# Patient Record
Sex: MALE | Race: BLACK OR AFRICAN AMERICAN | NOT HISPANIC OR LATINO | Employment: UNEMPLOYED | URBAN - METROPOLITAN AREA
[De-identification: names, ages, dates, MRNs, and addresses within clinical notes are randomized per-mention and may not be internally consistent; named-entity substitution may affect disease eponyms.]

---

## 2017-11-13 ENCOUNTER — HOSPITAL ENCOUNTER (INPATIENT)
Facility: HOSPITAL | Age: 32
LOS: 36 days | Discharge: NURSING, REHAB OR LONG-TERM CARE | DRG: 478 | End: 2017-12-19
Attending: EMERGENCY MEDICINE | Admitting: EMERGENCY MEDICINE
Payer: MEDICAID

## 2017-11-13 DIAGNOSIS — M46.46 DISCITIS OF LUMBAR REGION: Primary | ICD-10-CM

## 2017-11-13 DIAGNOSIS — M54.9 BACK PAIN: ICD-10-CM

## 2017-11-13 DIAGNOSIS — M54.41 ACUTE BILATERAL LOW BACK PAIN WITH BILATERAL SCIATICA: ICD-10-CM

## 2017-11-13 DIAGNOSIS — M54.42 ACUTE BILATERAL LOW BACK PAIN WITH BILATERAL SCIATICA: ICD-10-CM

## 2017-11-13 DIAGNOSIS — M86.9 OSTEOMYELITIS: ICD-10-CM

## 2017-11-13 LAB
AMPHET+METHAMPHET UR QL: NEGATIVE
ANION GAP SERPL CALC-SCNC: 9 MMOL/L
BACTERIA #/AREA URNS AUTO: ABNORMAL /HPF
BARBITURATES UR QL SCN>200 NG/ML: NEGATIVE
BASOPHILS # BLD AUTO: 0.02 K/UL
BASOPHILS NFR BLD: 0.4 %
BENZODIAZ UR QL SCN>200 NG/ML: NORMAL
BILIRUB UR QL STRIP: NEGATIVE
BUN SERPL-MCNC: 8 MG/DL
BZE UR QL SCN: NORMAL
CALCIUM SERPL-MCNC: 10.2 MG/DL
CANNABINOIDS UR QL SCN: NORMAL
CHLORIDE SERPL-SCNC: 101 MMOL/L
CLARITY UR REFRACT.AUTO: CLEAR
CO2 SERPL-SCNC: 30 MMOL/L
COLOR UR AUTO: YELLOW
CREAT SERPL-MCNC: 1 MG/DL
CREAT UR-MCNC: 284 MG/DL
CRP SERPL-MCNC: 9.7 MG/L
DIFFERENTIAL METHOD: ABNORMAL
EOSINOPHIL # BLD AUTO: 0.1 K/UL
EOSINOPHIL NFR BLD: 0.9 %
ERYTHROCYTE [DISTWIDTH] IN BLOOD BY AUTOMATED COUNT: 14.1 %
ERYTHROCYTE [SEDIMENTATION RATE] IN BLOOD BY WESTERGREN METHOD: 55 MM/HR
EST. GFR  (AFRICAN AMERICAN): >60 ML/MIN/1.73 M^2
EST. GFR  (NON AFRICAN AMERICAN): >60 ML/MIN/1.73 M^2
ESTIMATED AVG GLUCOSE: 117 MG/DL
ETHANOL UR-MCNC: <10 MG/DL
GLUCOSE SERPL-MCNC: 81 MG/DL
GLUCOSE UR QL STRIP: NEGATIVE
HBA1C MFR BLD HPLC: 5.7 %
HCT VFR BLD AUTO: 39.5 %
HGB BLD-MCNC: 12.7 G/DL
HGB UR QL STRIP: NEGATIVE
IMM GRANULOCYTES # BLD AUTO: 0.01 K/UL
IMM GRANULOCYTES NFR BLD AUTO: 0.2 %
KETONES UR QL STRIP: NEGATIVE
LEUKOCYTE ESTERASE UR QL STRIP: ABNORMAL
LYMPHOCYTES # BLD AUTO: 2.1 K/UL
LYMPHOCYTES NFR BLD: 39 %
MCH RBC QN AUTO: 27.2 PG
MCHC RBC AUTO-ENTMCNC: 32.2 G/DL
MCV RBC AUTO: 85 FL
METHADONE UR QL SCN>300 NG/ML: NEGATIVE
MICROSCOPIC COMMENT: ABNORMAL
MONOCYTES # BLD AUTO: 0.7 K/UL
MONOCYTES NFR BLD: 13.2 %
NEUTROPHILS # BLD AUTO: 2.5 K/UL
NEUTROPHILS NFR BLD: 46.3 %
NITRITE UR QL STRIP: NEGATIVE
NRBC BLD-RTO: 0 /100 WBC
OPIATES UR QL SCN: NORMAL
PCP UR QL SCN>25 NG/ML: NEGATIVE
PH UR STRIP: 5 [PH] (ref 5–8)
PLATELET # BLD AUTO: 368 K/UL
PMV BLD AUTO: 9.5 FL
POTASSIUM SERPL-SCNC: 4.2 MMOL/L
PROCALCITONIN SERPL IA-MCNC: 0.1 NG/ML
PROT UR QL STRIP: NEGATIVE
RBC # BLD AUTO: 4.67 M/UL
RBC #/AREA URNS AUTO: 1 /HPF (ref 0–4)
SODIUM SERPL-SCNC: 140 MMOL/L
SP GR UR STRIP: 1.02 (ref 1–1.03)
SQUAMOUS #/AREA URNS AUTO: 0 /HPF
TOXICOLOGY INFORMATION: NORMAL
URN SPEC COLLECT METH UR: ABNORMAL
UROBILINOGEN UR STRIP-ACNC: NEGATIVE EU/DL
WBC # BLD AUTO: 5.39 K/UL
WBC #/AREA URNS AUTO: 8 /HPF (ref 0–5)

## 2017-11-13 PROCEDURE — 85651 RBC SED RATE NONAUTOMATED: CPT

## 2017-11-13 PROCEDURE — 87040 BLOOD CULTURE FOR BACTERIA: CPT | Mod: 59

## 2017-11-13 PROCEDURE — 85025 COMPLETE CBC W/AUTO DIFF WBC: CPT

## 2017-11-13 PROCEDURE — 86140 C-REACTIVE PROTEIN: CPT

## 2017-11-13 PROCEDURE — 81001 URINALYSIS AUTO W/SCOPE: CPT

## 2017-11-13 PROCEDURE — 25000003 PHARM REV CODE 250: Performed by: STUDENT IN AN ORGANIZED HEALTH CARE EDUCATION/TRAINING PROGRAM

## 2017-11-13 PROCEDURE — 93005 ELECTROCARDIOGRAM TRACING: CPT

## 2017-11-13 PROCEDURE — 99285 EMERGENCY DEPT VISIT HI MDM: CPT | Mod: 25

## 2017-11-13 PROCEDURE — 99285 EMERGENCY DEPT VISIT HI MDM: CPT | Mod: ,,, | Performed by: NURSE PRACTITIONER

## 2017-11-13 PROCEDURE — 80048 BASIC METABOLIC PNL TOTAL CA: CPT

## 2017-11-13 PROCEDURE — 99284 EMERGENCY DEPT VISIT MOD MDM: CPT | Mod: ,,, | Performed by: PHYSICIAN ASSISTANT

## 2017-11-13 PROCEDURE — 20600001 HC STEP DOWN PRIVATE ROOM

## 2017-11-13 PROCEDURE — 80307 DRUG TEST PRSMV CHEM ANLYZR: CPT

## 2017-11-13 PROCEDURE — 83036 HEMOGLOBIN GLYCOSYLATED A1C: CPT

## 2017-11-13 PROCEDURE — 25000003 PHARM REV CODE 250: Performed by: NURSE PRACTITIONER

## 2017-11-13 PROCEDURE — 93010 ELECTROCARDIOGRAM REPORT: CPT | Mod: ,,, | Performed by: INTERNAL MEDICINE

## 2017-11-13 PROCEDURE — 84145 PROCALCITONIN (PCT): CPT

## 2017-11-13 PROCEDURE — 87086 URINE CULTURE/COLONY COUNT: CPT

## 2017-11-13 RX ORDER — AMOXICILLIN 250 MG
1 CAPSULE ORAL 2 TIMES DAILY
Status: DISCONTINUED | OUTPATIENT
Start: 2017-11-13 | End: 2017-12-19 | Stop reason: HOSPADM

## 2017-11-13 RX ORDER — IBUPROFEN 200 MG
1 TABLET ORAL DAILY
Status: DISCONTINUED | OUTPATIENT
Start: 2017-11-14 | End: 2017-12-01

## 2017-11-13 RX ORDER — NAPROXEN 500 MG/1
500 TABLET ORAL
Status: COMPLETED | OUTPATIENT
Start: 2017-11-13 | End: 2017-11-13

## 2017-11-13 RX ORDER — HYDROCODONE BITARTRATE AND ACETAMINOPHEN 7.5; 325 MG/1; MG/1
1 TABLET ORAL EVERY 6 HOURS PRN
COMMUNITY
End: 2018-01-31

## 2017-11-13 RX ORDER — OXYCODONE AND ACETAMINOPHEN 5; 325 MG/1; MG/1
1 TABLET ORAL
Status: COMPLETED | OUTPATIENT
Start: 2017-11-13 | End: 2017-11-13

## 2017-11-13 RX ORDER — OXYCODONE AND ACETAMINOPHEN 10; 325 MG/1; MG/1
1 TABLET ORAL
Status: COMPLETED | OUTPATIENT
Start: 2017-11-13 | End: 2017-11-13

## 2017-11-13 RX ORDER — CYCLOBENZAPRINE HCL 5 MG
5 TABLET ORAL 3 TIMES DAILY PRN
Status: DISCONTINUED | OUTPATIENT
Start: 2017-11-13 | End: 2017-11-21

## 2017-11-13 RX ORDER — MORPHINE SULFATE 2 MG/ML
4 INJECTION, SOLUTION INTRAMUSCULAR; INTRAVENOUS EVERY 6 HOURS PRN
Status: DISCONTINUED | OUTPATIENT
Start: 2017-11-13 | End: 2017-11-19

## 2017-11-13 RX ORDER — GABAPENTIN 300 MG/1
300 CAPSULE ORAL 3 TIMES DAILY
COMMUNITY
End: 2018-02-15 | Stop reason: SDUPTHER

## 2017-11-13 RX ORDER — HYDROCODONE BITARTRATE AND ACETAMINOPHEN 7.5; 325 MG/1; MG/1
1 TABLET ORAL EVERY 6 HOURS PRN
Status: DISCONTINUED | OUTPATIENT
Start: 2017-11-13 | End: 2017-11-20

## 2017-11-13 RX ORDER — SODIUM CHLORIDE 0.9 % (FLUSH) 0.9 %
5 SYRINGE (ML) INJECTION
Status: DISCONTINUED | OUTPATIENT
Start: 2017-11-13 | End: 2017-12-19 | Stop reason: HOSPADM

## 2017-11-13 RX ORDER — GABAPENTIN 300 MG/1
300 CAPSULE ORAL 3 TIMES DAILY
Status: DISCONTINUED | OUTPATIENT
Start: 2017-11-13 | End: 2017-11-17

## 2017-11-13 RX ORDER — GABAPENTIN 300 MG/1
300 CAPSULE ORAL 3 TIMES DAILY
Status: DISCONTINUED | OUTPATIENT
Start: 2017-11-13 | End: 2017-11-13

## 2017-11-13 RX ORDER — IBUPROFEN 200 MG
16 TABLET ORAL
Status: DISCONTINUED | OUTPATIENT
Start: 2017-11-13 | End: 2017-12-19 | Stop reason: HOSPADM

## 2017-11-13 RX ORDER — CYCLOBENZAPRINE HCL 5 MG
5 TABLET ORAL 3 TIMES DAILY PRN
Status: ON HOLD | COMMUNITY
End: 2017-12-19 | Stop reason: HOSPADM

## 2017-11-13 RX ORDER — HEPARIN SODIUM 5000 [USP'U]/ML
5000 INJECTION, SOLUTION INTRAVENOUS; SUBCUTANEOUS EVERY 8 HOURS
Status: DISCONTINUED | OUTPATIENT
Start: 2017-11-13 | End: 2017-11-14

## 2017-11-13 RX ORDER — IBUPROFEN 200 MG
24 TABLET ORAL
Status: DISCONTINUED | OUTPATIENT
Start: 2017-11-13 | End: 2017-12-19 | Stop reason: HOSPADM

## 2017-11-13 RX ORDER — GLUCAGON 1 MG
1 KIT INJECTION
Status: DISCONTINUED | OUTPATIENT
Start: 2017-11-13 | End: 2017-12-19 | Stop reason: HOSPADM

## 2017-11-13 RX ADMIN — OXYCODONE AND ACETAMINOPHEN 1 TABLET: 10; 325 TABLET ORAL at 10:11

## 2017-11-13 RX ADMIN — CYCLOBENZAPRINE HYDROCHLORIDE 5 MG: 5 TABLET, FILM COATED ORAL at 07:11

## 2017-11-13 RX ADMIN — OXYCODONE AND ACETAMINOPHEN 1 TABLET: 10; 325 TABLET ORAL at 02:11

## 2017-11-13 RX ADMIN — GABAPENTIN 300 MG: 300 CAPSULE ORAL at 07:11

## 2017-11-13 RX ADMIN — NAPROXEN 500 MG: 500 TABLET ORAL at 10:11

## 2017-11-13 RX ADMIN — OXYCODONE HYDROCHLORIDE AND ACETAMINOPHEN 1 TABLET: 5; 325 TABLET ORAL at 06:11

## 2017-11-13 NOTE — HPI
Evgeny Wilde is a 32 y.o. male who presents to [facility] for low back and leg pain. Patient has approximately 1.5 month history of left low back pain with LLE anterior leg pain, for which he underwent MRI of the T and L spine 3 weeks ago at an OSH. He presents today with new onset RLE anterior pain and numbness of the toes bilaterally for the past 2-3 days. He reports 2 episodes of nocturnal incontinence 6 weeks ago, with no episodes since that time. He presents to the ED because he has run out of his prescribed pain medication. Of note, he reports dark colored urine recently. Tachycardic on presentation.    Of note, MRI is not available for review. Radiology reports from OSH reports multiple thoracic schmorl's nodes and mild left L4-5 endplate changes resulting in mild left nf stenosis. No central compression reported.    History of IVDU in 2007.
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
## 2017-11-13 NOTE — ED NOTES
Pt ambulatory into intake room with walker, offered wheelchair, states unable to sit upright and prefers to ambulate with walker.

## 2017-11-13 NOTE — SUBJECTIVE & OBJECTIVE
(Not in a hospital admission)    Review of patient's allergies indicates:  No Known Allergies    Past Medical History:   Diagnosis Date    Lumbar herniated disc      History reviewed. No pertinent surgical history.  Family History     None        Social History Main Topics    Smoking status: Current Every Day Smoker     Packs/day: 0.50    Smokeless tobacco: Never Used    Alcohol use No    Drug use: No    Sexual activity: Not on file     Review of Systems   Constitutional: no fever, chills or night sweats. No changes in weight   Eyes: no visual changes   ENT: no nasal congestion or sore throat   Respiratory: no cough or shortness of breath   Cardiovascular: no chest pain or palpitations   Gastrointestinal: no nausea or vomiting   Genitourinary: no hematuria or dysuria   Integument/Breast: no rash or pruritis   Hematologic/Lymphatic: no easy bruising or lymphadenopathy   Musculoskeletal: no arthralgias or myalgias.   Neurological: no seizures or tremors   Behavioral/Psych: no auditory or visual hallucinations   Endocrine: no heat or cold intolerance   Objective:     Weight: 90.7 kg (200 lb)  Body mass index is 25.68 kg/m².  Vital Signs (Most Recent):  Temp: 98.9 °F (37.2 °C) (11/13/17 1236)  Pulse: 102 (11/13/17 1255)  Resp: 18 (11/13/17 1236)  BP: 121/71 (11/13/17 1236)  SpO2: 100 % (11/13/17 1236) Vital Signs (24h Range):  Temp:  [98.7 °F (37.1 °C)-98.9 °F (37.2 °C)] 98.9 °F (37.2 °C)  Pulse:  [102-118] 102  Resp:  [18] 18  SpO2:  [99 %-100 %] 100 %  BP: (110-121)/(71-83) 121/71                      Neurosurgery Physical Exam  General: well developed, well nourished, no distress.   Head: normocephalic, atraumatic  Neurologic: Alert and oriented. Thought content appropriate.  GCS: Motor: 6/Verbal: 5/Eyes: 4 GCS Total: 15  Mental Status: Awake, Alert, Oriented x 4  Language: No aphasia  Speech: No dysarthria  Cranial nerves: face symmetric, tongue midline, CN II-XII grossly intact.   Eyes: pupils equal, round,  reactive to light with accomodation, EOMI.  Pulmonary: normal respirations, no signs of respiratory distress  Abdomen: soft, non-distended, not tender to palpation  Sensory: Decreased sensation in L5 distributions bilaterally.    Motor Strength: Moves all extremities spontaneously with good tone.  Full strength upper and lower extremities, except pain limited L HF. No abnormal movements seen.     Strength  Deltoids Triceps Biceps Wrist Extension Wrist Flexion Hand    Upper: R 5/5 5/5 5/5 5/5 5/5 5/5    L 5/5 5/5 5/5 5/5 5/5 5/5     Iliopsoas Quadriceps Knee  Flexion Tibialis  anterior Gastro- cnemius EHL   Lower: R 5/5 5/5 5/5 5/5 5/5 5/5    L 4-/5 5/5 5/5 5/5 5/5 5/5     DTR's: 2 + and symmetric in UE and LE  Pronator Drift: no drift noted  Finger-to-nose: Intact bilaterally  Jones: absent  Clonus: absent  Babinski: absent  Pulses: 2+ and symmetric radial and dorsalis pedis.  Skin: Skin is warm, dry and intact.  Gait: antalgic, ambulates with walker leaning forward  Tandem Gait: No difficulty         Able to walk on heels & toes  Cervical ROM: full  Lumbar ROM: full   SI Joint tenderness: Negative   Pain on Hip ROM: Negative    TTP bilateral lumbar spine.    Significant Labs:  No results for input(s): GLU, NA, K, CL, CO2, BUN, CREATININE, CALCIUM, MG in the last 48 hours.  No results for input(s): WBC, HGB, HCT, PLT in the last 48 hours.  No results for input(s): LABPT, INR, APTT in the last 48 hours.  Microbiology Results (last 7 days)     ** No results found for the last 168 hours. **          Significant Diagnostics:  CT T and L spine reviewed.  Mild spondylosis throughout the thoracic spine without focal findings.  L4-5 endplate erosion with collapse of disc space concerning for osteodiscitis. There is also compression changes of L4 and L5. Mild central stenosis and moderate bilateral nf stenosis.

## 2017-11-13 NOTE — ED TRIAGE NOTES
"Pt arrived to ED with CC of numbness to bilateral feet onset "a couple days ago" - also c/o bilateral lower back pain, rates pain 10/10 at this time - reports reports diagnosed with herniated lumbar disc and left leg sciatica x1 month ago, states "sciatica pain" began in right leg sciatica x2-3 days ago. Pt reports out of pain medication - was taking taking flexiril, neurotin, and hydrocodone with good relief. Denies loss of bowel or bladder control.      "
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
## 2017-11-14 LAB
ANION GAP SERPL CALC-SCNC: 11 MMOL/L
BUN SERPL-MCNC: 11 MG/DL
CALCIUM SERPL-MCNC: 9.7 MG/DL
CHLORIDE SERPL-SCNC: 103 MMOL/L
CO2 SERPL-SCNC: 27 MMOL/L
CREAT SERPL-MCNC: 0.9 MG/DL
EST. GFR  (AFRICAN AMERICAN): >60 ML/MIN/1.73 M^2
EST. GFR  (NON AFRICAN AMERICAN): >60 ML/MIN/1.73 M^2
GLUCOSE SERPL-MCNC: 99 MG/DL
HIV 1+2 AB+HIV1 P24 AG SERPL QL IA: NEGATIVE
INR PPP: 0.9
MAGNESIUM SERPL-MCNC: 2.1 MG/DL
PHOSPHATE SERPL-MCNC: 3.7 MG/DL
POTASSIUM SERPL-SCNC: 4.4 MMOL/L
PROTHROMBIN TIME: 10.1 SEC
SODIUM SERPL-SCNC: 141 MMOL/L
TSH SERPL DL<=0.005 MIU/L-ACNC: 1.66 UIU/ML

## 2017-11-14 PROCEDURE — 97530 THERAPEUTIC ACTIVITIES: CPT

## 2017-11-14 PROCEDURE — 80048 BASIC METABOLIC PNL TOTAL CA: CPT

## 2017-11-14 PROCEDURE — 85610 PROTHROMBIN TIME: CPT

## 2017-11-14 PROCEDURE — 36415 COLL VENOUS BLD VENIPUNCTURE: CPT

## 2017-11-14 PROCEDURE — 84443 ASSAY THYROID STIM HORMONE: CPT

## 2017-11-14 PROCEDURE — 20600001 HC STEP DOWN PRIVATE ROOM

## 2017-11-14 PROCEDURE — 84100 ASSAY OF PHOSPHORUS: CPT

## 2017-11-14 PROCEDURE — 25000003 PHARM REV CODE 250: Performed by: STUDENT IN AN ORGANIZED HEALTH CARE EDUCATION/TRAINING PROGRAM

## 2017-11-14 PROCEDURE — 83735 ASSAY OF MAGNESIUM: CPT

## 2017-11-14 PROCEDURE — 99223 1ST HOSP IP/OBS HIGH 75: CPT | Mod: ,,, | Performed by: HOSPITALIST

## 2017-11-14 PROCEDURE — 63600175 PHARM REV CODE 636 W HCPCS: Performed by: STUDENT IN AN ORGANIZED HEALTH CARE EDUCATION/TRAINING PROGRAM

## 2017-11-14 PROCEDURE — 97166 OT EVAL MOD COMPLEX 45 MIN: CPT

## 2017-11-14 PROCEDURE — 86703 HIV-1/HIV-2 1 RESULT ANTBDY: CPT

## 2017-11-14 RX ORDER — ENOXAPARIN SODIUM 100 MG/ML
40 INJECTION SUBCUTANEOUS EVERY 24 HOURS
Status: DISCONTINUED | OUTPATIENT
Start: 2017-11-14 | End: 2017-11-15

## 2017-11-14 RX ADMIN — HEPARIN SODIUM 5000 UNITS: 5000 INJECTION, SOLUTION INTRAVENOUS; SUBCUTANEOUS at 05:11

## 2017-11-14 RX ADMIN — HYDROCODONE BITARTRATE AND ACETAMINOPHEN 1 TABLET: 7.5; 325 TABLET ORAL at 05:11

## 2017-11-14 RX ADMIN — STANDARDIZED SENNA CONCENTRATE AND DOCUSATE SODIUM 1 TABLET: 8.6; 5 TABLET, FILM COATED ORAL at 08:11

## 2017-11-14 RX ADMIN — GABAPENTIN 300 MG: 300 CAPSULE ORAL at 09:11

## 2017-11-14 RX ADMIN — GABAPENTIN 300 MG: 300 CAPSULE ORAL at 05:11

## 2017-11-14 RX ADMIN — CYCLOBENZAPRINE HYDROCHLORIDE 5 MG: 5 TABLET, FILM COATED ORAL at 08:11

## 2017-11-14 RX ADMIN — CYCLOBENZAPRINE HYDROCHLORIDE 5 MG: 5 TABLET, FILM COATED ORAL at 09:11

## 2017-11-14 RX ADMIN — ENOXAPARIN SODIUM 40 MG: 100 INJECTION SUBCUTANEOUS at 05:11

## 2017-11-14 RX ADMIN — GABAPENTIN 300 MG: 300 CAPSULE ORAL at 02:11

## 2017-11-14 RX ADMIN — HYDROCODONE BITARTRATE AND ACETAMINOPHEN 1 TABLET: 7.5; 325 TABLET ORAL at 11:11

## 2017-11-14 RX ADMIN — HYDROCODONE BITARTRATE AND ACETAMINOPHEN 1 TABLET: 7.5; 325 TABLET ORAL at 01:11
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
## 2017-11-14 NOTE — HPI
32 years old male with no significant past medical Hx, present to the ED with progressive lower back pain with sciatica for the last month. He works on constructions, pain start suddenly 1 month ago, at the lower back, dull, aggravated with movements. No inciting factors. Went to [facility] and [facility] ER, he had CT and MRI and they told him he had disk problem and he need neuro follow up and provided with muscle relaxant and narcotic for pain control. Patient stated the medicine is gone and the ain is worse with numbness in his left leg. He is not working for the last month. Denied any trauma, recent infection, surgery, foreign body, recent IV drug abuse. Also denied any fever, mekhi loss, fecal or urine incontinence, saddle anaesthesia.

## 2017-11-14 NOTE — ASSESSMENT & PLAN NOTE
-  CT lumbar spine concerning for spondylodiscitis at L4-L5.   - NSGY consulted, appreciate recs.   - ESR, CRP mildly elevated.  - Procal normal  - Blood cultures NGTD  - Urine cultures pending  - PT/OT   - HIV Abs negative  - Norco for pain relief.

## 2017-11-14 NOTE — PROGRESS NOTES
Patient Consulted by CTTS:     The following was discussed by the Tobacco Treatment Specialist:  ? Relevance of Quitting  ? Risk to Health  ? Long Term Risk  ? Risk for Others  ? Rewards of Quitting  ? Motivation Intervention to Quit          Pt not interested in the program. Pamphlet left at bedside.
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
## 2017-11-14 NOTE — PLAN OF CARE
Problem: Occupational Therapy Goal  Goal: Occupational Therapy Goal  Goals to be met by: 12/10/17    Patient will increase functional independence with ADLs by performing:    UE Dressing with [place].  LE Dressing with [place].  Grooming while standing at sink with [place].  Toileting from toilet with [place] for hygiene and clothing management.   Bathing from  sitting at sink with [place].    Outcome: Ongoing (interventions implemented as appropriate)  Evaluation complete and goals set.  Cont with POC  Palak Nobles OT  11/14/2017
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
## 2017-11-14 NOTE — SUBJECTIVE & OBJECTIVE
Interval History: NAEON. Vitals stable.    Review of Systems   Constitutional: Negative for appetite change, chills, fever and unexpected weight change.   Respiratory: Negative for cough and shortness of breath.    Cardiovascular: Negative for chest pain and palpitations.   Gastrointestinal: Negative for abdominal pain, diarrhea and vomiting.   Genitourinary: Negative for difficulty urinating.   Musculoskeletal: Positive for back pain and gait problem.   Neurological: Positive for weakness and numbness. Negative for dizziness and light-headedness.   Psychiatric/Behavioral: Negative for agitation and confusion.     Objective:     Vital Signs (Most Recent):  Temp: 97.6 °F (36.4 °C) (11/14/17 1308)  Pulse: 72 (11/14/17 1308)  Resp: 18 (11/14/17 1308)  BP: 131/78 (11/14/17 1308)  SpO2: 97 % (11/14/17 1308) Vital Signs (24h Range):  Temp:  [97.6 °F (36.4 °C)-97.8 °F (36.6 °C)] 97.6 °F (36.4 °C)  Pulse:  [] 72  Resp:  [16-18] 18  SpO2:  [97 %-100 %] 97 %  BP: (117-131)/(62-78) 131/78     Weight: 90.7 kg (200 lb)  Body mass index is 25.68 kg/m².    Intake/Output Summary (Last 24 hours) at 11/14/17 1529  Last data filed at 11/14/17 1346   Gross per 24 hour   Intake              240 ml   Output                0 ml   Net              240 ml      Physical Exam   Constitutional: He is oriented to person, place, and time.   Cardiovascular: Normal rate, regular rhythm, normal heart sounds and intact distal pulses.    Pulmonary/Chest: Effort normal and breath sounds normal.   Abdominal: Soft. Bowel sounds are normal.   Musculoskeletal: He exhibits tenderness.   Right lumbar paraspinal tenderness.   Neurological: He is alert and oriented to person, place, and time.   Right lower limb SLR+  Sensory loss over bilateral lower limb L5 distribution.   Nursing note and vitals reviewed.      Significant Labs:   A1C:   Recent Labs  Lab 11/13/17  1841   HGBA1C 5.7*     ABGs: No results for input(s): PH, PCO2, HCO3, POCSATURATED, BE,  TOTALHB, COHB, METHB, O2HB, POCFIO2 in the last 48 hours.  Bilirubin: No results for input(s): BILIDIR, BILIRUBINTOT, BILITOT in the last 720 hours.  Blood Culture:   Recent Labs  Lab 11/13/17 1842   LABBLOO No Growth to date  No Growth to date     BMP:   Recent Labs  Lab 11/14/17 0412   GLU 99      K 4.4      CO2 27   BUN 11   CREATININE 0.9   CALCIUM 9.7   MG 2.1     CBC:   Recent Labs  Lab 11/13/17 1841   WBC 5.39   HGB 12.7*   HCT 39.5*   *     CMP:   Recent Labs  Lab 11/13/17 1841 11/14/17 0412    141   K 4.2 4.4    103   CO2 30* 27   GLU 81 99   BUN 8 11   CREATININE 1.0 0.9   CALCIUM 10.2 9.7   ANIONGAP 9 11   EGFRNONAA >60.0 >60.0     Cardiac Markers: No results for input(s): CKMB, MYOGLOBIN, BNP, TROPISTAT in the last 48 hours.  Coagulation:   Recent Labs  Lab 11/14/17 0412   INR 0.9     Lactic Acid: No results for input(s): LACTATE in the last 48 hours.  Lipase: No results for input(s): LIPASE in the last 48 hours.  Lipid Panel: No results for input(s): CHOL, HDL, LDLCALC, TRIG, CHOLHDL in the last 48 hours.  Magnesium:   Recent Labs  Lab 11/14/17 0412   MG 2.1     Pathology Results  (Last 10 years)    None        POCT Glucose: No results for input(s): POCTGLUCOSE in the last 48 hours.  Prealbumin: No results for input(s): PREALBUMIN in the last 48 hours.  Respiratory Culture: No results for input(s): GSRESP, RESPIRATORYC in the last 48 hours.  Troponin: No results for input(s): TROPONINI in the last 48 hours.  TSH:   Recent Labs  Lab 11/14/17 0412   TSH 1.664     Urine Culture: No results for input(s): LABURIN in the last 48 hours.  Urine Studies:   Recent Labs  Lab 11/13/17 1921   COLORU Yellow   APPEARANCEUA Clear   PHUR 5.0   SPECGRAV 1.025   PROTEINUA Negative   GLUCUA Negative   KETONESU Negative   BILIRUBINUA Negative   OCCULTUA Negative   NITRITE Negative   UROBILINOGEN Negative   LEUKOCYTESUR Trace*   RBCUA 1   WBCUA 8*   BACTERIA Rare   SQUAMEPITHEL 0      All pertinent labs within the past 24 hours have been reviewed.    Significant Imaging: I have reviewed all pertinent imaging results/findings within the past 24 hours.

## 2017-11-14 NOTE — PROGRESS NOTES
[facility] Hospital Medicine  Progress Note    Patient Name: Evgeny Wilde  MRN: [id number]  Patient Class: IP- Inpatient   Admission Date: 11/13/2017  Length of Stay: 1 days  Attending Physician: Feliciano Robles MD  Primary Care Provider: Primary Doctor [facility] Hospital Medicine Team: [facility] HOSP MED 2 Rad Fall MD    Subjective:     Principal Problem:Acute bilateral low back pain with bilateral sciatica    HPI:  32 years old male with no significant past medical Hx, present to the ED with progressive lower back pain with sciatica for the last month. He works on constructions, pain start suddenly 1 month ago, at the lower back, dull, aggravated with movements. No inciting factors. Went to [facility] and [facility] ER, he had CT and MRI and they told him he had disk problem and he need neuro follow up and provided with muscle relaxant and narcotic for pain control. Patient stated the medicine is gone and the ain is worse with numbness in his left leg. He is not working for the last month. Denied any trauma, recent infection, surgery, foreign body, recent IV drug abuse. Also denied any fever, mekhi loss, fecal or urine incontinence, saddle anaesthesia.          Hospital Course:  11/14: NSGY following             Blood cultures NGTD, Urine cultures pending.              CT lumbar spine concerning for spondylodiscitis.    Interval History: NAEON. Vitals stable.    Review of Systems   Constitutional: Negative for appetite change, chills, fever and unexpected weight change.   Respiratory: Negative for cough and shortness of breath.    Cardiovascular: Negative for chest pain and palpitations.   Gastrointestinal: Negative for abdominal pain, diarrhea and vomiting.   Genitourinary: Negative for difficulty urinating.   Musculoskeletal: Positive for back pain and gait problem.   Neurological: Positive for weakness and numbness. Negative for dizziness and light-headedness.   Psychiatric/Behavioral: Negative  for agitation and confusion.     Objective:     Vital Signs (Most Recent):  Temp: 97.6 °F (36.4 °C) (11/14/17 1308)  Pulse: 72 (11/14/17 1308)  Resp: 18 (11/14/17 1308)  BP: 131/78 (11/14/17 1308)  SpO2: 97 % (11/14/17 1308) Vital Signs (24h Range):  Temp:  [97.6 °F (36.4 °C)-97.8 °F (36.6 °C)] 97.6 °F (36.4 °C)  Pulse:  [] 72  Resp:  [16-18] 18  SpO2:  [97 %-100 %] 97 %  BP: (117-131)/(62-78) 131/78     Weight: 90.7 kg (200 lb)  Body mass index is 25.68 kg/m².    Intake/Output Summary (Last 24 hours) at 11/14/17 1529  Last data filed at 11/14/17 1346   Gross per 24 hour   Intake              240 ml   Output                0 ml   Net              240 ml      Physical Exam   Constitutional: He is oriented to person, place, and time.   Cardiovascular: Normal rate, regular rhythm, normal heart sounds and intact distal pulses.    Pulmonary/Chest: Effort normal and breath sounds normal.   Abdominal: Soft. Bowel sounds are normal.   Musculoskeletal: He exhibits tenderness.   Right lumbar paraspinal tenderness.   Neurological: He is alert and oriented to person, place, and time.   Right lower limb SLR+  Sensory loss over bilateral lower limb L5 distribution.   Nursing note and vitals reviewed.      Significant Labs:   A1C:   Recent Labs  Lab 11/13/17 1841   HGBA1C 5.7*     ABGs: No results for input(s): PH, PCO2, HCO3, POCSATURATED, BE, TOTALHB, COHB, METHB, O2HB, POCFIO2 in the last 48 hours.  Bilirubin: No results for input(s): BILIDIR, BILIRUBINTOT, BILITOT in the last 720 hours.  Blood Culture:   Recent Labs  Lab 11/13/17 1842   LABBLOO No Growth to date  No Growth to date     BMP:   Recent Labs  Lab 11/14/17  0412   GLU 99      K 4.4      CO2 27   BUN 11   CREATININE 0.9   CALCIUM 9.7   MG 2.1     CBC:   Recent Labs  Lab 11/13/17 1841   WBC 5.39   HGB 12.7*   HCT 39.5*   *     CMP:   Recent Labs  Lab 11/13/17 1841 11/14/17  0412    141   K 4.2 4.4    103   CO2 30* 27   GLU 81  99   BUN 8 11   CREATININE 1.0 0.9   CALCIUM 10.2 9.7   ANIONGAP 9 11   EGFRNONAA >60.0 >60.0     Cardiac Markers: No results for input(s): CKMB, MYOGLOBIN, BNP, TROPISTAT in the last 48 hours.  Coagulation:   Recent Labs  Lab 11/14/17 0412   INR 0.9     Lactic Acid: No results for input(s): LACTATE in the last 48 hours.  Lipase: No results for input(s): LIPASE in the last 48 hours.  Lipid Panel: No results for input(s): CHOL, HDL, LDLCALC, TRIG, CHOLHDL in the last 48 hours.  Magnesium:   Recent Labs  Lab 11/14/17 0412   MG 2.1     Pathology Results  (Last 10 years)    None        POCT Glucose: No results for input(s): POCTGLUCOSE in the last 48 hours.  Prealbumin: No results for input(s): PREALBUMIN in the last 48 hours.  Respiratory Culture: No results for input(s): GSRESP, RESPIRATORYC in the last 48 hours.  Troponin: No results for input(s): TROPONINI in the last 48 hours.  TSH:   Recent Labs  Lab 11/14/17 0412   TSH 1.664     Urine Culture: No results for input(s): LABURIN in the last 48 hours.  Urine Studies:   Recent Labs  Lab 11/13/17 1921   COLORU Yellow   APPEARANCEUA Clear   PHUR 5.0   SPECGRAV 1.025   PROTEINUA Negative   GLUCUA Negative   KETONESU Negative   BILIRUBINUA Negative   OCCULTUA Negative   NITRITE Negative   UROBILINOGEN Negative   LEUKOCYTESUR Trace*   RBCUA 1   WBCUA 8*   BACTERIA Rare   SQUAMEPITHEL 0     All pertinent labs within the past 24 hours have been reviewed.    Significant Imaging: I have reviewed all pertinent imaging results/findings within the past 24 hours.    Assessment/Plan:      * Acute bilateral low back pain with bilateral sciatica    -  CT lumbar spine concerning for spondylodiscitis at L4-L5.   - NSGY consulted, appreciate recs.   - ESR, CRP mildly elevated.  - Procal normal  - Blood cultures NGTD  - Urine cultures pending  - PT/OT   - HIV Abs negative  - Norco for pain relief.            Discitis of lumbar region    - NSGY consult, appreciate recs.          VTE  Risk Mitigation         Ordered     enoxaparin injection 40 mg  Daily     Route:  Subcutaneous        11/14/17 0738     Medium Risk of VTE  Once      11/13/17 1910              Rad Fall MD  Department of Hospital Medicine   [facility]

## 2017-11-14 NOTE — SUBJECTIVE & OBJECTIVE
Interval History: NAEON. Infectious workup ongoing. Afebrile overnight. Continued back and leg pain.    Medications:  Continuous Infusions:   Scheduled Meds:   enoxaparin  40 mg Subcutaneous Daily    gabapentin  300 mg Oral TID    nicotine  1 patch Transdermal Daily    senna-docusate 8.6-50 mg  1 tablet Oral BID     PRN Meds:cyclobenzaprine, dextrose 50%, dextrose 50%, glucagon (human recombinant), glucose, glucose, hydrocodone-acetaminophen 7.5-325mg, morphine, sodium chloride 0.9%     Review of Systems  Objective:     Weight: 90.7 kg (200 lb)  Body mass index is 25.68 kg/m².  Vital Signs (Most Recent):  Temp: 97.7 °F (36.5 °C) (11/14/17 0733)  Pulse: 83 (11/14/17 0733)  Resp: 16 (11/14/17 0733)  BP: 125/62 (11/14/17 0733)  SpO2: 99 % (11/14/17 0733) Vital Signs (24h Range):  Temp:  [97.6 °F (36.4 °C)-97.8 °F (36.6 °C)] 97.7 °F (36.5 °C)  Pulse:  [] 83  Resp:  [16-18] 16  SpO2:  [98 %-100 %] 99 %  BP: (117-125)/(62-74) 125/62                           Neurosurgery Physical Exam  General: well developed, well nourished, no distress.   Head: normocephalic, atraumatic  Neurologic: Alert and oriented. Thought content appropriate.  GCS: Motor: 6/Verbal: 5/Eyes: 4 GCS Total: 15  Mental Status: Awake, Alert, Oriented x 4  Language: No aphasia  Speech: No dysarthria  Cranial nerves: face symmetric, tongue midline, CN II-XII grossly intact.   Eyes: pupils equal, round, reactive to light with accomodation, EOMI.  Pulmonary: normal respirations, no signs of respiratory distress  Abdomen: soft, non-distended, not tender to palpation  Sensory: Decreased sensation in L5 distributions bilaterally.     Motor Strength: Moves all extremities spontaneously with good tone.  Full strength upper and lower extremities, except pain limited L HF. No abnormal movements seen.      Strength   Deltoids Triceps Biceps Wrist Extension Wrist Flexion Hand    Upper: R 5/5 5/5 5/5 5/5 5/5 5/5     L 5/5 5/5 5/5 5/5 5/5 5/5        Iliopsoas Quadriceps Knee  Flexion Tibialis  anterior Gastro- cnemius EHL   Lower: R 5/5 5/5 5/5 5/5 5/5 5/5     L 4-/5 5/5 5/5 5/5 5/5 5/5      DTR's: 2 + and symmetric in UE and LE  Pronator Drift: no drift noted  Finger-to-nose: Intact bilaterally  Jones: absent  Clonus: absent  Babinski: absent  Pulses: 2+ and symmetric radial and dorsalis pedis.  Skin: Skin is warm, dry and intact.    Significant Labs:    Recent Labs  Lab 11/13/17 1841 11/14/17 0412   GLU 81 99    141   K 4.2 4.4    103   CO2 30* 27   BUN 8 11   CREATININE 1.0 0.9   CALCIUM 10.2 9.7   MG  --  2.1       Recent Labs  Lab 11/13/17 1841   WBC 5.39   HGB 12.7*   HCT 39.5*   *       Recent Labs  Lab 11/14/17 0412   INR 0.9     Microbiology Results (last 7 days)     Procedure Component Value Units Date/Time    Blood culture [[id number]] Collected:  11/13/17 1842    Order Status:  Completed Specimen:  Blood from Peripheral, Antecubital, Right Updated:  11/14/17 0315     Blood Culture, Routine No Growth to date    Blood culture [[id number]] Collected:  11/13/17 1842    Order Status:  Completed Specimen:  Blood from Peripheral, Antecubital, Left Updated:  11/14/17 0315     Blood Culture, Routine No Growth to date    Urine culture [[id number]] Collected:  11/13/17 1921    Order Status:  Sent Specimen:  Urine from Urine, Clean Catch Updated:  11/13/17 1936        CRP:   Recent Labs  Lab 11/13/17 1841   CRP 9.7*     ESR 55    Procalcitonin:   Recent Labs  Lab 11/13/17 1842   PROCAL 0.10       Significant Diagnostics:  Flex ex films reviewed 11/14/2017 and show aforementioned CT findings with no evidence of translational instability.
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
## 2017-11-14 NOTE — ASSESSMENT & PLAN NOTE
-- with concerning signs on CT for  spondylodiscitis at L4-L5.   -- patient VS stable.   -- blood Cxs   -- hold Abx for now   -- ESR,CRP   -- NSGY consult for disk Bx   -- PT/OT   -- HIV Ab  -- records milton from OSH including recent MRI
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
## 2017-11-14 NOTE — HOSPITAL COURSE
Evgeny Wilde was admitted to Internal Medicine team 2.  Neurosurgery was consulted after CT scan obtained concerning for spondylodiscitis.  Blood and urine cultures were negative.  Neurosurgery felt that there was no indication for surgical intervention during this hospitalization.  On 11/17/2017 he underwent a IR biopsy of th L4-L5.  Pathology resulted as acute on chronic osteomyelitis. Infectious diseases was consulted and recommended 6-8 week of IV antibiotics.  He was started on IV vancomycin and rocephin.  Given his history IV drug use he was not a candidate for PICC line and home infusions.  During this hospitalization he complained of back spasms that was treated with lidocaine patches, anti-spasmodic medications, and Norco.  Patient would ambulate off the floor to smoke cigarettes.  He was counseled on smoking cessation and offered a nicotine patch.  On hospital day 36 (12/19/2017) he was accepted to an LTAC facility to have the remainder of his antibiotic therapy.  Referrals made to follow up with NSGY and ID upon completion of antibiotics.    ROS:  Constitutional: Negative for chills, fever and unexpected weight change.   HENT: Negative for hearing loss.    Eyes: Negative for visual disturbance.   Respiratory: Negative for shortness of breath.    Cardiovascular: Negative for chest pain.   Gastrointestinal: Negative for abdominal pain, diarrhea, nausea and vomiting.   Genitourinary: Negative for dysuria.   Musculoskeletal: Positive for back pain. Negative for arthralgias.        Back spasms that are improving   Skin: Negative for rash.   Neurological: Negative for seizures and numbness.     Physical Exam:  Constitutional: He is oriented to person, place, and time. He appears well-developed and well-nourished.   HENT:   Head: Normocephalic and atraumatic.   Eyes: EOM are normal. Pupils are equal, round, and reactive to light.   Neck: No tracheal deviation present. No thyromegaly present.   Cardiovascular:  Normal rate.  Exam reveals no gallop and no friction rub.    No murmur heard.  Pulmonary/Chest: Effort normal and breath sounds normal. He has no wheezes. He has no rales.   Abdominal: There is no tenderness. No hernia.   Musculoskeletal: He exhibits point tenderness (lumbar spine ). He exhibits no edema.   Neurological: He is alert and oriented to person, place, and time. No cranial nerve deficit or sensory deficit. He exhibits normal muscle tone.   Skin: Skin is warm. No erythema.   Multiple tattoos   Psychiatric: He has a normal mood and affect.

## 2017-11-14 NOTE — PLAN OF CARE
[employer] [id number] [place] [address] - [address] AT [facility] [place]  [address]  [facility] [address]  Phone: [phone] Fax: [phone]      Payor: MEDICAID / Plan: [place]UE [place] / Product Type: Managed Medicaid /         11/14/17 1400   Discharge Assessment   Assessment Type Discharge Planning Assessment   Confirmed/corrected address and phone number on facesheet? Yes   Assessment information obtained from? Patient   Expected Length of Stay (days) 3   Communicated expected length of stay with patient/caregiver yes   Prior to hospitilization cognitive status: Alert/Oriented   Prior to hospitalization functional status: Assistive Equipment   Current cognitive status: Alert/Oriented   Current Functional Status: Assistive Equipment   Lives With parent(s)  (Pt states he lives with his mother.)   Able to Return to Prior Arrangements yes   Is patient able to care for self after discharge? Unable to determine at this time (comments)   Patient's perception of discharge disposition home or selfcare   Readmission Within The Last 30 Days no previous admission in last 30 days   Patient currently being followed by outpatient case management? No   Patient currently receives any other outside agency services? No   Equipment Currently Used at Home none   Do you have any problems affording any of your prescribed medications? No   Is the patient taking medications as prescribed? yes   Does the patient have transportation home? Yes   Transportation Available family or friend will provide   Does the patient receive services at the Coumadin Clinic? No   Discharge Plan A Home with family   Patient/Family In Agreement With Plan yes

## 2017-11-14 NOTE — PLAN OF CARE
Problem: Patient Care Overview  Goal: Plan of Care Review  PT AAOx4, vs stable, iv access flushes well, c/o of lower back and bilateral extremity pain and spasms,flexaril given, diet advanced to regular adult, ambulates through lau with walker independently, no skin issues, will continue to monitor    Problem: Fall Risk (Adult)  Intervention: Safety Promotion/Fall Prevention  Pt verbalizes understanding to call prior to ambulating,walker at bedside

## 2017-11-14 NOTE — PHARMACY MED REC
Admission Medication Reconciliation - Pharmacy Consult Note    The home medication history was taken by Lida Phoenix, Pharmacy Technician    No issues noted with the medication reconciliation.    Potential issues to be addressed PRIOR TO DISCHARGE  o Patient requests bedside delivery of any new meds    Avel Tucker, PharmD  [id number]      Patient's prior to admission medication regimen was as follows:  Prescriptions Prior to Admission   Medication Sig Dispense Refill Last Dose    cyclobenzaprine (FLEXERIL) 5 MG tablet Take 5 mg by mouth 3 (three) times daily as needed for Muscle spasms.   Past Month    gabapentin (NEURONTIN) 300 MG capsule Take 300 mg by mouth 3 (three) times daily.   Past Month    hydrocodone-acetaminophen 7.5-325mg (NORCO) 7.5-325 mg per tablet Take 1 tablet by mouth every 6 (six) hours as needed for Pain.   Past Month         Please add appropriate    SmartPhrase below:
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
## 2017-11-15 LAB
ANION GAP SERPL CALC-SCNC: 8 MMOL/L
BACTERIA UR CULT: NORMAL
BACTERIA UR CULT: NORMAL
BASOPHILS # BLD AUTO: 0.03 K/UL
BASOPHILS NFR BLD: 0.6 %
BUN SERPL-MCNC: 10 MG/DL
CALCIUM SERPL-MCNC: 9.4 MG/DL
CHLORIDE SERPL-SCNC: 105 MMOL/L
CO2 SERPL-SCNC: 28 MMOL/L
CREAT SERPL-MCNC: 0.9 MG/DL
DIFFERENTIAL METHOD: ABNORMAL
EOSINOPHIL # BLD AUTO: 0.1 K/UL
EOSINOPHIL NFR BLD: 1.2 %
ERYTHROCYTE [DISTWIDTH] IN BLOOD BY AUTOMATED COUNT: 14 %
EST. GFR  (AFRICAN AMERICAN): >60 ML/MIN/1.73 M^2
EST. GFR  (NON AFRICAN AMERICAN): >60 ML/MIN/1.73 M^2
GLUCOSE SERPL-MCNC: 106 MG/DL
HCT VFR BLD AUTO: 37.6 %
HGB BLD-MCNC: 11.7 G/DL
IMM GRANULOCYTES # BLD AUTO: 0.01 K/UL
IMM GRANULOCYTES NFR BLD AUTO: 0.2 %
LYMPHOCYTES # BLD AUTO: 2.6 K/UL
LYMPHOCYTES NFR BLD: 51.3 %
MAGNESIUM SERPL-MCNC: 1.8 MG/DL
MCH RBC QN AUTO: 26.4 PG
MCHC RBC AUTO-ENTMCNC: 31.1 G/DL
MCV RBC AUTO: 85 FL
MONOCYTES # BLD AUTO: 0.8 K/UL
MONOCYTES NFR BLD: 16 %
NEUTROPHILS # BLD AUTO: 1.6 K/UL
NEUTROPHILS NFR BLD: 30.7 %
NRBC BLD-RTO: 0 /100 WBC
PHOSPHATE SERPL-MCNC: 4.1 MG/DL
PLATELET # BLD AUTO: 344 K/UL
PMV BLD AUTO: 9.2 FL
POTASSIUM SERPL-SCNC: 4.1 MMOL/L
RBC # BLD AUTO: 4.44 M/UL
SODIUM SERPL-SCNC: 141 MMOL/L
WBC # BLD AUTO: 5.13 K/UL

## 2017-11-15 PROCEDURE — 80048 BASIC METABOLIC PNL TOTAL CA: CPT

## 2017-11-15 PROCEDURE — 99233 SBSQ HOSP IP/OBS HIGH 50: CPT | Mod: ,,, | Performed by: HOSPITALIST

## 2017-11-15 PROCEDURE — 84100 ASSAY OF PHOSPHORUS: CPT

## 2017-11-15 PROCEDURE — 25000003 PHARM REV CODE 250: Performed by: STUDENT IN AN ORGANIZED HEALTH CARE EDUCATION/TRAINING PROGRAM

## 2017-11-15 PROCEDURE — 36415 COLL VENOUS BLD VENIPUNCTURE: CPT

## 2017-11-15 PROCEDURE — 97161 PT EVAL LOW COMPLEX 20 MIN: CPT

## 2017-11-15 PROCEDURE — 83735 ASSAY OF MAGNESIUM: CPT

## 2017-11-15 PROCEDURE — 20600001 HC STEP DOWN PRIVATE ROOM

## 2017-11-15 PROCEDURE — 99232 SBSQ HOSP IP/OBS MODERATE 35: CPT | Mod: ,,, | Performed by: PHYSICIAN ASSISTANT

## 2017-11-15 PROCEDURE — 85025 COMPLETE CBC W/AUTO DIFF WBC: CPT

## 2017-11-15 RX ADMIN — HYDROCODONE BITARTRATE AND ACETAMINOPHEN 1 TABLET: 7.5; 325 TABLET ORAL at 02:11

## 2017-11-15 RX ADMIN — HYDROCODONE BITARTRATE AND ACETAMINOPHEN 1 TABLET: 7.5; 325 TABLET ORAL at 09:11

## 2017-11-15 RX ADMIN — CYCLOBENZAPRINE HYDROCHLORIDE 5 MG: 5 TABLET, FILM COATED ORAL at 06:11

## 2017-11-15 RX ADMIN — HYDROCODONE BITARTRATE AND ACETAMINOPHEN 1 TABLET: 7.5; 325 TABLET ORAL at 04:11

## 2017-11-15 RX ADMIN — GABAPENTIN 300 MG: 300 CAPSULE ORAL at 05:11

## 2017-11-15 RX ADMIN — STANDARDIZED SENNA CONCENTRATE AND DOCUSATE SODIUM 1 TABLET: 8.6; 5 TABLET, FILM COATED ORAL at 09:11

## 2017-11-15 RX ADMIN — GABAPENTIN 300 MG: 300 CAPSULE ORAL at 02:11

## 2017-11-15 RX ADMIN — CYCLOBENZAPRINE HYDROCHLORIDE 5 MG: 5 TABLET, FILM COATED ORAL at 02:11

## 2017-11-15 RX ADMIN — CYCLOBENZAPRINE HYDROCHLORIDE 5 MG: 5 TABLET, FILM COATED ORAL at 10:11

## 2017-11-15 RX ADMIN — GABAPENTIN 300 MG: 300 CAPSULE ORAL at 09:11

## 2017-11-15 NOTE — PROGRESS NOTES
[facility] Hospital Medicine  Progress Note    Patient Name: Evgeny Wilde  MRN: [id number]  Patient Class: IP- Inpatient   Admission Date: 11/13/2017  Length of Stay: 2 days  Attending Physician: Davian Payne, *  Primary Care Provider: [facility] Medicine Team: [facility] HOSP MED 2 MIRELLA Davis    Subjective:     Principal Problem:Acute bilateral low back pain with bilateral sciatica    HPI:  32 years old male with no significant past medical Hx, present to the ED with progressive lower back pain with sciatica for the last month. He works on constructions, pain start suddenly 1 month ago, at the lower back, dull, aggravated with movements. No inciting factors. Went to [facility] ER, he had CT and MRI and they told him he had disk problem and he need neuro follow up and provided with muscle relaxant and narcotic for pain control. Patient stated the medicine is gone and the ain is worse with numbness in his left leg. He is not working for the last month. Denied any trauma, recent infection, surgery, foreign body, recent IV drug abuse. Also denied any fever, mekhi loss, fecal or urine incontinence, saddle anaesthesia.          Hospital Course:  11/14: NSGY following             Blood cultures NGTD, Urine cultures pending.              CT lumbar spine concerning for spondylodiscitis.  11/15: afebrile overnight, blood CX NGTD, NSGY stated no indications for NSGY interventions this admission, they want follow him up as outpatient after after Abx. We will pursue IR for disk Bx/aspirate for Dx.           Review of Systems   Constitutional: Negative for appetite change, chills, fever and unexpected weight change.   Respiratory: Negative for cough and shortness of breath.    Cardiovascular: Negative for chest pain and palpitations.   Gastrointestinal: Negative for abdominal pain, diarrhea and vomiting.   Genitourinary: Negative for difficulty urinating.    Musculoskeletal: Positive for back pain and gait problem.   Neurological: Positive for weakness and numbness. Negative for dizziness and light-headedness.   Psychiatric/Behavioral: Negative for agitation and confusion.     Objective:     Vital Signs (Most Recent):  Temp: 97.6 °F (36.4 °C) (11/15/17 1208)  Pulse: 85 (11/15/17 1208)  Resp: 18 (11/15/17 1208)  BP: 113/66 (11/15/17 1208)  SpO2: 99 % (11/15/17 1208) Vital Signs (24h Range):  Temp:  [97.6 °F (36.4 °C)-98.9 °F (37.2 °C)] 97.6 °F (36.4 °C)  Pulse:  [] 85  Resp:  [16-18] 18  SpO2:  [97 %-100 %] 99 %  BP: (113-131)/(58-78) 113/66     Weight: 90.2 kg (198 lb 12.9 oz)  Body mass index is 25.53 kg/m².    Intake/Output Summary (Last 24 hours) at 11/15/17 1302  Last data filed at 11/15/17 1000   Gross per 24 hour   Intake              580 ml   Output                0 ml   Net              580 ml      Physical Exam   Constitutional: He is oriented to person, place, and time.   Cardiovascular: Normal rate, regular rhythm, normal heart sounds and intact distal pulses.    Pulmonary/Chest: Effort normal and breath sounds normal.   Abdominal: Soft. Bowel sounds are normal.   Musculoskeletal: He exhibits tenderness.   Right lumbar paraspinal tenderness.   Neurological: He is alert and oriented to person, place, and time.   Right lower limb SLR+  Sensory loss over bilateral lower limb L5 distribution.   Nursing note and vitals reviewed.      Significant Labs: All pertinent labs within the past 24 hours have been reviewed.        Assessment/Plan:      * Acute bilateral low back pain with bilateral sciatica    -  CT lumbar spine concerning for spondylodiscitis at L4-L5.   - NSGY consulted, appreciate recs.   - ESR, CRP mildly elevated.  - Procal normal  - Blood cultures NGTD   - Urine cultures NGTD  - PT/OT   - HIV Abs negative  - Norco for pain relief.  - IR consulted for aspirate/Bx, on him I will check TB beside bacterial Cx.   - keep him off Abx             Discitis of lumbar region    - NSGY consult, appreciate recs.          VTE Risk Mitigation         Ordered     Medium Risk of VTE  Once      11/13/17 1910              MIRELLA Davis  Department of Hospital Medicine   [facility]

## 2017-11-15 NOTE — SUBJECTIVE & OBJECTIVE
Review of Systems   Constitutional: Negative for appetite change, chills, fever and unexpected weight change.   Respiratory: Negative for cough and shortness of breath.    Cardiovascular: Negative for chest pain and palpitations.   Gastrointestinal: Negative for abdominal pain, diarrhea and vomiting.   Genitourinary: Negative for difficulty urinating.   Musculoskeletal: Positive for back pain and gait problem.   Neurological: Positive for weakness and numbness. Negative for dizziness and light-headedness.   Psychiatric/Behavioral: Negative for agitation and confusion.     Objective:     Vital Signs (Most Recent):  Temp: 97.6 °F (36.4 °C) (11/15/17 1208)  Pulse: 85 (11/15/17 1208)  Resp: 18 (11/15/17 1208)  BP: 113/66 (11/15/17 1208)  SpO2: 99 % (11/15/17 1208) Vital Signs (24h Range):  Temp:  [97.6 °F (36.4 °C)-98.9 °F (37.2 °C)] 97.6 °F (36.4 °C)  Pulse:  [] 85  Resp:  [16-18] 18  SpO2:  [97 %-100 %] 99 %  BP: (113-131)/(58-78) 113/66     Weight: 90.2 kg (198 lb 12.9 oz)  Body mass index is 25.53 kg/m².    Intake/Output Summary (Last 24 hours) at 11/15/17 1302  Last data filed at 11/15/17 1000   Gross per 24 hour   Intake              580 ml   Output                0 ml   Net              580 ml      Physical Exam   Constitutional: He is oriented to person, place, and time.   Cardiovascular: Normal rate, regular rhythm, normal heart sounds and intact distal pulses.    Pulmonary/Chest: Effort normal and breath sounds normal.   Abdominal: Soft. Bowel sounds are normal.   Musculoskeletal: He exhibits tenderness.   Right lumbar paraspinal tenderness.   Neurological: He is alert and oriented to person, place, and time.   Right lower limb SLR+  Sensory loss over bilateral lower limb L5 distribution.   Nursing note and vitals reviewed.      Significant Labs: All pertinent labs within the past 24 hours have been reviewed.

## 2017-11-15 NOTE — SUBJECTIVE & OBJECTIVE
Interval History: Continues to have back pain & leg pain.  No correlating neuro deficits.  No paresthesias or B/B dysfunction.  States last IVDU 2 months ago ( cocaine).  No fevers.    Medications:  Continuous Infusions:   Scheduled Meds:   gabapentin  300 mg Oral TID    nicotine  1 patch Transdermal Daily    senna-docusate 8.6-50 mg  1 tablet Oral BID     PRN Meds:cyclobenzaprine, dextrose 50%, dextrose 50%, glucagon (human recombinant), glucose, glucose, hydrocodone-acetaminophen 7.5-325mg, morphine, sodium chloride 0.9%     Review of Systems  Objective:     Weight: 90.2 kg (198 lb 12.9 oz)  Body mass index is 25.53 kg/m².  Vital Signs (Most Recent):  Temp: 97.6 °F (36.4 °C) (11/15/17 1208)  Pulse: 85 (11/15/17 1208)  Resp: 18 (11/15/17 1208)  BP: 113/66 (11/15/17 1208)  SpO2: 99 % (11/15/17 1208) Vital Signs (24h Range):  Temp:  [97.6 °F (36.4 °C)-98.9 °F (37.2 °C)] 97.6 °F (36.4 °C)  Pulse:  [] 85  Resp:  [16-18] 18  SpO2:  [97 %-100 %] 99 %  BP: (113-131)/(58-78) 113/66       Date 11/15/17 0700 - 11/16/17 0659   Shift 7081-7113 9935-5910 4987-2564 24 Hour Total   I  N  T  A  K  E   P.O. 120   120    Shift Total  (mL/kg) 120  (1.3)   120  (1.3)   O  U  T  P  U  T   Shift Total  (mL/kg)       Weight (kg) 90.2 90.2 90.2 90.2          Neurosurgery Physical Exam   General: well developed, well nourished, no distress  Head: normocephalic, atraumatic  Neurologic: Alert and oriented. Thought content appropriate  GCS: Motor: 6/Verbal: 5/Eyes: 4 GCS Total: 15  Mental Status: Awake, Alert, Oriented x 4  Language: No aphasia  Speech: No dysarthria  Cranial nerves: face symmetric, tongue midline, CN II-XII grossly intact.   Eyes: pupils equal, round, reactive to light with accommodation, EOMI  Pulmonary: normal respirations, not labored, no accessory muscles used  Abdomen: soft, non-distended, not tender to palpation  Sensory: intact to light touch throughout  Motor Strength: Moves all extremities spontaneously  with good tone.  No abnormal movements seen.     Strength  Deltoids Triceps Biceps Wrist Extension Wrist Flexion Hand    Upper: R 5/5 5/5 5/5 5/5 5/5 5/5    L 5/5 5/5 5/5 5/5 5/5 5/5     Iliopsoas Quadriceps Knee  Flexion Tibialis  anterior Gastro- cnemius EHL   Lower: R 5/5 5/5 5/5 5/5 5/5 5/5    L 4/5 5/5 5/5 5/5 5/5 5/5     Pain limited L hip flexor weakness  Pronator Drift: no drift noted  Finger-to-nose: Intact bilaterally  Jones: absent  Clonus: absent  Babinski: absent  Pulses: 2+ and symmetric radial and dorsalis pedis  Skin: warm, dry and intact, no rashes        Significant Labs:    Recent Labs  Lab 11/13/17  1841 11/14/17  0412 11/15/17  0551   GLU 81 99 106    141 141   K 4.2 4.4 4.1    103 105   CO2 30* 27 28   BUN 8 11 10   CREATININE 1.0 0.9 0.9   CALCIUM 10.2 9.7 9.4   MG  --  2.1 1.8       Recent Labs  Lab 11/13/17  1841 11/15/17  0830   WBC 5.39 5.13   HGB 12.7* 11.7*   HCT 39.5* 37.6*   * 344       Recent Labs  Lab 11/14/17 0412   INR 0.9     Microbiology Results (last 7 days)     Procedure Component Value Units Date/Time    Urine culture [[id number]] Collected:  11/13/17 1921    Order Status:  Completed Specimen:  Urine from Urine, Clean Catch Updated:  11/15/17 0752     Urine Culture, Routine Multiple organisms isolated. None in predominance.  Repeat if     Urine Culture, Routine clinically necessary.    Blood culture [[id number]] Collected:  11/13/17 1842    Order Status:  Completed Specimen:  Blood from Peripheral, Antecubital, Left Updated:  11/14/17 2212     Blood Culture, Routine No Growth to date     Blood Culture, Routine No Growth to date    Blood culture [[id number]] Collected:  11/13/17 1842    Order Status:  Completed Specimen:  Blood from Peripheral, Antecubital, Right Updated:  11/14/17 2212     Blood Culture, Routine No Growth to date     Blood Culture, Routine No Growth to date          Significant Diagnostics:  No new imaging
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
## 2017-11-15 NOTE — ASSESSMENT & PLAN NOTE
-  CT lumbar spine concerning for spondylodiscitis at L4-L5.   - NSGY consulted, appreciate recs.   - ESR, CRP mildly elevated.  - Procal normal  - Blood cultures NGTD   - Urine cultures NGTD  - PT/OT   - HIV Abs negative  - Norco for pain relief.  - IR consulted for aspirate/Bx, on him I will check TB beside bacterial Cx.   - keep him off Abx

## 2017-11-15 NOTE — PLAN OF CARE
Problem: Patient Care Overview  Goal: Plan of Care Review  Outcome: Ongoing (interventions implemented as appropriate)  Pt is AOx4. AVSS throughout the night. Able to ambulate independently with walker. Takes frequent smoke breaks downstairs. Administered PRN flexeril & PRN Norco per pt requests for 10/10 pain. Pt refused stool softener. No acute events/changes occurred overnight. WCTM.

## 2017-11-15 NOTE — PLAN OF CARE
Problem: Physical Therapy Goal  Goal: Physical Therapy Goal  Goals to be met by: 17     Patient will increase functional independence with mobility by performin. Supine to sit with Set-up Sloan  2. Sit to supine with Set-up Sloan  3. Rolling to Left and Right with Set-up Assistance.  4. Sit to stand transfer with Supervision  5. Bed to chair transfer with Stand-by Assistance using Rolling Walker  6. Gait  x 50 feet with Supervision using Rolling Walker.   7. Lower extremity exercise program x20 reps per handout, with independence    Outcome: Ongoing (interventions implemented as appropriate)  PT Goals established following initial wilfred Foreman, PT  11/15/2017

## 2017-11-15 NOTE — PROGRESS NOTES
[facility]  Neurosurgery  Progress Note    Subjective:     History of Present Illness: Evgeny Wilde is a 32 y.o. male who presents to [facility] for low back and leg pain. Patient has approximately 1.5 month history of left low back pain with LLE anterior leg pain, for which he underwent MRI of the T and L spine 3 weeks ago at an OSH. He presents today with new onset RLE anterior pain and numbness of the toes bilaterally for the past 2-3 days. He reports 2 episodes of nocturnal incontinence 6 weeks ago, with no episodes since that time. He presents to the ED because he has run out of his prescribed pain medication. Of note, he reports dark colored urine recently. Tachycardic on presentation.    Of note, MRI is not available for review. Radiology reports from OSH reports multiple thoracic schmorl's nodes and mild left L4-5 endplate changes resulting in mild left nf stenosis. No central compression reported.    History of IVDU in 2007.    Post-Op Info:  * No surgery found *       Interval History: Continues to have back pain & leg pain.  No correlating neuro deficits.  No paresthesias or B/B dysfunction.  States last IVDU 2 months ago ( cocaine).  No fevers.    Medications:  Continuous Infusions:   Scheduled Meds:   gabapentin  300 mg Oral TID    nicotine  1 patch Transdermal Daily    senna-docusate 8.6-50 mg  1 tablet Oral BID     PRN Meds:cyclobenzaprine, dextrose 50%, dextrose 50%, glucagon (human recombinant), glucose, glucose, hydrocodone-acetaminophen 7.5-325mg, morphine, sodium chloride 0.9%     Review of Systems  Objective:     Weight: 90.2 kg (198 lb 12.9 oz)  Body mass index is 25.53 kg/m².  Vital Signs (Most Recent):  Temp: 97.6 °F (36.4 °C) (11/15/17 1208)  Pulse: 85 (11/15/17 1208)  Resp: 18 (11/15/17 1208)  BP: 113/66 (11/15/17 1208)  SpO2: 99 % (11/15/17 1208) Vital Signs (24h Range):  Temp:  [97.6 °F (36.4 °C)-98.9 °F (37.2 °C)] 97.6 °F (36.4 °C)  Pulse:  [] 85  Resp:  [16-18]  18  SpO2:  [97 %-100 %] 99 %  BP: (113-131)/(58-78) 113/66       Date 11/15/17 0700 - 11/16/17 0659   Shift 8398-9849 8852-4348 3058-5737 24 Hour Total   I  N  T  A  K  E   P.O. 120   120    Shift Total  (mL/kg) 120  (1.3)   120  (1.3)   O  U  T  P  U  T   Shift Total  (mL/kg)       Weight (kg) 90.2 90.2 90.2 90.2          Neurosurgery Physical Exam   General: well developed, well nourished, no distress  Head: normocephalic, atraumatic  Neurologic: Alert and oriented. Thought content appropriate  GCS: Motor: 6/Verbal: 5/Eyes: 4 GCS Total: 15  Mental Status: Awake, Alert, Oriented x 4  Language: No aphasia  Speech: No dysarthria  Cranial nerves: face symmetric, tongue midline, CN II-XII grossly intact.   Eyes: pupils equal, round, reactive to light with accommodation, EOMI  Pulmonary: normal respirations, not labored, no accessory muscles used  Abdomen: soft, non-distended, not tender to palpation  Sensory: intact to light touch throughout  Motor Strength: Moves all extremities spontaneously with good tone.  No abnormal movements seen.     Strength  Deltoids Triceps Biceps Wrist Extension Wrist Flexion Hand    Upper: R 5/5 5/5 5/5 5/5 5/5 5/5    L 5/5 5/5 5/5 5/5 5/5 5/5     Iliopsoas Quadriceps Knee  Flexion Tibialis  anterior Gastro- cnemius EHL   Lower: R 5/5 5/5 5/5 5/5 5/5 5/5    L 4/5 5/5 5/5 5/5 5/5 5/5     Pain limited L hip flexor weakness  Pronator Drift: no drift noted  Finger-to-nose: Intact bilaterally  Jones: absent  Clonus: absent  Babinski: absent  Pulses: 2+ and symmetric radial and dorsalis pedis  Skin: warm, dry and intact, no rashes        Significant Labs:    Recent Labs  Lab 11/13/17  1841 11/14/17  0412 11/15/17  0551   GLU 81 99 106    141 141   K 4.2 4.4 4.1    103 105   CO2 30* 27 28   BUN 8 11 10   CREATININE 1.0 0.9 0.9   CALCIUM 10.2 9.7 9.4   MG  --  2.1 1.8       Recent Labs  Lab 11/13/17  1841 11/15/17  0830   WBC 5.39 5.13   HGB 12.7* 11.7*   HCT 39.5* 37.6*   PLT  368* 344       Recent Labs  Lab 11/14/17  0412   INR 0.9     Microbiology Results (last 7 days)     Procedure Component Value Units Date/Time    Urine culture [[id number]] Collected:  11/13/17 1921    Order Status:  Completed Specimen:  Urine from Urine, Clean Catch Updated:  11/15/17 0752     Urine Culture, Routine Multiple organisms isolated. None in predominance.  Repeat if     Urine Culture, Routine clinically necessary.    Blood culture [[id number]] Collected:  11/13/17 1842    Order Status:  Completed Specimen:  Blood from Peripheral, Antecubital, Left Updated:  11/14/17 2212     Blood Culture, Routine No Growth to date     Blood Culture, Routine No Growth to date    Blood culture [[id number]] Collected:  11/13/17 1842    Order Status:  Completed Specimen:  Blood from Peripheral, Antecubital, Right Updated:  11/14/17 2212     Blood Culture, Routine No Growth to date     Blood Culture, Routine No Growth to date          Significant Diagnostics:  No new imaging    Assessment/Plan:     * Acute bilateral low back pain with bilateral sciatica    32 y.o. male with 1.5 history of low back pain and left, followed by right, leg pain.   -Neurologically stable  -Lumbar flex/ex does not demonstrate translational instability of lumbar spine  -Recommend IR biopsy of L4-5 disc space  -Recommend infectious disease consult  -Will likely need 6-8 week of IV abx  - CT of the lumbar spine shows endplate destruction of L4-5, concerning for osteodiscitis.   - ESR elevated, CRP mildly elevated, procalcitonin wnl, blood cultures NGTD, urine culture appears to be contaminated  - U tox presumptive positive for benzos, cocaine, opiates, THC. Last IVDU per pt 2 months ago.  -Plan for follow up in clinic in 6 weeks with MRI w/wo contrast & infectious labs, we will schedule  -If fails conservative treatment with abx, will need neurosurgical intervention at that time  -Discussed with Dr. Bauer  -Will sign off at this time, please contact  Nsurg with any questions  -Discussed plan with primary team            Ivet Dobbs PA-C  Neurosurgery  [facility]
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
## 2017-11-15 NOTE — ASSESSMENT & PLAN NOTE
32 y.o. male with 1.5 history of low back pain and left, followed by right, leg pain.   -Neurologically stable  -Lumbar flex/ex does not demonstrate translational instability of lumbar spine  -Recommend IR biopsy of L4-5 disc space  -Recommend infectious disease consult  -Will likely need 6-8 week of IV abx  - CT of the lumbar spine shows endplate destruction of L4-5, concerning for osteodiscitis.   - ESR elevated, CRP mildly elevated, procalcitonin wnl, blood cultures NGTD, urine culture appears to be contaminated  - U tox presumptive positive for benzos, cocaine, opiates, THC. Last IVDU per pt 2 months ago.  -Plan for follow up in clinic in 6 weeks with MRI w/wo contrast & infectious labs, we will schedule  -If fails conservative treatment with abx, will need neurosurgical intervention at that time  -Discussed with Dr. Bauer  -Will sign off at this time, please contact Nsurg with any questions  -Discussed plan with primary team

## 2017-11-16 LAB
ANION GAP SERPL CALC-SCNC: 10 MMOL/L
BASOPHILS # BLD AUTO: 0.03 K/UL
BASOPHILS NFR BLD: 0.7 %
BUN SERPL-MCNC: 11 MG/DL
CALCIUM SERPL-MCNC: 9.6 MG/DL
CHLORIDE SERPL-SCNC: 103 MMOL/L
CO2 SERPL-SCNC: 27 MMOL/L
CREAT SERPL-MCNC: 0.9 MG/DL
DIFFERENTIAL METHOD: ABNORMAL
EOSINOPHIL # BLD AUTO: 0.1 K/UL
EOSINOPHIL NFR BLD: 1.1 %
ERYTHROCYTE [DISTWIDTH] IN BLOOD BY AUTOMATED COUNT: 13.9 %
EST. GFR  (AFRICAN AMERICAN): >60 ML/MIN/1.73 M^2
EST. GFR  (NON AFRICAN AMERICAN): >60 ML/MIN/1.73 M^2
GLUCOSE SERPL-MCNC: 108 MG/DL
HCT VFR BLD AUTO: 35.8 %
HGB BLD-MCNC: 11.2 G/DL
IMM GRANULOCYTES # BLD AUTO: 0.01 K/UL
IMM GRANULOCYTES NFR BLD AUTO: 0.2 %
LYMPHOCYTES # BLD AUTO: 2.2 K/UL
LYMPHOCYTES NFR BLD: 48.2 %
MAGNESIUM SERPL-MCNC: 2.2 MG/DL
MCH RBC QN AUTO: 26.4 PG
MCHC RBC AUTO-ENTMCNC: 31.3 G/DL
MCV RBC AUTO: 84 FL
MONOCYTES # BLD AUTO: 0.6 K/UL
MONOCYTES NFR BLD: 12.9 %
NEUTROPHILS # BLD AUTO: 1.7 K/UL
NEUTROPHILS NFR BLD: 36.9 %
NRBC BLD-RTO: 0 /100 WBC
PHOSPHATE SERPL-MCNC: 4 MG/DL
PLATELET # BLD AUTO: 319 K/UL
PMV BLD AUTO: 9.3 FL
POTASSIUM SERPL-SCNC: 4 MMOL/L
RBC # BLD AUTO: 4.24 M/UL
SODIUM SERPL-SCNC: 140 MMOL/L
WBC # BLD AUTO: 4.5 K/UL

## 2017-11-16 PROCEDURE — 20600001 HC STEP DOWN PRIVATE ROOM

## 2017-11-16 PROCEDURE — 25000003 PHARM REV CODE 250: Performed by: STUDENT IN AN ORGANIZED HEALTH CARE EDUCATION/TRAINING PROGRAM

## 2017-11-16 PROCEDURE — 63600175 PHARM REV CODE 636 W HCPCS: Performed by: STUDENT IN AN ORGANIZED HEALTH CARE EDUCATION/TRAINING PROGRAM

## 2017-11-16 PROCEDURE — 84100 ASSAY OF PHOSPHORUS: CPT

## 2017-11-16 PROCEDURE — 97110 THERAPEUTIC EXERCISES: CPT

## 2017-11-16 PROCEDURE — 36415 COLL VENOUS BLD VENIPUNCTURE: CPT

## 2017-11-16 PROCEDURE — 97530 THERAPEUTIC ACTIVITIES: CPT

## 2017-11-16 PROCEDURE — 99233 SBSQ HOSP IP/OBS HIGH 50: CPT | Mod: ,,, | Performed by: HOSPITALIST

## 2017-11-16 PROCEDURE — 80048 BASIC METABOLIC PNL TOTAL CA: CPT

## 2017-11-16 PROCEDURE — 83735 ASSAY OF MAGNESIUM: CPT

## 2017-11-16 PROCEDURE — 85025 COMPLETE CBC W/AUTO DIFF WBC: CPT

## 2017-11-16 RX ORDER — OXYCODONE HYDROCHLORIDE 5 MG/1
5 TABLET ORAL ONCE
Status: COMPLETED | OUTPATIENT
Start: 2017-11-16 | End: 2017-11-16

## 2017-11-16 RX ADMIN — STANDARDIZED SENNA CONCENTRATE AND DOCUSATE SODIUM 1 TABLET: 8.6; 5 TABLET, FILM COATED ORAL at 11:11

## 2017-11-16 RX ADMIN — CYCLOBENZAPRINE HYDROCHLORIDE 5 MG: 5 TABLET, FILM COATED ORAL at 11:11

## 2017-11-16 RX ADMIN — HYDROCODONE BITARTRATE AND ACETAMINOPHEN 1 TABLET: 7.5; 325 TABLET ORAL at 06:11

## 2017-11-16 RX ADMIN — HYDROCODONE BITARTRATE AND ACETAMINOPHEN 1 TABLET: 7.5; 325 TABLET ORAL at 12:11

## 2017-11-16 RX ADMIN — OXYCODONE HYDROCHLORIDE 5 MG: 5 TABLET ORAL at 04:11

## 2017-11-16 RX ADMIN — MORPHINE SULFATE 4 MG: 2 INJECTION, SOLUTION INTRAMUSCULAR; INTRAVENOUS at 07:11

## 2017-11-16 RX ADMIN — GABAPENTIN 300 MG: 300 CAPSULE ORAL at 09:11

## 2017-11-16 RX ADMIN — GABAPENTIN 300 MG: 300 CAPSULE ORAL at 01:11

## 2017-11-16 RX ADMIN — STANDARDIZED SENNA CONCENTRATE AND DOCUSATE SODIUM 1 TABLET: 8.6; 5 TABLET, FILM COATED ORAL at 09:11

## 2017-11-16 RX ADMIN — CYCLOBENZAPRINE HYDROCHLORIDE 5 MG: 5 TABLET, FILM COATED ORAL at 09:11

## 2017-11-16 RX ADMIN — MORPHINE SULFATE 4 MG: 2 INJECTION, SOLUTION INTRAMUSCULAR; INTRAVENOUS at 01:11

## 2017-11-16 RX ADMIN — CYCLOBENZAPRINE HYDROCHLORIDE 5 MG: 5 TABLET, FILM COATED ORAL at 03:11

## 2017-11-16 RX ADMIN — GABAPENTIN 300 MG: 300 CAPSULE ORAL at 06:11

## 2017-11-16 NOTE — ASSESSMENT & PLAN NOTE
- NSGY consult, appreciate recs  *recommend IR aspiration of L4-L5 disc  Plan for follow up in clinic in 6 weeks with MRI w/wo contrast & infectious labs.  - Scheduled for biopsy/aspiration on 11/17

## 2017-11-16 NOTE — NURSING
Patient resting in bed. Ambulated in room and hallway multiple times this shift. Requests prn pain meds when they are next available. Vs stable. Family and friends at bedside. Npo after midnight for AM procedure on spine

## 2017-11-16 NOTE — SUBJECTIVE & OBJECTIVE
Interval History: NAEON. Vitals Stable.     Review of Systems   Constitutional: Negative for chills and fever.   HENT: Negative for trouble swallowing.    Eyes: Negative for visual disturbance.   Respiratory: Negative for cough and shortness of breath.    Cardiovascular: Negative for chest pain, palpitations and leg swelling.   Gastrointestinal: Negative for abdominal pain, diarrhea and vomiting.   Musculoskeletal: Positive for back pain. Negative for neck pain.   Neurological: Negative for dizziness and headaches.   Hematological: Negative for adenopathy. Does not bruise/bleed easily.     Objective:     Vital Signs (Most Recent):  Temp: 97.5 °F (36.4 °C) (11/16/17 1140)  Pulse: 87 (11/16/17 1140)  Resp: 18 (11/16/17 1140)  BP: 119/78 (11/16/17 1140)  SpO2: 99 % (11/16/17 1140) Vital Signs (24h Range):  Temp:  [97.5 °F (36.4 °C)-98.2 °F (36.8 °C)] 97.5 °F (36.4 °C)  Pulse:  [] 87  Resp:  [16-18] 18  SpO2:  [98 %-100 %] 99 %  BP: (118-121)/(60-78) 119/78     Weight: 90.2 kg (198 lb 13.7 oz)  Body mass index is 25.53 kg/m².    Intake/Output Summary (Last 24 hours) at 11/16/17 1402  Last data filed at 11/15/17 1800   Gross per 24 hour   Intake              240 ml   Output                0 ml   Net              240 ml      Physical Exam    Significant Labs:   A1C:   Recent Labs  Lab 11/13/17  1841   HGBA1C 5.7*     ABGs: No results for input(s): PH, PCO2, HCO3, POCSATURATED, BE, TOTALHB, COHB, METHB, O2HB, POCFIO2 in the last 48 hours.  Bilirubin: No results for input(s): BILIDIR, BILIRUBINTOT, BILITOT in the last 720 hours.  Blood Culture: No results for input(s): LABBLOO in the last 48 hours.  BMP:   Recent Labs  Lab 11/16/17  0521         K 4.0      CO2 27   BUN 11   CREATININE 0.9   CALCIUM 9.6   MG 2.2     CBC:   Recent Labs  Lab 11/15/17  0830 11/16/17  0521   WBC 5.13 4.50   HGB 11.7* 11.2*   HCT 37.6* 35.8*    319     CMP:   Recent Labs  Lab 11/15/17  0551 11/16/17  0521     140   K 4.1 4.0    103   CO2 28 27    108   BUN 10 11   CREATININE 0.9 0.9   CALCIUM 9.4 9.6   ANIONGAP 8 10   EGFRNONAA >60.0 >60.0     Cardiac Markers: No results for input(s): CKMB, MYOGLOBIN, BNP, TROPISTAT in the last 48 hours.  Coagulation: No results for input(s): INR, APTT in the last 48 hours.    Invalid input(s): PT  Lactic Acid: No results for input(s): LACTATE in the last 48 hours.  Lipase: No results for input(s): LIPASE in the last 48 hours.  Lipid Panel: No results for input(s): CHOL, HDL, LDLCALC, TRIG, CHOLHDL in the last 48 hours.  Magnesium:   Recent Labs  Lab 11/15/17  0551 11/16/17  0521   MG 1.8 2.2     Pathology Results  (Last 10 years)    None        POCT Glucose: No results for input(s): POCTGLUCOSE in the last 48 hours.  Prealbumin: No results for input(s): PREALBUMIN in the last 48 hours.  Respiratory Culture: No results for input(s): GSRESP, RESPIRATORYC in the last 48 hours.  Troponin: No results for input(s): TROPONINI in the last 48 hours.  TSH:   Recent Labs  Lab 11/14/17  0412   TSH 1.664     Urine Culture: No results for input(s): LABURIN in the last 48 hours.  Urine Studies: No results for input(s): COLORU, APPEARANCEUA, PHUR, SPECGRAV, PROTEINUA, GLUCUA, KETONESU, BILIRUBINUA, OCCULTUA, NITRITE, UROBILINOGEN, LEUKOCYTESUR, RBCUA, WBCUA, BACTERIA, SQUAMEPITHEL, HYALINECASTS in the last 48 hours.    Invalid input(s): WRIGHTSUR  All pertinent labs within the past 24 hours have been reviewed.    Significant Imaging: I have reviewed all pertinent imaging results/findings within the past 24 hours.

## 2017-11-16 NOTE — PLAN OF CARE
Problem: Patient Care Overview  Goal: Plan of Care Review  Outcome: Ongoing (interventions implemented as appropriate)  Patient AAOx4, VSS, complained of pain frequently throughout shift. PRN pain meds given. Pain not resolved, provider notified, one time dose of oxycodone given. Pain management with repositioning and heat packs also provided. Patient ambulates frequently off the unit during the day. Bed low and locked, walker at bedside, gate steady with use of walker, call light and possessions within reach, remains free of falls, will continue to monitor.

## 2017-11-16 NOTE — PT/OT/SLP PROGRESS
"Occupational Therapy  Treatment    Evgeny Wilde   MRN: [id number]   Admitting Diagnosis: Acute bilateral low back pain with bilateral sciatica    OT Date of Treatment: 11/16/17   OT Start Time: 0954  OT Stop Time: 1018  OT Total Time (min): 24 min    Billable Minutes:  Therapeutic Activity 12 and Therapeutic Exercise 12    General Precautions: Standard, fall  Orthopedic Precautions: N/A  Braces: N/A    Do you have any cultural, spiritual, Roman Catholic conflicts, given your current situation?: none stated    Subjective:  Communicated with RN prior to session.  Arrived to patient room at first attempt and pt stated he had morphine and "siezed out" and thought small emesis was urinal and there was urine all over the floor and filling the emesis basin.  Got nurse and floor cleaned and pt settled.  Returned 955 for session.    Pain/Comfort  Pain Rating 1: 8/10  Location - Side 1: Bilateral  Location - Orientation 1: lower  Location 1: back  Pain Addressed 1: Reposition, Other (see comments) (deep breathing, sciatica pressure points)  Pain Rating Post-Intervention 1: 6/10    Objective:  Patient found with: telemetry     Functional Mobility:  Bed Mobility:  Rolling/Turning to Left: Independent (pain)  Rolling/Turning Right: Independent (pain)  Scooting/Bridging: Independent (pain)  Supine to Sit: Independent (pain)    Transfers:   Sit <> Stand Assistance: Supervision  Sit <> Stand Assistive Device: Rolling Walker    Functional Ambulation: Pt able to demonstrate effective mobility with rolling walker in room and hallway  Activities of Daily Living:  Feeding Level of Assistance: Independent  UE Dressing Level of Assistance: Contact guard  LE Dressing Level of Assistance: Maximum assistance   Toileting Where Assessed: Toilet  Toileting Level of Assistance: Stand by assistance     Balance:   Static Sit: NORMAL: No deviations seen in posture held statically  Dynamic Sit: NORMAL: No deviations seen in posture held dynamically  Static " "Stand: NORMAL: No deviations seen in posture held statically  Dynamic stand: NORMAL: No deviations seen in posture held dynamically    Therapeutic Activities and Exercises:  Pt educated on sciatica relief and etiology and exercises.  Pt appreciative and with relief.  Able to perform safe mobility and toilet transfers    AM-PAC 6 CLICK ADL   How much help from another person does this patient currently need?   1 = Unable, Total/Dependent Assistance  2 = A lot, Maximum/Moderate Assistance  3 = A little, Minimum/Contact Guard/Supervision  4 = None, Modified [place]/Independent    Putting on and taking off regular lower body clothing? : 2  Bathing (including washing, rinsing, drying)?: 2  Toileting, which includes using toilet, bedpan, or urinal? : 3  Putting on and taking off regular upper body clothing?: 3  Taking care of personal grooming such as brushing teeth?: 4  Eating meals?: 4  Total Score: 18     AM-PAC Raw Score CMS "G-Code Modifier Level of Impairment Assistance   6 % Total / Unable   7 - 8 CM 80 - 100% Maximal Assist   9-13 CL 60 - 80% Moderate Assist   14 - 19 CK 40 - 60% Moderate Assist   20 - 22 CJ 20 - 40% Minimal Assist   23 CI 1-20% SBA / CGA   24 CH 0% Independent/ Mod I       Patient left supine with all lines intact and call button in reach    ASSESSMENT:  Evgeny Wilde is a 32 y.o. male with a medical diagnosis of Acute bilateral low back pain with bilateral sciatica and presents with improved tolerance of bed mobility and with increased self care participation.  Pt awaiting surgery and with ccomplaints of hunger and they keep postponing procedure and he hasnt been allowed to eat.  Significant education on sciatica pain and relief with good understanding and some relief.  Cont with POC for max functional performance.  .    Rehab identified problem list/impairments: Rehab identified problem list/impairments: weakness    Rehab potential is good.    Activity tolerance: Good    Discharge " recommendations: Discharge Facility/Level Of Care Needs: home with home health     Barriers to discharge: Barriers to Discharge: Inaccessible home environment    Equipment recommendations: bath bench     GOALS:    Occupational Therapy Goals        Problem: Occupational Therapy Goal    Goal Priority Disciplines Outcome Interventions   Occupational Therapy Goal     OT, PT/OT Ongoing (interventions implemented as appropriate)    Description:  Goals to be met by: 12/10/17    Patient will increase functional independence with ADLs by performing:    UE Dressing with Day.  LE Dressing with Day.  Grooming while standing at sink with Day.  Toileting from toilet with Day for hygiene and clothing management.   Bathing from  sitting at sink with Day.                      Plan:  Patient to be seen 3 x/week to address the above listed problems via self-care/home management, therapeutic activities, therapeutic exercises  Plan of Care expires: 12/14/17  Plan of Care reviewed with: patient         Palak Nobles, OT  11/16/2017

## 2017-11-16 NOTE — PROGRESS NOTES
[facility] Hospital Medicine  Progress Note    Patient Name: Evgeny Wilde  MRN: [id number]  Patient Class: IP- Inpatient   Admission Date: 11/13/2017  Length of Stay: 3 days  Attending Physician: Davian Payne, *  Primary Care Provider: Primary Doctor [facility] Medicine Team: [facility] HOSP MED 2 Rad Fall MD    Subjective:     Principal Problem:Acute bilateral low back pain with bilateral sciatica    HPI:  32 years old male with no significant past medical Hx, present to the ED with progressive lower back pain with sciatica for the last month. He works on constructions, pain start suddenly 1 month ago, at the lower back, dull, aggravated with movements. No inciting factors. Went to [facility] and [facility] ER, he had CT and MRI and they told him he had disk problem and he need neuro follow up and provided with muscle relaxant and narcotic for pain control. Patient stated the medicine is gone and the ain is worse with numbness in his left leg. He is not working for the last month. Denied any trauma, recent infection, surgery, foreign body, recent IV drug abuse. Also denied any fever, mekhi loss, fecal or urine incontinence, saddle anaesthesia.          Hospital Course:  11/14: NSGY following             Blood cultures NGTD, Urine cultures pending.              CT lumbar spine concerning for spondylodiscitis.  11/15: afebrile overnight, blood CX NGTD, NSGY stated no indications for NSGY interventions this admission, they want follow him up as outpatient after after Abx. We will pursue IR for disk Bx/aspirate for Dx.       11/16: NAEON. Vitals stable. Pending disc aspiration via IR tomorrow.    Interval History: NAEON. Vitals Stable.     Review of Systems   Constitutional: Negative for chills and fever.   HENT: Negative for trouble swallowing.    Eyes: Negative for visual disturbance.   Respiratory: Negative for cough and shortness of breath.    Cardiovascular: Negative for chest pain,  palpitations and leg swelling.   Gastrointestinal: Negative for abdominal pain, diarrhea and vomiting.   Musculoskeletal: Positive for back pain. Negative for neck pain.   Neurological: Negative for dizziness and headaches.   Hematological: Negative for adenopathy. Does not bruise/bleed easily.     Objective:     Vital Signs (Most Recent):  Temp: 97.5 °F (36.4 °C) (11/16/17 1140)  Pulse: 87 (11/16/17 1140)  Resp: 18 (11/16/17 1140)  BP: 119/78 (11/16/17 1140)  SpO2: 99 % (11/16/17 1140) Vital Signs (24h Range):  Temp:  [97.5 °F (36.4 °C)-98.2 °F (36.8 °C)] 97.5 °F (36.4 °C)  Pulse:  [] 87  Resp:  [16-18] 18  SpO2:  [98 %-100 %] 99 %  BP: (118-121)/(60-78) 119/78     Weight: 90.2 kg (198 lb 13.7 oz)  Body mass index is 25.53 kg/m².    Intake/Output Summary (Last 24 hours) at 11/16/17 1402  Last data filed at 11/15/17 1800   Gross per 24 hour   Intake              240 ml   Output                0 ml   Net              240 ml      Physical Exam    Significant Labs:   A1C:   Recent Labs  Lab 11/13/17  1841   HGBA1C 5.7*     ABGs: No results for input(s): PH, PCO2, HCO3, POCSATURATED, BE, TOTALHB, COHB, METHB, O2HB, POCFIO2 in the last 48 hours.  Bilirubin: No results for input(s): BILIDIR, BILIRUBINTOT, BILITOT in the last 720 hours.  Blood Culture: No results for input(s): LABBLOO in the last 48 hours.  BMP:   Recent Labs  Lab 11/16/17  0521         K 4.0      CO2 27   BUN 11   CREATININE 0.9   CALCIUM 9.6   MG 2.2     CBC:   Recent Labs  Lab 11/15/17  0830 11/16/17  0521   WBC 5.13 4.50   HGB 11.7* 11.2*   HCT 37.6* 35.8*    319     CMP:   Recent Labs  Lab 11/15/17  0551 11/16/17  0521    140   K 4.1 4.0    103   CO2 28 27    108   BUN 10 11   CREATININE 0.9 0.9   CALCIUM 9.4 9.6   ANIONGAP 8 10   EGFRNONAA >60.0 >60.0     Cardiac Markers: No results for input(s): CKMB, MYOGLOBIN, BNP, TROPISTAT in the last 48 hours.  Coagulation: No results for input(s): INR, APTT in  the last 48 hours.    Invalid input(s): PT  Lactic Acid: No results for input(s): LACTATE in the last 48 hours.  Lipase: No results for input(s): LIPASE in the last 48 hours.  Lipid Panel: No results for input(s): CHOL, HDL, LDLCALC, TRIG, CHOLHDL in the last 48 hours.  Magnesium:   Recent Labs  Lab 11/15/17  0551 11/16/17  0521   MG 1.8 2.2     Pathology Results  (Last 10 years)    None        POCT Glucose: No results for input(s): POCTGLUCOSE in the last 48 hours.  Prealbumin: No results for input(s): PREALBUMIN in the last 48 hours.  Respiratory Culture: No results for input(s): GSRESP, RESPIRATORYC in the last 48 hours.  Troponin: No results for input(s): TROPONINI in the last 48 hours.  TSH:   Recent Labs  Lab 11/14/17  0412   TSH 1.664     Urine Culture: No results for input(s): LABURIN in the last 48 hours.  Urine Studies: No results for input(s): COLORU, APPEARANCEUA, PHUR, SPECGRAV, PROTEINUA, GLUCUA, KETONESU, BILIRUBINUA, OCCULTUA, NITRITE, UROBILINOGEN, LEUKOCYTESUR, RBCUA, WBCUA, BACTERIA, SQUAMEPITHEL, HYALINECASTS in the last 48 hours.    Invalid input(s): WRIGHTSUR  All pertinent labs within the past 24 hours have been reviewed.    Significant Imaging: I have reviewed all pertinent imaging results/findings within the past 24 hours.    Assessment/Plan:      * Acute bilateral low back pain with bilateral sciatica    -  CT lumbar spine concerning for spondylodiscitis at L4-L5.   - NSGY consulted, appreciate recs.   - ESR, CRP mildly elevated.  - Procal normal  - Blood cultures NGTD   - Urine cultures NGTD  - PT/OT   - HIV Abs negative  - Norco for pain relief.  - IR consulted for aspirate/Bx, planned for aspiration on 11/17  - keep him off Abx            Discitis of lumbar region    - NSGY consult, appreciate recs  *recommend IR aspiration of L4-L5 disc  Plan for follow up in clinic in 6 weeks with MRI w/wo contrast & infectious labs.  - Scheduled for biopsy/aspiration on 11/17          VTE Risk  Mitigation         Ordered     Medium Risk of VTE  Once      11/13/17 1910              Rad Fall MD  Department of Hospital Medicine   [facility]

## 2017-11-16 NOTE — ASSESSMENT & PLAN NOTE
-  CT lumbar spine concerning for spondylodiscitis at L4-L5.   - NSGY consulted, appreciate recs.   - ESR, CRP mildly elevated.  - Procal normal  - Blood cultures NGTD   - Urine cultures NGTD  - PT/OT   - HIV Abs negative  - Norco for pain relief.  - IR consulted for aspirate/Bx, planned for aspiration on 11/17  - keep him off Abx

## 2017-11-16 NOTE — PLAN OF CARE
Problem: Occupational Therapy Goal  Goal: Occupational Therapy Goal  Goals to be met by: 12/10/17    Patient will increase functional independence with ADLs by performing:    UE Dressing with Steen.  LE Dressing with Steen.  Grooming while standing at sink with Steen.  Toileting from toilet with Steen for hygiene and clothing management.   Bathing from  sitting at sink with Steen.     Outcome: Ongoing (interventions implemented as appropriate)  Goals unmet and Cont with POC  Palak Nobles OT  11/16/2017

## 2017-11-17 LAB
ANION GAP SERPL CALC-SCNC: 6 MMOL/L
BASOPHILS # BLD AUTO: 0.01 K/UL
BASOPHILS NFR BLD: 0.2 %
BUN SERPL-MCNC: 10 MG/DL
CALCIUM SERPL-MCNC: 9.5 MG/DL
CHLORIDE SERPL-SCNC: 104 MMOL/L
CO2 SERPL-SCNC: 29 MMOL/L
CREAT SERPL-MCNC: 0.9 MG/DL
DIFFERENTIAL METHOD: ABNORMAL
EOSINOPHIL # BLD AUTO: 0 K/UL
EOSINOPHIL NFR BLD: 0.9 %
ERYTHROCYTE [DISTWIDTH] IN BLOOD BY AUTOMATED COUNT: 14 %
EST. GFR  (AFRICAN AMERICAN): >60 ML/MIN/1.73 M^2
EST. GFR  (NON AFRICAN AMERICAN): >60 ML/MIN/1.73 M^2
GLUCOSE SERPL-MCNC: 88 MG/DL
GRAM STN SPEC: NORMAL
GRAM STN SPEC: NORMAL
HCT VFR BLD AUTO: 35.2 %
HGB BLD-MCNC: 11.2 G/DL
IMM GRANULOCYTES # BLD AUTO: 0.01 K/UL
IMM GRANULOCYTES NFR BLD AUTO: 0.2 %
LYMPHOCYTES # BLD AUTO: 2 K/UL
LYMPHOCYTES NFR BLD: 46.7 %
MAGNESIUM SERPL-MCNC: 2.2 MG/DL
MCH RBC QN AUTO: 27.2 PG
MCHC RBC AUTO-ENTMCNC: 31.8 G/DL
MCV RBC AUTO: 85 FL
MONOCYTES # BLD AUTO: 0.6 K/UL
MONOCYTES NFR BLD: 12.6 %
NEUTROPHILS # BLD AUTO: 1.7 K/UL
NEUTROPHILS NFR BLD: 39.4 %
NRBC BLD-RTO: 0 /100 WBC
PHOSPHATE SERPL-MCNC: 3.7 MG/DL
PLATELET # BLD AUTO: 305 K/UL
PMV BLD AUTO: 9.6 FL
POTASSIUM SERPL-SCNC: 4.5 MMOL/L
RBC # BLD AUTO: 4.12 M/UL
SODIUM SERPL-SCNC: 139 MMOL/L
WBC # BLD AUTO: 4.37 K/UL

## 2017-11-17 PROCEDURE — 0Q903ZX DRAINAGE OF LUMBAR VERTEBRA, PERCUTANEOUS APPROACH, DIAGNOSTIC: ICD-10-PCS | Performed by: RADIOLOGY

## 2017-11-17 PROCEDURE — 87015 SPECIMEN INFECT AGNT CONCNTJ: CPT

## 2017-11-17 PROCEDURE — 88304 TISSUE EXAM BY PATHOLOGIST: CPT | Mod: 26,,, | Performed by: PATHOLOGY

## 2017-11-17 PROCEDURE — 87070 CULTURE OTHR SPECIMN AEROBIC: CPT

## 2017-11-17 PROCEDURE — 20600001 HC STEP DOWN PRIVATE ROOM

## 2017-11-17 PROCEDURE — 84100 ASSAY OF PHOSPHORUS: CPT

## 2017-11-17 PROCEDURE — 85025 COMPLETE CBC W/AUTO DIFF WBC: CPT

## 2017-11-17 PROCEDURE — 80048 BASIC METABOLIC PNL TOTAL CA: CPT

## 2017-11-17 PROCEDURE — 63600175 PHARM REV CODE 636 W HCPCS: Performed by: RADIOLOGY

## 2017-11-17 PROCEDURE — 83735 ASSAY OF MAGNESIUM: CPT

## 2017-11-17 PROCEDURE — 87075 CULTR BACTERIA EXCEPT BLOOD: CPT

## 2017-11-17 PROCEDURE — 99233 SBSQ HOSP IP/OBS HIGH 50: CPT | Mod: ,,, | Performed by: HOSPITALIST

## 2017-11-17 PROCEDURE — 87205 SMEAR GRAM STAIN: CPT

## 2017-11-17 PROCEDURE — 97166 OT EVAL MOD COMPLEX 45 MIN: CPT

## 2017-11-17 PROCEDURE — 88304 TISSUE EXAM BY PATHOLOGIST: CPT | Performed by: PATHOLOGY

## 2017-11-17 PROCEDURE — 87102 FUNGUS ISOLATION CULTURE: CPT

## 2017-11-17 PROCEDURE — 0QB03ZX EXCISION OF LUMBAR VERTEBRA, PERCUTANEOUS APPROACH, DIAGNOSTIC: ICD-10-PCS | Performed by: RADIOLOGY

## 2017-11-17 PROCEDURE — 87116 MYCOBACTERIA CULTURE: CPT

## 2017-11-17 PROCEDURE — 36415 COLL VENOUS BLD VENIPUNCTURE: CPT

## 2017-11-17 PROCEDURE — 63600175 PHARM REV CODE 636 W HCPCS: Performed by: STUDENT IN AN ORGANIZED HEALTH CARE EDUCATION/TRAINING PROGRAM

## 2017-11-17 PROCEDURE — 25000003 PHARM REV CODE 250: Performed by: STUDENT IN AN ORGANIZED HEALTH CARE EDUCATION/TRAINING PROGRAM

## 2017-11-17 RX ORDER — GABAPENTIN 400 MG/1
400 CAPSULE ORAL 3 TIMES DAILY
Status: DISCONTINUED | OUTPATIENT
Start: 2017-11-17 | End: 2017-11-18

## 2017-11-17 RX ORDER — MIDAZOLAM HYDROCHLORIDE 1 MG/ML
INJECTION INTRAMUSCULAR; INTRAVENOUS CODE/TRAUMA/SEDATION MEDICATION
Status: COMPLETED | OUTPATIENT
Start: 2017-11-17 | End: 2017-11-17

## 2017-11-17 RX ORDER — FENTANYL CITRATE 50 UG/ML
INJECTION, SOLUTION INTRAMUSCULAR; INTRAVENOUS CODE/TRAUMA/SEDATION MEDICATION
Status: COMPLETED | OUTPATIENT
Start: 2017-11-17 | End: 2017-11-17

## 2017-11-17 RX ADMIN — HYDROCODONE BITARTRATE AND ACETAMINOPHEN 1 TABLET: 7.5; 325 TABLET ORAL at 06:11

## 2017-11-17 RX ADMIN — GABAPENTIN 400 MG: 400 CAPSULE ORAL at 10:11

## 2017-11-17 RX ADMIN — FENTANYL CITRATE 25 MCG: 50 INJECTION, SOLUTION INTRAMUSCULAR; INTRAVENOUS at 09:11

## 2017-11-17 RX ADMIN — MIDAZOLAM HYDROCHLORIDE 0.5 MG: 1 INJECTION, SOLUTION INTRAMUSCULAR; INTRAVENOUS at 09:11

## 2017-11-17 RX ADMIN — HYDROCODONE BITARTRATE AND ACETAMINOPHEN 1 TABLET: 7.5; 325 TABLET ORAL at 04:11

## 2017-11-17 RX ADMIN — GABAPENTIN 300 MG: 300 CAPSULE ORAL at 06:11

## 2017-11-17 RX ADMIN — MORPHINE SULFATE 4 MG: 2 INJECTION, SOLUTION INTRAMUSCULAR; INTRAVENOUS at 01:11

## 2017-11-17 RX ADMIN — MIDAZOLAM HYDROCHLORIDE 1 MG: 1 INJECTION, SOLUTION INTRAMUSCULAR; INTRAVENOUS at 09:11

## 2017-11-17 RX ADMIN — GABAPENTIN 300 MG: 300 CAPSULE ORAL at 02:11

## 2017-11-17 RX ADMIN — HYDROCODONE BITARTRATE AND ACETAMINOPHEN 1 TABLET: 7.5; 325 TABLET ORAL at 10:11

## 2017-11-17 RX ADMIN — MORPHINE SULFATE 4 MG: 2 INJECTION, SOLUTION INTRAMUSCULAR; INTRAVENOUS at 12:11

## 2017-11-17 RX ADMIN — CYCLOBENZAPRINE HYDROCHLORIDE 5 MG: 5 TABLET, FILM COATED ORAL at 02:11

## 2017-11-17 RX ADMIN — CYCLOBENZAPRINE HYDROCHLORIDE 5 MG: 5 TABLET, FILM COATED ORAL at 08:11

## 2017-11-17 RX ADMIN — STANDARDIZED SENNA CONCENTRATE AND DOCUSATE SODIUM 1 TABLET: 8.6; 5 TABLET, FILM COATED ORAL at 08:11

## 2017-11-17 RX ADMIN — MORPHINE SULFATE 4 MG: 2 INJECTION, SOLUTION INTRAMUSCULAR; INTRAVENOUS at 08:11

## 2017-11-17 RX ADMIN — HYDROCODONE BITARTRATE AND ACETAMINOPHEN 1 TABLET: 7.5; 325 TABLET ORAL at 12:11

## 2017-11-17 RX ADMIN — CYCLOBENZAPRINE HYDROCHLORIDE 5 MG: 5 TABLET, FILM COATED ORAL at 06:11

## 2017-11-17 RX ADMIN — FENTANYL CITRATE 50 MCG: 50 INJECTION, SOLUTION INTRAMUSCULAR; INTRAVENOUS at 09:11

## 2017-11-17 NOTE — SUBJECTIVE & OBJECTIVE
Interval History: NAEON. Vitals stable.    Review of Systems   Constitutional: Negative for chills, fatigue and fever.   Respiratory: Negative for cough, chest tightness and shortness of breath.    Cardiovascular: Negative for chest pain, palpitations and leg swelling.   Gastrointestinal: Negative for abdominal pain, diarrhea and vomiting.   Genitourinary: Negative for difficulty urinating.   Musculoskeletal: Positive for back pain.   Neurological: Positive for numbness. Negative for dizziness, weakness and light-headedness.   Psychiatric/Behavioral: Negative for agitation and confusion.     Objective:     Vital Signs (Most Recent):  Temp: 97.3 °F (36.3 °C) (11/17/17 1104)  Pulse: 86 (11/17/17 1104)  Resp: 16 (11/17/17 1104)  BP: 118/77 (11/17/17 1104)  SpO2: 99 % (11/17/17 1104) Vital Signs (24h Range):  Temp:  [97.3 °F (36.3 °C)-98.6 °F (37 °C)] 97.3 °F (36.3 °C)  Pulse:  [] 86  Resp:  [11-18] 16  SpO2:  [95 %-100 %] 99 %  BP: ()/(56-90) 118/77     Weight: 90.2 kg (198 lb 13.7 oz)  Body mass index is 25.53 kg/m².    Intake/Output Summary (Last 24 hours) at 11/17/17 1143  Last data filed at 11/16/17 1800   Gross per 24 hour   Intake              400 ml   Output                0 ml   Net              400 ml      Physical Exam   Constitutional: He is oriented to person, place, and time. No distress.   Eyes: Pupils are equal, round, and reactive to light.   Cardiovascular: Normal rate, regular rhythm and normal heart sounds.    Pulmonary/Chest: Effort normal and breath sounds normal.   Abdominal: Soft. Bowel sounds are normal.   Musculoskeletal: He exhibits no edema.   Neurological: He is alert and oriented to person, place, and time. A sensory deficit is present.   Bilateral sensory loss over L5 distribution.   Nursing note and vitals reviewed.      Significant Labs:   A1C:   Recent Labs  Lab 11/13/17  1841   HGBA1C 5.7*     ABGs: No results for input(s): PH, PCO2, HCO3, POCSATURATED, BE, TOTALHB, COHB,  METHB, O2HB, POCFIO2 in the last 48 hours.  Bilirubin: No results for input(s): BILIDIR, BILIRUBINTOT, BILITOT in the last 720 hours.  Blood Culture: No results for input(s): LABBLOO in the last 48 hours.  BMP:   Recent Labs  Lab 11/17/17  0559   GLU 88      K 4.5      CO2 29   BUN 10   CREATININE 0.9   CALCIUM 9.5   MG 2.2     CBC:   Recent Labs  Lab 11/16/17 0521 11/17/17 0559   WBC 4.50 4.37   HGB 11.2* 11.2*   HCT 35.8* 35.2*    305     CMP:   Recent Labs  Lab 11/16/17 0521 11/17/17 0559    139   K 4.0 4.5    104   CO2 27 29    88   BUN 11 10   CREATININE 0.9 0.9   CALCIUM 9.6 9.5   ANIONGAP 10 6*   EGFRNONAA >60.0 >60.0     Cardiac Markers: No results for input(s): CKMB, MYOGLOBIN, BNP, TROPISTAT in the last 48 hours.  Coagulation: No results for input(s): INR, APTT in the last 48 hours.    Invalid input(s): PT  Lactic Acid: No results for input(s): LACTATE in the last 48 hours.  Lipase: No results for input(s): LIPASE in the last 48 hours.  Lipid Panel: No results for input(s): CHOL, HDL, LDLCALC, TRIG, CHOLHDL in the last 48 hours.  Magnesium:   Recent Labs  Lab 11/16/17 0521 11/17/17 0559   MG 2.2 2.2     Pathology Results  (Last 10 years)    None        POCT Glucose: No results for input(s): POCTGLUCOSE in the last 48 hours.  Prealbumin: No results for input(s): PREALBUMIN in the last 48 hours.  Respiratory Culture: No results for input(s): GSRESP, RESPIRATORYC in the last 48 hours.  Troponin: No results for input(s): TROPONINI in the last 48 hours.  TSH:   Recent Labs  Lab 11/14/17  0412   TSH 1.664     Urine Culture: No results for input(s): LABURIN in the last 48 hours.  Urine Studies: No results for input(s): COLORU, APPEARANCEUA, PHUR, SPECGRAV, PROTEINUA, GLUCUA, KETONESU, BILIRUBINUA, OCCULTUA, NITRITE, UROBILINOGEN, LEUKOCYTESUR, RBCUA, WBCUA, BACTERIA, SQUAMEPITHEL, HYALINECASTS in the last 48 hours.    Invalid input(s): WRIGHTSUR  All pertinent labs  within the past 24 hours have been reviewed.    Significant Imaging: I have reviewed all pertinent imaging results/findings within the past 24 hours.

## 2017-11-17 NOTE — PROCEDURES
Radiology Post-Procedure Note    Pre Op Diagnosis: L4-5 diskitis    Post Op Diagnosis:  Same    Procedure: L4-5 biopsy    Procedure performed by: Dr. Liao, Dr. García    Written Informed Consent Obtained: Yes    Specimen Removed: YES, bloody disk and endplate material    Estimated Blood Loss: Minimal    Specimen sent to pathology.   Additional specimen sent to lab: Yes  Patient tolerated procedure well.    Zeke García MD  Neurointerventional Fellow  Department of Radiology  [phone]

## 2017-11-17 NOTE — PLAN OF CARE
"Problem: Patient Care Overview  Goal: Plan of Care Review  Outcome: Ongoing (interventions implemented as appropriate)  Pt AAOx4. AVSS. Pt c/o pain "12/10" throughout shift. PRN pain medication given with relief. Pt remains free from falls. Pt ambulates frequently downstairs. Pt safety maintained. Hourly rounding performed.       "

## 2017-11-17 NOTE — PROGRESS NOTES
Report received from BRYCE Ruiz. Patient resting quietly in stretcher.  Respirations even and unlabored, no apparent distress.  Stretcher locked, in lowest position, side rail up.  Patient placed on continuous Denies needs at this time.  Will continue to monitor.

## 2017-11-17 NOTE — PROGRESS NOTES
L4-L5 disc aspiration completed, pt tolerated well. No apparent distress noted. Band aid applied CDI. Pt to be transfered to [facility], report to be given at bedside.

## 2017-11-17 NOTE — ASSESSMENT & PLAN NOTE
-  CT lumbar spine concerning for spondylodiscitis at L4-L5.   - NSGY consulted, appreciate recs.   - ESR, CRP mildly elevated.  - Procal normal  - Blood cultures NGTD   - Urine cultures NGTD  - PT/OT   - HIV Abs negative  - Norco and morphine for pain relief.  - S/p L4-L5 biopsy by IR . Sample sent for pathology. Results awaited.

## 2017-11-17 NOTE — PLAN OF CARE
11/17/17 1506   Discharge Reassessment   Assessment Type Discharge Planning Reassessment   Provided patient/caregiver education on the expected discharge date and the discharge plan Yes   Do you have any problems affording any of your prescribed medications? TBD   Discharge Plan A Home with family   Can the patient answer the patient profile reliably? Yes, cognitively intact   How does the patient rate their overall health at the present time? Fair   Describe the patient's ability to walk at the present time. Walks with the help of equipment   How often would a person be available to care for the patient? Whenever needed   During the past month, has the patient often been bothered by feeling down, depressed or hopeless? No   During the past month, has the patient often been bothered by little interest or pleasure in doing things? No

## 2017-11-17 NOTE — PROGRESS NOTES
Pt arrived to IR room 190 for L4-L5 disc aspiration, no acute distress noted. Orders, contrast and labs reviewed on chart.

## 2017-11-17 NOTE — PLAN OF CARE
Problem: Patient Care Overview  Goal: Plan of Care Review  Outcome: Ongoing (interventions implemented as appropriate)  Patient AAOx4, VSS, complained of pain frequently throughout shift. PRN pain meds given. Mild relief obtained. Pain management with repositioning and heat packs also provided. Patient ambulates frequently off the unit during the day. NPO since midnight. Family present at bedside. Bed low and locked, walker at bedside, gate steady with use of walker, call light and possessions within reach, remains free of falls, will continue to monitor.
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
## 2017-11-17 NOTE — PROGRESS NOTES
[facility] Hospital Medicine  Progress Note    Patient Name: Evgeny Wilde  MRN: [id number]  Patient Class: IP- Inpatient   Admission Date: 11/13/2017  Length of Stay: 4 days  Attending Physician: Davian Payne, *  Primary Care Provider: Primary Doctor [facility] Medicine Team: [facility] Rad Fall MD    Subjective:     Principal Problem:Acute bilateral low back pain with bilateral sciatica    HPI:  32 years old male with no significant past medical Hx, present to the ED with progressive lower back pain with sciatica for the last month. He works on constructions, pain start suddenly 1 month ago, at the lower back, dull, aggravated with movements. No inciting factors. Went to [facility] and [facility] ER, he had CT and MRI and they told him he had disk problem and he need neuro follow up and provided with muscle relaxant and narcotic for pain control. Patient stated the medicine is gone and the ain is worse with numbness in his left leg. He is not working for the last month. Denied any trauma, recent infection, surgery, foreign body, recent IV drug abuse. Also denied any fever, mekhi loss, fecal or urine incontinence, saddle anaesthesia.          Hospital Course:  11/14: NSGY following             Blood cultures NGTD, Urine cultures pending.              CT lumbar spine concerning for spondylodiscitis.  11/15: afebrile overnight, blood CX NGTD, NSGY stated no indications for NSGY interventions this admission, they want follow him up as outpatient after after Abx. We will pursue IR for disk Bx/aspirate for Dx.       11/16: NAEON. Vitals stable. Pending disc aspiration via IR tomorrow.    11/17: NAEON. Vitals stable. Planned for L4-L5 biopsy today.    Interval History: NAEON. Vitals stable.    Review of Systems   Constitutional: Negative for chills, fatigue and fever.   Respiratory: Negative for cough, chest tightness and shortness of breath.    Cardiovascular: Negative for  chest pain, palpitations and leg swelling.   Gastrointestinal: Negative for abdominal pain, diarrhea and vomiting.   Genitourinary: Negative for difficulty urinating.   Musculoskeletal: Positive for back pain.   Neurological: Positive for numbness. Negative for dizziness, weakness and light-headedness.   Psychiatric/Behavioral: Negative for agitation and confusion.     Objective:     Vital Signs (Most Recent):  Temp: 97.3 °F (36.3 °C) (11/17/17 1104)  Pulse: 86 (11/17/17 1104)  Resp: 16 (11/17/17 1104)  BP: 118/77 (11/17/17 1104)  SpO2: 99 % (11/17/17 1104) Vital Signs (24h Range):  Temp:  [97.3 °F (36.3 °C)-98.6 °F (37 °C)] 97.3 °F (36.3 °C)  Pulse:  [] 86  Resp:  [11-18] 16  SpO2:  [95 %-100 %] 99 %  BP: ()/(56-90) 118/77     Weight: 90.2 kg (198 lb 13.7 oz)  Body mass index is 25.53 kg/m².    Intake/Output Summary (Last 24 hours) at 11/17/17 1143  Last data filed at 11/16/17 1800   Gross per 24 hour   Intake              400 ml   Output                0 ml   Net              400 ml      Physical Exam   Constitutional: He is oriented to person, place, and time. No distress.   Eyes: Pupils are equal, round, and reactive to light.   Cardiovascular: Normal rate, regular rhythm and normal heart sounds.    Pulmonary/Chest: Effort normal and breath sounds normal.   Abdominal: Soft. Bowel sounds are normal.   Musculoskeletal: He exhibits no edema.   Neurological: He is alert and oriented to person, place, and time. A sensory deficit is present.   Bilateral sensory loss over L5 distribution.   Nursing note and vitals reviewed.      Significant Labs:   A1C:   Recent Labs  Lab 11/13/17  1841   HGBA1C 5.7*     ABGs: No results for input(s): PH, PCO2, HCO3, POCSATURATED, BE, TOTALHB, COHB, METHB, O2HB, POCFIO2 in the last 48 hours.  Bilirubin: No results for input(s): BILIDIR, BILIRUBINTOT, BILITOT in the last 720 hours.  Blood Culture: No results for input(s): LABBLOO in the last 48 hours.  BMP:   Recent  Labs  Lab 11/17/17  0559   GLU 88      K 4.5      CO2 29   BUN 10   CREATININE 0.9   CALCIUM 9.5   MG 2.2     CBC:   Recent Labs  Lab 11/16/17 0521 11/17/17  0559   WBC 4.50 4.37   HGB 11.2* 11.2*   HCT 35.8* 35.2*    305     CMP:   Recent Labs  Lab 11/16/17 0521 11/17/17  0559    139   K 4.0 4.5    104   CO2 27 29    88   BUN 11 10   CREATININE 0.9 0.9   CALCIUM 9.6 9.5   ANIONGAP 10 6*   EGFRNONAA >60.0 >60.0     Cardiac Markers: No results for input(s): CKMB, MYOGLOBIN, BNP, TROPISTAT in the last 48 hours.  Coagulation: No results for input(s): INR, APTT in the last 48 hours.    Invalid input(s): PT  Lactic Acid: No results for input(s): LACTATE in the last 48 hours.  Lipase: No results for input(s): LIPASE in the last 48 hours.  Lipid Panel: No results for input(s): CHOL, HDL, LDLCALC, TRIG, CHOLHDL in the last 48 hours.  Magnesium:   Recent Labs  Lab 11/16/17 0521 11/17/17 0559   MG 2.2 2.2     Pathology Results  (Last 10 years)    None        POCT Glucose: No results for input(s): POCTGLUCOSE in the last 48 hours.  Prealbumin: No results for input(s): PREALBUMIN in the last 48 hours.  Respiratory Culture: No results for input(s): GSRESP, RESPIRATORYC in the last 48 hours.  Troponin: No results for input(s): TROPONINI in the last 48 hours.  TSH:   Recent Labs  Lab 11/14/17  0412   TSH 1.664     Urine Culture: No results for input(s): LABURIN in the last 48 hours.  Urine Studies: No results for input(s): COLORU, APPEARANCEUA, PHUR, SPECGRAV, PROTEINUA, GLUCUA, KETONESU, BILIRUBINUA, OCCULTUA, NITRITE, UROBILINOGEN, LEUKOCYTESUR, RBCUA, WBCUA, BACTERIA, SQUAMEPITHEL, HYALINECASTS in the last 48 hours.    Invalid input(s): WRIGHTSUR  All pertinent labs within the past 24 hours have been reviewed.    Significant Imaging: I have reviewed all pertinent imaging results/findings within the past 24 hours.    Assessment/Plan:      * Acute bilateral low back pain with bilateral  sciatica    -  CT lumbar spine concerning for spondylodiscitis at L4-L5.   - NSGY consulted, appreciate recs.   - ESR, CRP mildly elevated.  - Procal normal  - Blood cultures NGTD   - Urine cultures NGTD  - PT/OT   - HIV Abs negative  - Norco and morphine for pain relief.  - S/p L4-L5 biopsy by IR . Sample sent for pathology. Results awaited.               Discitis of lumbar region    - NSGY consult, appreciate recs  *recommend IR aspiration of L4-L5 disc  Plan for follow up in clinic in 6 weeks with MRI w/wo contrast & infectious labs.  - S/p L4-L5 disc aspiration.          VTE Risk Mitigation         Ordered     Medium Risk of VTE  Once      11/13/17 1910              Rad Fall MD  Department of Hospital Medicine   [facility]

## 2017-11-17 NOTE — ASSESSMENT & PLAN NOTE
- NSGY consult, appreciate recs  *recommend IR aspiration of L4-L5 disc  Plan for follow up in clinic in 6 weeks with MRI w/wo contrast & infectious labs.  - S/p L4-L5 disc aspiration.

## 2017-11-18 PROBLEM — M54.42 ACUTE BILATERAL LOW BACK PAIN WITH BILATERAL SCIATICA: Status: ACTIVE | Noted: 2017-11-18

## 2017-11-18 PROBLEM — M54.41 ACUTE BILATERAL LOW BACK PAIN WITH BILATERAL SCIATICA: Status: ACTIVE | Noted: 2017-11-18

## 2017-11-18 LAB
ANION GAP SERPL CALC-SCNC: 11 MMOL/L
BACTERIA BLD CULT: NORMAL
BACTERIA BLD CULT: NORMAL
BASOPHILS # BLD AUTO: 0.02 K/UL
BASOPHILS NFR BLD: 0.4 %
BUN SERPL-MCNC: 11 MG/DL
CALCIUM SERPL-MCNC: 9.6 MG/DL
CHLORIDE SERPL-SCNC: 100 MMOL/L
CO2 SERPL-SCNC: 30 MMOL/L
CREAT SERPL-MCNC: 1 MG/DL
DIFFERENTIAL METHOD: ABNORMAL
EOSINOPHIL # BLD AUTO: 0.1 K/UL
EOSINOPHIL NFR BLD: 1.5 %
ERYTHROCYTE [DISTWIDTH] IN BLOOD BY AUTOMATED COUNT: 14 %
EST. GFR  (AFRICAN AMERICAN): >60 ML/MIN/1.73 M^2
EST. GFR  (NON AFRICAN AMERICAN): >60 ML/MIN/1.73 M^2
GLUCOSE SERPL-MCNC: 121 MG/DL
HCT VFR BLD AUTO: 38.5 %
HGB BLD-MCNC: 12.2 G/DL
IMM GRANULOCYTES # BLD AUTO: 0.01 K/UL
IMM GRANULOCYTES NFR BLD AUTO: 0.2 %
LYMPHOCYTES # BLD AUTO: 2.4 K/UL
LYMPHOCYTES NFR BLD: 53 %
MAGNESIUM SERPL-MCNC: 2.2 MG/DL
MCH RBC QN AUTO: 27.4 PG
MCHC RBC AUTO-ENTMCNC: 31.7 G/DL
MCV RBC AUTO: 86 FL
MONOCYTES # BLD AUTO: 0.4 K/UL
MONOCYTES NFR BLD: 9.7 %
NEUTROPHILS # BLD AUTO: 1.6 K/UL
NEUTROPHILS NFR BLD: 35.2 %
NRBC BLD-RTO: 0 /100 WBC
PHOSPHATE SERPL-MCNC: 3.7 MG/DL
PLATELET # BLD AUTO: 257 K/UL
PMV BLD AUTO: 10.2 FL
POTASSIUM SERPL-SCNC: 4 MMOL/L
RBC # BLD AUTO: 4.46 M/UL
SODIUM SERPL-SCNC: 141 MMOL/L
WBC # BLD AUTO: 4.53 K/UL

## 2017-11-18 PROCEDURE — 80048 BASIC METABOLIC PNL TOTAL CA: CPT

## 2017-11-18 PROCEDURE — 99233 SBSQ HOSP IP/OBS HIGH 50: CPT | Mod: ,,, | Performed by: HOSPITALIST

## 2017-11-18 PROCEDURE — 25000003 PHARM REV CODE 250: Performed by: STUDENT IN AN ORGANIZED HEALTH CARE EDUCATION/TRAINING PROGRAM

## 2017-11-18 PROCEDURE — 83735 ASSAY OF MAGNESIUM: CPT

## 2017-11-18 PROCEDURE — 85025 COMPLETE CBC W/AUTO DIFF WBC: CPT

## 2017-11-18 PROCEDURE — 63600175 PHARM REV CODE 636 W HCPCS: Performed by: STUDENT IN AN ORGANIZED HEALTH CARE EDUCATION/TRAINING PROGRAM

## 2017-11-18 PROCEDURE — 84100 ASSAY OF PHOSPHORUS: CPT

## 2017-11-18 PROCEDURE — 36415 COLL VENOUS BLD VENIPUNCTURE: CPT

## 2017-11-18 PROCEDURE — 20600001 HC STEP DOWN PRIVATE ROOM

## 2017-11-18 RX ORDER — IBUPROFEN 200 MG
200 TABLET ORAL EVERY 6 HOURS PRN
Status: DISCONTINUED | OUTPATIENT
Start: 2017-11-18 | End: 2017-11-19

## 2017-11-18 RX ORDER — GABAPENTIN 300 MG/1
600 CAPSULE ORAL 3 TIMES DAILY
Status: DISCONTINUED | OUTPATIENT
Start: 2017-11-18 | End: 2017-11-22

## 2017-11-18 RX ADMIN — MORPHINE SULFATE 4 MG: 2 INJECTION, SOLUTION INTRAMUSCULAR; INTRAVENOUS at 03:11

## 2017-11-18 RX ADMIN — STANDARDIZED SENNA CONCENTRATE AND DOCUSATE SODIUM 1 TABLET: 8.6; 5 TABLET, FILM COATED ORAL at 09:11

## 2017-11-18 RX ADMIN — GABAPENTIN 600 MG: 300 CAPSULE ORAL at 02:11

## 2017-11-18 RX ADMIN — CYCLOBENZAPRINE HYDROCHLORIDE 5 MG: 5 TABLET, FILM COATED ORAL at 05:11

## 2017-11-18 RX ADMIN — MORPHINE SULFATE 4 MG: 2 INJECTION, SOLUTION INTRAMUSCULAR; INTRAVENOUS at 02:11

## 2017-11-18 RX ADMIN — CYCLOBENZAPRINE HYDROCHLORIDE 5 MG: 5 TABLET, FILM COATED ORAL at 02:11

## 2017-11-18 RX ADMIN — GABAPENTIN 400 MG: 400 CAPSULE ORAL at 05:11

## 2017-11-18 RX ADMIN — CYCLOBENZAPRINE HYDROCHLORIDE 5 MG: 5 TABLET, FILM COATED ORAL at 09:11

## 2017-11-18 RX ADMIN — MORPHINE SULFATE 4 MG: 2 INJECTION, SOLUTION INTRAMUSCULAR; INTRAVENOUS at 09:11

## 2017-11-18 RX ADMIN — IBUPROFEN 200 MG: 200 TABLET, FILM COATED ORAL at 11:11

## 2017-11-18 RX ADMIN — HYDROCODONE BITARTRATE AND ACETAMINOPHEN 1 TABLET: 7.5; 325 TABLET ORAL at 07:11

## 2017-11-18 RX ADMIN — HYDROCODONE BITARTRATE AND ACETAMINOPHEN 1 TABLET: 7.5; 325 TABLET ORAL at 01:11

## 2017-11-18 RX ADMIN — GABAPENTIN 600 MG: 300 CAPSULE ORAL at 09:11

## 2017-11-18 RX ADMIN — HYDROCODONE BITARTRATE AND ACETAMINOPHEN 1 TABLET: 7.5; 325 TABLET ORAL at 05:11

## 2017-11-18 NOTE — PLAN OF CARE
"Problem: Patient Care Overview  Goal: Plan of Care Review  Outcome: Ongoing (interventions implemented as appropriate)  Pt AAOX4. AVSS. Pt remains free from falls, ambulates downstairs regularly. Pt c/o pain "12/10," pain medication given as ordered.  Pt safety maintained. Hourly rounding performed.       "

## 2017-11-18 NOTE — PLAN OF CARE
Problem: Patient Care Overview  Goal: Plan of Care Review  Outcome: Ongoing (interventions implemented as appropriate)  Pt AAOX4. AVSS. Pt c/o pain 9/10 in lower back and leg, controlled with PRN medications. Pt frequently ambulates downstairs. Pt remains free from falls. Pt safety maintained. Hourly rounding performed

## 2017-11-18 NOTE — PLAN OF CARE
Problem: Patient Care Overview  Goal: Plan of Care Review  Outcome: Ongoing (interventions implemented as appropriate)  Pt AAOX4. AVSS. Pt c/o pain 9/10 in lower back and leg, controlled with PRN medications. Pt frequently ambulates downstairs. Pt remains free from falls. Pt safety maintained. Hourly rounding performed.

## 2017-11-18 NOTE — SUBJECTIVE & OBJECTIVE
Interval History: NAEON. Vitals stable.    Review of Systems   Constitutional: Negative for chills and fever.   HENT: Negative for trouble swallowing.    Eyes: Negative for visual disturbance.   Respiratory: Negative for chest tightness and shortness of breath.    Cardiovascular: Negative for chest pain and palpitations.   Gastrointestinal: Negative for abdominal pain, diarrhea and vomiting.   Genitourinary: Negative for difficulty urinating.   Musculoskeletal: Positive for back pain.   Neurological: Negative for dizziness and light-headedness.   Psychiatric/Behavioral: Negative for agitation and confusion.     Objective:     Vital Signs (Most Recent):  Temp: 98 °F (36.7 °C) (11/17/17 2318)  Pulse: 108 (11/17/17 2318)  Resp: 18 (11/17/17 2318)  BP: 123/61 (11/17/17 2318)  SpO2: 98 % (11/17/17 2318) Vital Signs (24h Range):  Temp:  [97.3 °F (36.3 °C)-98.7 °F (37.1 °C)] 98 °F (36.7 °C)  Pulse:  [] 108  Resp:  [11-18] 18  SpO2:  [95 %-100 %] 98 %  BP: ()/(56-90) 123/61     Weight: 90.2 kg (198 lb 13.7 oz)  Body mass index is 25.53 kg/m².    Intake/Output Summary (Last 24 hours) at 11/18/17 0653  Last data filed at 11/18/17 0600   Gross per 24 hour   Intake              430 ml   Output                0 ml   Net              430 ml      Physical Exam   Constitutional: He is oriented to person, place, and time. No distress.   Eyes: Pupils are equal, round, and reactive to light.   Cardiovascular: Normal rate, regular rhythm, normal heart sounds and intact distal pulses.    Pulmonary/Chest: Effort normal and breath sounds normal.   Abdominal: Soft. Bowel sounds are normal.   Musculoskeletal: He exhibits no edema.   Neurological: He is alert and oriented to person, place, and time. A sensory deficit is present.   Decreased sensation along L5 distribution bilaterally.   Nursing note and vitals reviewed.      Significant Labs:   A1C:   Recent Labs  Lab 11/13/17  1841   HGBA1C 5.7*     ABGs: No results for  input(s): PH, PCO2, HCO3, POCSATURATED, BE, TOTALHB, COHB, METHB, O2HB, POCFIO2 in the last 48 hours.  Bilirubin: No results for input(s): BILIDIR, BILIRUBINTOT, BILITOT in the last 720 hours.  Blood Culture: No results for input(s): LABBLOO in the last 48 hours.  BMP:   Recent Labs  Lab 11/17/17  0559   GLU 88      K 4.5      CO2 29   BUN 10   CREATININE 0.9   CALCIUM 9.5   MG 2.2     CBC:   Recent Labs  Lab 11/17/17  0559 11/18/17  0525   WBC 4.37 4.53   HGB 11.2* 12.2*   HCT 35.2* 38.5*    257     CMP:   Recent Labs  Lab 11/17/17  0559      K 4.5      CO2 29   GLU 88   BUN 10   CREATININE 0.9   CALCIUM 9.5   ANIONGAP 6*   EGFRNONAA >60.0     Cardiac Markers: No results for input(s): CKMB, MYOGLOBIN, BNP, TROPISTAT in the last 48 hours.  Coagulation: No results for input(s): INR, APTT in the last 48 hours.    Invalid input(s): PT  Lactic Acid: No results for input(s): LACTATE in the last 48 hours.  Lipase: No results for input(s): LIPASE in the last 48 hours.  Lipid Panel: No results for input(s): CHOL, HDL, LDLCALC, TRIG, CHOLHDL in the last 48 hours.  Magnesium:   Recent Labs  Lab 11/17/17  0559   MG 2.2     Pathology Results  (Last 10 years)    None        POCT Glucose: No results for input(s): POCTGLUCOSE in the last 48 hours.  Prealbumin: No results for input(s): PREALBUMIN in the last 48 hours.  Respiratory Culture: No results for input(s): GSRESP, RESPIRATORYC in the last 48 hours.  Troponin: No results for input(s): TROPONINI in the last 48 hours.  TSH:   Recent Labs  Lab 11/14/17  0412   TSH 1.664     Urine Culture: No results for input(s): LABURIN in the last 48 hours.  Urine Studies: No results for input(s): COLORU, APPEARANCEUA, PHUR, SPECGRAV, PROTEINUA, GLUCUA, KETONESU, BILIRUBINUA, OCCULTUA, NITRITE, UROBILINOGEN, LEUKOCYTESUR, RBCUA, WBCUA, BACTERIA, SQUAMEPITHEL, HYALINECASTS in the last 48 hours.    Invalid input(s): WRIGHTSUR  All pertinent labs within the past 24  hours have been reviewed.    Significant Imaging: I have reviewed all pertinent imaging results/findings within the past 24 hours.

## 2017-11-18 NOTE — ASSESSMENT & PLAN NOTE
-  CT lumbar spine concerning for spondylodiscitis at L4-L5.   - NSGY consulted, appreciate recs.   - ESR, CRP mildly elevated.  - Procal normal  - Blood cultures NGTD   - Urine cultures NGTD  - HIV Abs negative  - S/p L4-L5 biopsy by IR . Cultures, pathology report pending  - Norco and morphine for pain relief.  - Flexeril 5mg TID PRN  - PT/OT

## 2017-11-18 NOTE — PLAN OF CARE
Problem: Patient Care Overview  Goal: Plan of Care Review  Pt AAO x 4 & ambulatory. Pt c/o lower back pain radiating down RLE. Pt given IV morphine, NORCO, & flexeril. Pt receives pain relief temporarily. Pt remains free from falls. Yellow socks & fall risk band on. Pt w/out any complaints or concerns. Spouse at bedside.

## 2017-11-18 NOTE — PROGRESS NOTES
[facility] Hospital Medicine  Progress Note    Patient Name: Evgeny Wilde  MRN: [id number]  Patient Class: IP- Inpatient   Admission Date: 11/13/2017  Length of Stay: 5 days  Attending Physician: Davian Payne, *  Primary Care Provider: Primary Doctor [facility] Medicine Team: [facility] Rad Fall MD    Subjective:     Principal Problem:Acute bilateral low back pain with bilateral sciatica    HPI:  32 years old male with no significant past medical Hx, present to the ED with progressive lower back pain with sciatica for the last month. He works on constructions, pain start suddenly 1 month ago, at the lower back, dull, aggravated with movements. No inciting factors. Went to [facility] and [facility] ER, he had CT and MRI and they told him he had disk problem and he need neuro follow up and provided with muscle relaxant and narcotic for pain control. Patient stated the medicine is gone and the ain is worse with numbness in his left leg. He is not working for the last month. Denied any trauma, recent infection, surgery, foreign body, recent IV drug abuse. Also denied any fever, mekhi loss, fecal or urine incontinence, saddle anaesthesia.          Hospital Course:  11/14: NSGY following             Blood cultures NGTD, Urine cultures pending.              CT lumbar spine concerning for spondylodiscitis.  11/15: afebrile overnight, blood CX NGTD, NSGY stated no indications for NSGY interventions this admission, they want follow him up as outpatient after after Abx. We will pursue IR for disk Bx/aspirate for Dx.       11/16: NAEON. Vitals stable. Pending disc aspiration via IR tomorrow.    11/17: NAEON. Vitals stable. Planned for L4-L5 biopsy today.    11/18: NAEON. Vitals stable. S/p L4-L5 disc aspiration. Cultures, pathology report pending.    Interval History: NAEON. Vitals stable.    Review of Systems   Constitutional: Negative for chills and fever.   HENT: Negative for  trouble swallowing.    Eyes: Negative for visual disturbance.   Respiratory: Negative for chest tightness and shortness of breath.    Cardiovascular: Negative for chest pain and palpitations.   Gastrointestinal: Negative for abdominal pain, diarrhea and vomiting.   Genitourinary: Negative for difficulty urinating.   Musculoskeletal: Positive for back pain.   Neurological: Negative for dizziness and light-headedness.   Psychiatric/Behavioral: Negative for agitation and confusion.     Objective:     Vital Signs (Most Recent):  Temp: 98 °F (36.7 °C) (11/17/17 2318)  Pulse: 108 (11/17/17 2318)  Resp: 18 (11/17/17 2318)  BP: 123/61 (11/17/17 2318)  SpO2: 98 % (11/17/17 2318) Vital Signs (24h Range):  Temp:  [97.3 °F (36.3 °C)-98.7 °F (37.1 °C)] 98 °F (36.7 °C)  Pulse:  [] 108  Resp:  [11-18] 18  SpO2:  [95 %-100 %] 98 %  BP: ()/(56-90) 123/61     Weight: 90.2 kg (198 lb 13.7 oz)  Body mass index is 25.53 kg/m².    Intake/Output Summary (Last 24 hours) at 11/18/17 0653  Last data filed at 11/18/17 0600   Gross per 24 hour   Intake              430 ml   Output                0 ml   Net              430 ml      Physical Exam   Constitutional: He is oriented to person, place, and time. No distress.   Eyes: Pupils are equal, round, and reactive to light.   Cardiovascular: Normal rate, regular rhythm, normal heart sounds and intact distal pulses.    Pulmonary/Chest: Effort normal and breath sounds normal.   Abdominal: Soft. Bowel sounds are normal.   Musculoskeletal: He exhibits no edema.   Neurological: He is alert and oriented to person, place, and time. A sensory deficit is present.   Decreased sensation along L5 distribution bilaterally.   Nursing note and vitals reviewed.      Significant Labs:   A1C:   Recent Labs  Lab 11/13/17  1841   HGBA1C 5.7*     ABGs: No results for input(s): PH, PCO2, HCO3, POCSATURATED, BE, TOTALHB, COHB, METHB, O2HB, POCFIO2 in the last 48 hours.  Bilirubin: No results for input(s):  BILIDIR, BILIRUBINTOT, BILITOT in the last 720 hours.  Blood Culture: No results for input(s): LABBLOO in the last 48 hours.  BMP:   Recent Labs  Lab 11/17/17  0559   GLU 88      K 4.5      CO2 29   BUN 10   CREATININE 0.9   CALCIUM 9.5   MG 2.2     CBC:   Recent Labs  Lab 11/17/17  0559 11/18/17  0525   WBC 4.37 4.53   HGB 11.2* 12.2*   HCT 35.2* 38.5*    257     CMP:   Recent Labs  Lab 11/17/17  0559      K 4.5      CO2 29   GLU 88   BUN 10   CREATININE 0.9   CALCIUM 9.5   ANIONGAP 6*   EGFRNONAA >60.0     Cardiac Markers: No results for input(s): CKMB, MYOGLOBIN, BNP, TROPISTAT in the last 48 hours.  Coagulation: No results for input(s): INR, APTT in the last 48 hours.    Invalid input(s): PT  Lactic Acid: No results for input(s): LACTATE in the last 48 hours.  Lipase: No results for input(s): LIPASE in the last 48 hours.  Lipid Panel: No results for input(s): CHOL, HDL, LDLCALC, TRIG, CHOLHDL in the last 48 hours.  Magnesium:   Recent Labs  Lab 11/17/17  0559   MG 2.2     Pathology Results  (Last 10 years)    None        POCT Glucose: No results for input(s): POCTGLUCOSE in the last 48 hours.  Prealbumin: No results for input(s): PREALBUMIN in the last 48 hours.  Respiratory Culture: No results for input(s): GSRESP, RESPIRATORYC in the last 48 hours.  Troponin: No results for input(s): TROPONINI in the last 48 hours.  TSH:   Recent Labs  Lab 11/14/17  0412   TSH 1.664     Urine Culture: No results for input(s): LABURIN in the last 48 hours.  Urine Studies: No results for input(s): COLORU, APPEARANCEUA, PHUR, SPECGRAV, PROTEINUA, GLUCUA, KETONESU, BILIRUBINUA, OCCULTUA, NITRITE, UROBILINOGEN, LEUKOCYTESUR, RBCUA, WBCUA, BACTERIA, SQUAMEPITHEL, HYALINECASTS in the last 48 hours.    Invalid input(s): WRIGHTSUR  All pertinent labs within the past 24 hours have been reviewed.    Significant Imaging: I have reviewed all pertinent imaging results/findings within the past 24  hours.    Assessment/Plan:      * Acute bilateral low back pain with bilateral sciatica    -  CT lumbar spine concerning for spondylodiscitis at L4-L5.   - NSGY consulted, appreciate recs.   - ESR, CRP mildly elevated.  - Procal normal  - Blood cultures NGTD   - Urine cultures NGTD  - HIV Abs negative  - S/p L4-L5 biopsy by IR . Cultures, pathology report pending  - Norco and morphine for pain relief.  - Flexeril 5mg TID PRN  - PT/OT                Discitis of lumbar region    - NSGY consult, appreciate recs  *recommend IR aspiration of L4-L5 disc  Plan for follow up in clinic in 6 weeks with MRI w/wo contrast & infectious labs.  - S/p L4-L5 disc aspiration.          VTE Risk Mitigation         Ordered     Medium Risk of VTE  Once      11/13/17 1910              Rad Fall MD  Department of Hospital Medicine   [facility]

## 2017-11-19 LAB
ANION GAP SERPL CALC-SCNC: 6 MMOL/L
BASOPHILS # BLD AUTO: 0.01 K/UL
BASOPHILS NFR BLD: 0.3 %
BUN SERPL-MCNC: 14 MG/DL
CALCIUM SERPL-MCNC: 9.2 MG/DL
CHLORIDE SERPL-SCNC: 104 MMOL/L
CO2 SERPL-SCNC: 28 MMOL/L
CREAT SERPL-MCNC: 0.8 MG/DL
DIFFERENTIAL METHOD: ABNORMAL
EOSINOPHIL # BLD AUTO: 0 K/UL
EOSINOPHIL NFR BLD: 1.1 %
ERYTHROCYTE [DISTWIDTH] IN BLOOD BY AUTOMATED COUNT: 14 %
EST. GFR  (AFRICAN AMERICAN): >60 ML/MIN/1.73 M^2
EST. GFR  (NON AFRICAN AMERICAN): >60 ML/MIN/1.73 M^2
GLUCOSE SERPL-MCNC: 85 MG/DL
HCT VFR BLD AUTO: 33 %
HGB BLD-MCNC: 10.3 G/DL
IMM GRANULOCYTES # BLD AUTO: 0 K/UL
IMM GRANULOCYTES NFR BLD AUTO: 0 %
LYMPHOCYTES # BLD AUTO: 1.9 K/UL
LYMPHOCYTES NFR BLD: 51.1 %
MAGNESIUM SERPL-MCNC: 2 MG/DL
MCH RBC QN AUTO: 26.3 PG
MCHC RBC AUTO-ENTMCNC: 31.2 G/DL
MCV RBC AUTO: 84 FL
MONOCYTES # BLD AUTO: 0.5 K/UL
MONOCYTES NFR BLD: 14.5 %
NEUTROPHILS # BLD AUTO: 1.2 K/UL
NEUTROPHILS NFR BLD: 33 %
NRBC BLD-RTO: 0 /100 WBC
PHOSPHATE SERPL-MCNC: 3.4 MG/DL
PLATELET # BLD AUTO: 268 K/UL
PMV BLD AUTO: 9.4 FL
POTASSIUM SERPL-SCNC: 4.3 MMOL/L
RBC # BLD AUTO: 3.91 M/UL
SODIUM SERPL-SCNC: 138 MMOL/L
WBC # BLD AUTO: 3.66 K/UL

## 2017-11-19 PROCEDURE — 99499 UNLISTED E&M SERVICE: CPT | Mod: ,,, | Performed by: PHYSICIAN ASSISTANT

## 2017-11-19 PROCEDURE — 25000003 PHARM REV CODE 250: Performed by: STUDENT IN AN ORGANIZED HEALTH CARE EDUCATION/TRAINING PROGRAM

## 2017-11-19 PROCEDURE — 63600175 PHARM REV CODE 636 W HCPCS: Performed by: STUDENT IN AN ORGANIZED HEALTH CARE EDUCATION/TRAINING PROGRAM

## 2017-11-19 PROCEDURE — 99233 SBSQ HOSP IP/OBS HIGH 50: CPT | Mod: ,,, | Performed by: HOSPITALIST

## 2017-11-19 PROCEDURE — 83735 ASSAY OF MAGNESIUM: CPT

## 2017-11-19 PROCEDURE — 85025 COMPLETE CBC W/AUTO DIFF WBC: CPT

## 2017-11-19 PROCEDURE — 36415 COLL VENOUS BLD VENIPUNCTURE: CPT

## 2017-11-19 PROCEDURE — 80048 BASIC METABOLIC PNL TOTAL CA: CPT

## 2017-11-19 PROCEDURE — 84100 ASSAY OF PHOSPHORUS: CPT

## 2017-11-19 PROCEDURE — 20600001 HC STEP DOWN PRIVATE ROOM

## 2017-11-19 RX ORDER — MORPHINE SULFATE 2 MG/ML
4 INJECTION, SOLUTION INTRAMUSCULAR; INTRAVENOUS EVERY 8 HOURS PRN
Status: DISCONTINUED | OUTPATIENT
Start: 2017-11-19 | End: 2017-11-21

## 2017-11-19 RX ORDER — IBUPROFEN 400 MG/1
400 TABLET ORAL EVERY 6 HOURS PRN
Status: DISCONTINUED | OUTPATIENT
Start: 2017-11-19 | End: 2017-11-22

## 2017-11-19 RX ADMIN — MORPHINE SULFATE 4 MG: 2 INJECTION, SOLUTION INTRAMUSCULAR; INTRAVENOUS at 04:11

## 2017-11-19 RX ADMIN — GABAPENTIN 600 MG: 300 CAPSULE ORAL at 08:11

## 2017-11-19 RX ADMIN — CYCLOBENZAPRINE HYDROCHLORIDE 5 MG: 5 TABLET, FILM COATED ORAL at 02:11

## 2017-11-19 RX ADMIN — HYDROCODONE BITARTRATE AND ACETAMINOPHEN 1 TABLET: 7.5; 325 TABLET ORAL at 10:11

## 2017-11-19 RX ADMIN — HYDROCODONE BITARTRATE AND ACETAMINOPHEN 1 TABLET: 7.5; 325 TABLET ORAL at 06:11

## 2017-11-19 RX ADMIN — STANDARDIZED SENNA CONCENTRATE AND DOCUSATE SODIUM 1 TABLET: 8.6; 5 TABLET, FILM COATED ORAL at 10:11

## 2017-11-19 RX ADMIN — CYCLOBENZAPRINE HYDROCHLORIDE 5 MG: 5 TABLET, FILM COATED ORAL at 06:11

## 2017-11-19 RX ADMIN — IBUPROFEN 200 MG: 200 TABLET, FILM COATED ORAL at 06:11

## 2017-11-19 RX ADMIN — MORPHINE SULFATE 4 MG: 2 INJECTION, SOLUTION INTRAMUSCULAR; INTRAVENOUS at 08:11

## 2017-11-19 RX ADMIN — GABAPENTIN 600 MG: 300 CAPSULE ORAL at 06:11

## 2017-11-19 RX ADMIN — MORPHINE SULFATE 4 MG: 2 INJECTION, SOLUTION INTRAMUSCULAR; INTRAVENOUS at 11:11

## 2017-11-19 RX ADMIN — HYDROCODONE BITARTRATE AND ACETAMINOPHEN 1 TABLET: 7.5; 325 TABLET ORAL at 01:11

## 2017-11-19 RX ADMIN — GABAPENTIN 600 MG: 300 CAPSULE ORAL at 02:11

## 2017-11-19 RX ADMIN — STANDARDIZED SENNA CONCENTRATE AND DOCUSATE SODIUM 1 TABLET: 8.6; 5 TABLET, FILM COATED ORAL at 08:11

## 2017-11-19 NOTE — SUBJECTIVE & OBJECTIVE
Interval History: NAEON. Vitals stable.    Review of Systems   Constitutional: Negative for chills and fever.   Respiratory: Negative for cough and shortness of breath.    Cardiovascular: Negative for chest pain and palpitations.   Gastrointestinal: Negative for abdominal pain, constipation, diarrhea, nausea and vomiting.   Genitourinary: Negative for difficulty urinating.   Musculoskeletal: Positive for back pain.   Neurological: Negative for dizziness and light-headedness.   Psychiatric/Behavioral: Negative for agitation and confusion.     Objective:     Vital Signs (Most Recent):  Temp: 97.8 °F (36.6 °C) (11/19/17 1203)  Pulse: 85 (11/19/17 1203)  Resp: 16 (11/19/17 1203)  BP: 126/77 (11/19/17 1203)  SpO2: 100 % (11/19/17 1203) Vital Signs (24h Range):  Temp:  [97.3 °F (36.3 °C)-99.2 °F (37.3 °C)] 97.8 °F (36.6 °C)  Pulse:  [] 85  Resp:  [16-18] 16  SpO2:  [97 %-100 %] 100 %  BP: (119-128)/(66-79) 126/77     Weight: 90.2 kg (198 lb 13.7 oz)  Body mass index is 25.53 kg/m².    Intake/Output Summary (Last 24 hours) at 11/19/17 1400  Last data filed at 11/19/17 1000   Gross per 24 hour   Intake              800 ml   Output              325 ml   Net              475 ml      Physical Exam   Constitutional: He is oriented to person, place, and time.   Cardiovascular: Normal rate and regular rhythm.    Pulmonary/Chest: Effort normal and breath sounds normal.   Abdominal: Soft. Bowel sounds are normal.   Musculoskeletal: He exhibits no edema.   Neurological: He is alert and oriented to person, place, and time.       Significant Labs:   A1C:   Recent Labs  Lab 11/13/17  1841   HGBA1C 5.7*     ABGs: No results for input(s): PH, PCO2, HCO3, POCSATURATED, BE, TOTALHB, COHB, METHB, O2HB, POCFIO2 in the last 48 hours.  Bilirubin: No results for input(s): BILIDIR, BILIRUBINTOT, BILITOT in the last 720 hours.  Blood Culture: No results for input(s): LABBLOO in the last 48 hours.  BMP:   Recent Labs  Lab 11/19/17  0414    GLU 85      K 4.3      CO2 28   BUN 14   CREATININE 0.8   CALCIUM 9.2   MG 2.0     CBC:   Recent Labs  Lab 11/18/17 0525 11/19/17 0417   WBC 4.53 3.66*   HGB 12.2* 10.3*   HCT 38.5* 33.0*    268     CMP:   Recent Labs  Lab 11/18/17 0525 11/19/17 0417    138   K 4.0 4.3    104   CO2 30* 28   * 85   BUN 11 14   CREATININE 1.0 0.8   CALCIUM 9.6 9.2   ANIONGAP 11 6*   EGFRNONAA >60.0 >60.0     Cardiac Markers: No results for input(s): CKMB, MYOGLOBIN, BNP, TROPISTAT in the last 48 hours.  Coagulation: No results for input(s): INR, APTT in the last 48 hours.    Invalid input(s): PT  Lactic Acid: No results for input(s): LACTATE in the last 48 hours.  Lipase: No results for input(s): LIPASE in the last 48 hours.  Lipid Panel: No results for input(s): CHOL, HDL, LDLCALC, TRIG, CHOLHDL in the last 48 hours.  Magnesium:   Recent Labs  Lab 11/18/17 0525 11/19/17 0417   MG 2.2 2.0     Pathology Results  (Last 10 years)    None        POCT Glucose: No results for input(s): POCTGLUCOSE in the last 48 hours.  Prealbumin: No results for input(s): PREALBUMIN in the last 48 hours.  Respiratory Culture: No results for input(s): GSRESP, RESPIRATORYC in the last 48 hours.  Troponin: No results for input(s): TROPONINI in the last 48 hours.  TSH:   Recent Labs  Lab 11/14/17 0412   TSH 1.664     Urine Culture: No results for input(s): LABURIN in the last 48 hours.  Urine Studies: No results for input(s): COLORU, APPEARANCEUA, PHUR, SPECGRAV, PROTEINUA, GLUCUA, KETONESU, BILIRUBINUA, OCCULTUA, NITRITE, UROBILINOGEN, LEUKOCYTESUR, RBCUA, WBCUA, BACTERIA, SQUAMEPITHEL, HYALINECASTS in the last 48 hours.    Invalid input(s): WRIGHTSUR  All pertinent labs within the past 24 hours have been reviewed.    Significant Imaging: I have reviewed all pertinent imaging results/findings within the past 24 hours.

## 2017-11-19 NOTE — PROGRESS NOTES
[facility] Hospital Medicine  Progress Note    Patient Name: Evgeny Wilde  MRN: [id number]  Patient Class: IP- Inpatient   Admission Date: 11/13/2017  Length of Stay: 6 days  Attending Physician: Davian Payne, *  Primary Care Provider: Primary Doctor [facility] Medicine Team: [facility] Rad Fall MD    Subjective:     Principal Problem:Acute bilateral low back pain with bilateral sciatica    HPI:  32 years old male with no significant past medical Hx, present to the ED with progressive lower back pain with sciatica for the last month. He works on constructions, pain start suddenly 1 month ago, at the lower back, dull, aggravated with movements. No inciting factors. Went to [facility] and [facility] ER, he had CT and MRI and they told him he had disk problem and he need neuro follow up and provided with muscle relaxant and narcotic for pain control. Patient stated the medicine is gone and the ain is worse with numbness in his left leg. He is not working for the last month. Denied any trauma, recent infection, surgery, foreign body, recent IV drug abuse. Also denied any fever, mekhi loss, fecal or urine incontinence, saddle anaesthesia.          Hospital Course:  11/14: NSGY following             Blood cultures NGTD, Urine cultures pending.              CT lumbar spine concerning for spondylodiscitis.  11/15: afebrile overnight, blood CX NGTD, NSGY stated no indications for NSGY interventions this admission, they want follow him up as outpatient after after Abx. We will pursue IR for disk Bx/aspirate for Dx.       11/16: NAEON. Vitals stable. Pending disc aspiration via IR tomorrow.    11/17: NAEON. Vitals stable. Planned for L4-L5 biopsy today.    11/18: NAEON. Vitals stable. S/p L4-L5 disc aspiration. Cultures, pathology report pending.    11/19: NAEON. Vitals stable.S/p L4-L5 disc aspiration. Cultures, pathology report pending.        Interval History: NAEON. Vitals  stable.    Review of Systems   Constitutional: Negative for chills and fever.   Respiratory: Negative for cough and shortness of breath.    Cardiovascular: Negative for chest pain and palpitations.   Gastrointestinal: Negative for abdominal pain, constipation, diarrhea, nausea and vomiting.   Genitourinary: Negative for difficulty urinating.   Musculoskeletal: Positive for back pain.   Neurological: Negative for dizziness and light-headedness.   Psychiatric/Behavioral: Negative for agitation and confusion.     Objective:     Vital Signs (Most Recent):  Temp: 97.8 °F (36.6 °C) (11/19/17 1203)  Pulse: 85 (11/19/17 1203)  Resp: 16 (11/19/17 1203)  BP: 126/77 (11/19/17 1203)  SpO2: 100 % (11/19/17 1203) Vital Signs (24h Range):  Temp:  [97.3 °F (36.3 °C)-99.2 °F (37.3 °C)] 97.8 °F (36.6 °C)  Pulse:  [] 85  Resp:  [16-18] 16  SpO2:  [97 %-100 %] 100 %  BP: (119-128)/(66-79) 126/77     Weight: 90.2 kg (198 lb 13.7 oz)  Body mass index is 25.53 kg/m².    Intake/Output Summary (Last 24 hours) at 11/19/17 1400  Last data filed at 11/19/17 1000   Gross per 24 hour   Intake              800 ml   Output              325 ml   Net              475 ml      Physical Exam   Constitutional: He is oriented to person, place, and time.   Cardiovascular: Normal rate and regular rhythm.    Pulmonary/Chest: Effort normal and breath sounds normal.   Abdominal: Soft. Bowel sounds are normal.   Musculoskeletal: He exhibits no edema.   Neurological: He is alert and oriented to person, place, and time.       Significant Labs:   A1C:   Recent Labs  Lab 11/13/17  1841   HGBA1C 5.7*     ABGs: No results for input(s): PH, PCO2, HCO3, POCSATURATED, BE, TOTALHB, COHB, METHB, O2HB, POCFIO2 in the last 48 hours.  Bilirubin: No results for input(s): BILIDIR, BILIRUBINTOT, BILITOT in the last 720 hours.  Blood Culture: No results for input(s): LABBLOO in the last 48 hours.  BMP:   Recent Labs  Lab 11/19/17  0417   GLU 85      K 4.3       CO2 28   BUN 14   CREATININE 0.8   CALCIUM 9.2   MG 2.0     CBC:   Recent Labs  Lab 11/18/17 0525 11/19/17 0417   WBC 4.53 3.66*   HGB 12.2* 10.3*   HCT 38.5* 33.0*    268     CMP:   Recent Labs  Lab 11/18/17 0525 11/19/17 0417    138   K 4.0 4.3    104   CO2 30* 28   * 85   BUN 11 14   CREATININE 1.0 0.8   CALCIUM 9.6 9.2   ANIONGAP 11 6*   EGFRNONAA >60.0 >60.0     Cardiac Markers: No results for input(s): CKMB, MYOGLOBIN, BNP, TROPISTAT in the last 48 hours.  Coagulation: No results for input(s): INR, APTT in the last 48 hours.    Invalid input(s): PT  Lactic Acid: No results for input(s): LACTATE in the last 48 hours.  Lipase: No results for input(s): LIPASE in the last 48 hours.  Lipid Panel: No results for input(s): CHOL, HDL, LDLCALC, TRIG, CHOLHDL in the last 48 hours.  Magnesium:   Recent Labs  Lab 11/18/17 0525 11/19/17 0417   MG 2.2 2.0     Pathology Results  (Last 10 years)    None        POCT Glucose: No results for input(s): POCTGLUCOSE in the last 48 hours.  Prealbumin: No results for input(s): PREALBUMIN in the last 48 hours.  Respiratory Culture: No results for input(s): GSRESP, RESPIRATORYC in the last 48 hours.  Troponin: No results for input(s): TROPONINI in the last 48 hours.  TSH:   Recent Labs  Lab 11/14/17 0412   TSH 1.664     Urine Culture: No results for input(s): LABURIN in the last 48 hours.  Urine Studies: No results for input(s): COLORU, APPEARANCEUA, PHUR, SPECGRAV, PROTEINUA, GLUCUA, KETONESU, BILIRUBINUA, OCCULTUA, NITRITE, UROBILINOGEN, LEUKOCYTESUR, RBCUA, WBCUA, BACTERIA, SQUAMEPITHEL, HYALINECASTS in the last 48 hours.    Invalid input(s): WRIGHTSUR  All pertinent labs within the past 24 hours have been reviewed.    Significant Imaging: I have reviewed all pertinent imaging results/findings within the past 24 hours.    Assessment/Plan:      * Acute bilateral low back pain with bilateral sciatica    -  CT lumbar spine concerning for  spondylodiscitis at L4-L5.   - NSGY consulted, appreciate recs.   - ESR, CRP mildly elevated.  - Procal normal  - Blood cultures NGTD   - Urine cultures NGTD  - HIV Abs negative  - S/p L4-L5 biopsy by IR . Cultures negative, pathology report awaited.  - Norco and morphine for pain relief.  - Flexeril 5mg TID PRN  - PT/OT   Patient still in severe pain. Will talk to NSGY regarding any surgical intervention.               Discitis of lumbar region    - NSGY consult, appreciate recs  *recommend IR aspiration of L4-L5 disc  Plan for follow up in clinic in 6 weeks with MRI w/wo contrast & infectious labs.  - S/p L4-L5 disc aspiration.          VTE Risk Mitigation         Ordered     Medium Risk of VTE  Once      11/13/17 1910              Rad Fall MD  Department of Hospital Medicine   [facility] [address] [facility]

## 2017-11-19 NOTE — ASSESSMENT & PLAN NOTE
-  CT lumbar spine concerning for spondylodiscitis at L4-L5.   - NSGY consulted, appreciate recs.   - ESR, CRP mildly elevated.  - Procal normal  - Blood cultures NGTD   - Urine cultures NGTD  - HIV Abs negative  - S/p L4-L5 biopsy by IR . Cultures negative, pathology report awaited.  - Norco and morphine for pain relief.  - Flexeril 5mg TID PRN  - PT/OT   Patient still in severe pain. Will talk to NSGY regarding any surgical intervention.

## 2017-11-19 NOTE — PLAN OF CARE
Problem: Patient Care Overview  Goal: Plan of Care Review  No acute changes overnight. Pt AAOx4. Ambulatory and independent. PRN medications administered around the clock for back pain and muscle spasms. Pt c/o severe muscle spasms mid shift. PRN ibuprofen administered with little to no relief. Pt states that morphine brings pain from a 10 to a 5 but only lasts a very short time. Heat packs applied to back. No other issues noted at this time. Will continue to monitor.

## 2017-11-19 NOTE — CONSULTS
[facility]  Infectious Disease  Consult Note    Patient Name: Evgeny Wilde  MRN: [id number]  Admission Date: 11/13/2017  Hospital Length of Stay: 6 days  Attending Physician: Davian Payne, *  Primary Care Provider: Primary Doctor No         Inpatient consult to Infectious Diseases  Consult performed by: LENNY MONTANEZ JR  Consult ordered by: MONY DUFF      Consult received.  Full consult to follow.      BOB Rangel  Infectious Disease  [facility]

## 2017-11-20 PROBLEM — R73.9 HYPERGLYCEMIA: Status: ACTIVE | Noted: 2017-11-20

## 2017-11-20 LAB
ANION GAP SERPL CALC-SCNC: 10 MMOL/L
BACTERIA SPEC AEROBE CULT: NO GROWTH
BASOPHILS # BLD AUTO: 0.02 K/UL
BASOPHILS NFR BLD: 0.5 %
BUN SERPL-MCNC: 13 MG/DL
CALCIUM SERPL-MCNC: 9.2 MG/DL
CHLORIDE SERPL-SCNC: 105 MMOL/L
CO2 SERPL-SCNC: 26 MMOL/L
CREAT SERPL-MCNC: 0.9 MG/DL
DIFFERENTIAL METHOD: ABNORMAL
EOSINOPHIL # BLD AUTO: 0.1 K/UL
EOSINOPHIL NFR BLD: 1.6 %
ERYTHROCYTE [DISTWIDTH] IN BLOOD BY AUTOMATED COUNT: 13.7 %
EST. GFR  (AFRICAN AMERICAN): >60 ML/MIN/1.73 M^2
EST. GFR  (NON AFRICAN AMERICAN): >60 ML/MIN/1.73 M^2
GLUCOSE SERPL-MCNC: 137 MG/DL
HCT VFR BLD AUTO: 33.7 %
HGB BLD-MCNC: 10.7 G/DL
IMM GRANULOCYTES # BLD AUTO: 0.01 K/UL
IMM GRANULOCYTES NFR BLD AUTO: 0.2 %
LYMPHOCYTES # BLD AUTO: 2.1 K/UL
LYMPHOCYTES NFR BLD: 47.5 %
MAGNESIUM SERPL-MCNC: 1.7 MG/DL
MCH RBC QN AUTO: 26.6 PG
MCHC RBC AUTO-ENTMCNC: 31.8 G/DL
MCV RBC AUTO: 84 FL
MONOCYTES # BLD AUTO: 0.5 K/UL
MONOCYTES NFR BLD: 10.6 %
NEUTROPHILS # BLD AUTO: 1.7 K/UL
NEUTROPHILS NFR BLD: 39.6 %
NRBC BLD-RTO: 0 /100 WBC
PHOSPHATE SERPL-MCNC: 3.1 MG/DL
PLATELET # BLD AUTO: 268 K/UL
PMV BLD AUTO: 9.8 FL
POTASSIUM SERPL-SCNC: 4 MMOL/L
RBC # BLD AUTO: 4.03 M/UL
SODIUM SERPL-SCNC: 141 MMOL/L
WBC # BLD AUTO: 4.36 K/UL

## 2017-11-20 PROCEDURE — 85025 COMPLETE CBC W/AUTO DIFF WBC: CPT

## 2017-11-20 PROCEDURE — 25000003 PHARM REV CODE 250: Performed by: STUDENT IN AN ORGANIZED HEALTH CARE EDUCATION/TRAINING PROGRAM

## 2017-11-20 PROCEDURE — 20600001 HC STEP DOWN PRIVATE ROOM

## 2017-11-20 PROCEDURE — 84100 ASSAY OF PHOSPHORUS: CPT

## 2017-11-20 PROCEDURE — 63600175 PHARM REV CODE 636 W HCPCS: Performed by: STUDENT IN AN ORGANIZED HEALTH CARE EDUCATION/TRAINING PROGRAM

## 2017-11-20 PROCEDURE — 80048 BASIC METABOLIC PNL TOTAL CA: CPT

## 2017-11-20 PROCEDURE — 99233 SBSQ HOSP IP/OBS HIGH 50: CPT | Mod: ,,, | Performed by: HOSPITALIST

## 2017-11-20 PROCEDURE — 63600175 PHARM REV CODE 636 W HCPCS: Performed by: INTERNAL MEDICINE

## 2017-11-20 PROCEDURE — 97535 SELF CARE MNGMENT TRAINING: CPT

## 2017-11-20 PROCEDURE — 99233 SBSQ HOSP IP/OBS HIGH 50: CPT | Mod: ,,, | Performed by: INTERNAL MEDICINE

## 2017-11-20 PROCEDURE — 36415 COLL VENOUS BLD VENIPUNCTURE: CPT

## 2017-11-20 PROCEDURE — 97530 THERAPEUTIC ACTIVITIES: CPT

## 2017-11-20 PROCEDURE — 25000003 PHARM REV CODE 250: Performed by: INTERNAL MEDICINE

## 2017-11-20 PROCEDURE — 97110 THERAPEUTIC EXERCISES: CPT

## 2017-11-20 PROCEDURE — 83735 ASSAY OF MAGNESIUM: CPT

## 2017-11-20 RX ORDER — HYDROCODONE BITARTRATE AND ACETAMINOPHEN 7.5; 325 MG/1; MG/1
1 TABLET ORAL EVERY 6 HOURS PRN
Status: DISCONTINUED | OUTPATIENT
Start: 2017-11-20 | End: 2017-11-21

## 2017-11-20 RX ORDER — ENOXAPARIN SODIUM 100 MG/ML
40 INJECTION SUBCUTANEOUS EVERY 24 HOURS
Status: DISCONTINUED | OUTPATIENT
Start: 2017-11-20 | End: 2017-12-19 | Stop reason: HOSPADM

## 2017-11-20 RX ADMIN — GABAPENTIN 600 MG: 300 CAPSULE ORAL at 05:11

## 2017-11-20 RX ADMIN — VANCOMYCIN HYDROCHLORIDE 1250 MG: 100 INJECTION, POWDER, LYOPHILIZED, FOR SOLUTION INTRAVENOUS at 09:11

## 2017-11-20 RX ADMIN — STANDARDIZED SENNA CONCENTRATE AND DOCUSATE SODIUM 1 TABLET: 8.6; 5 TABLET, FILM COATED ORAL at 09:11

## 2017-11-20 RX ADMIN — HYDROCODONE BITARTRATE AND ACETAMINOPHEN 1 TABLET: 7.5; 325 TABLET ORAL at 12:11

## 2017-11-20 RX ADMIN — MORPHINE SULFATE 4 MG: 2 INJECTION, SOLUTION INTRAMUSCULAR; INTRAVENOUS at 01:11

## 2017-11-20 RX ADMIN — HYDROCODONE BITARTRATE AND ACETAMINOPHEN 1 TABLET: 7.5; 325 TABLET ORAL at 08:11

## 2017-11-20 RX ADMIN — CYCLOBENZAPRINE HYDROCHLORIDE 5 MG: 5 TABLET, FILM COATED ORAL at 12:11

## 2017-11-20 RX ADMIN — MORPHINE SULFATE 4 MG: 2 INJECTION, SOLUTION INTRAMUSCULAR; INTRAVENOUS at 05:11

## 2017-11-20 RX ADMIN — GABAPENTIN 600 MG: 300 CAPSULE ORAL at 09:11

## 2017-11-20 RX ADMIN — MORPHINE SULFATE 4 MG: 2 INJECTION, SOLUTION INTRAMUSCULAR; INTRAVENOUS at 09:11

## 2017-11-20 RX ADMIN — ENOXAPARIN SODIUM 40 MG: 100 INJECTION SUBCUTANEOUS at 06:11

## 2017-11-20 RX ADMIN — GABAPENTIN 600 MG: 300 CAPSULE ORAL at 01:11

## 2017-11-20 RX ADMIN — CEFEPIME 2 G: 2 INJECTION, POWDER, FOR SOLUTION INTRAVENOUS at 07:11

## 2017-11-20 RX ADMIN — HYDROCODONE BITARTRATE AND ACETAMINOPHEN 1 TABLET: 7.5; 325 TABLET ORAL at 07:11

## 2017-11-20 RX ADMIN — CYCLOBENZAPRINE HYDROCHLORIDE 5 MG: 5 TABLET, FILM COATED ORAL at 10:11

## 2017-11-20 RX ADMIN — CYCLOBENZAPRINE HYDROCHLORIDE 5 MG: 5 TABLET, FILM COATED ORAL at 07:11

## 2017-11-20 NOTE — PT/OT/SLP PROGRESS
"Physical Therapy  Treatment    Evgeny Wilde   MRN: [id number]   Admitting Diagnosis: Acute bilateral low back pain with bilateral sciatica    PT Received On: 11/20/17  PT Start Time: 0815     PT Stop Time: 0830    PT Total Time (min): 15 min       Billable Minutes:  Therapeutic Exercise 15 Min    Treatment Type: Treatment  PT/PTA: PT             General Precautions: Standard, fall  Orthopedic Precautions: N/A   Braces:      Do you have any cultural, spiritual, Roman [employer] conflicts, given your current situation?: none stated    Subjective:  Communicated with nurse prior to session.  "I'm doing alright bruh, I feel like my leg is getting worse. I don't know what they [place] do."    Pain/Comfort  Pain Rating 1: 6/10  Location - Side 1: Bilateral  Location - Orientation 1: lower  Location 1: back (radiating down R leg)  Pain Addressed 1: Reposition    Objective:   Patient found with: telemetry    Functional Mobility:  Bed Mobility:   Rolling/Turning to Left: Stand by assistance  Rolling/Turning Right: Stand by assistance  Scooting/Bridging: Stand by Assistance  Supine to Sit: Stand by Assistance  Sit to Supine: Stand by Assistance    Transfers:  Sit <> Stand Assistance: Stand By Assistance  Sit <> Stand Assistive Device: Rolling Walker    Gait:   Gait Distance: Activity did not occur    Balance:   Static Sit: GOOD: Takes MODERATE challenges from all directions  Dynamic Sit: GOOD: Maintains balance through MODERATE excursions of active trunk movement  Static Stand: FAIR: Maintains without assist but unable to take challenges  Dynamic stand: FAIR+: Needs CLOSE SUPERVISION during gait and is able to right self with minor LOB     Therapeutic Activities and Exercises:  Patient to receive HEP of sciatic nerve glides and seated/supine piriformis stretch  Patient to receive educational video via Youtube to further demonstrate sciatic nerve gliding  Patient to perform 10 reps of seated sciatic nerve glides     AM-PAC 6 CLICK " MOBILITY  How much help from another person does this patient currently need?   1 = Unable, Total/Dependent Assistance  2 = A lot, Maximum/Moderate Assistance  3 = A little, Minimum/Contact Guard/Supervision  4 = None, Modified [place]/Independent    Turning over in bed (including adjusting bedclothes, sheets and blankets)?: 4  Sitting down on and standing up from a chair with arms (e.g., wheelchair, bedside commode, etc.): 3  Moving from lying on back to sitting on the side of the bed?: 3  Moving to and from a bed to a chair (including a wheelchair)?: 3  Need to walk in hospital room?: 3  Climbing 3-5 steps with a railing?: 2  Total Score: 18    AM-PAC Raw Score CMS G-Code Modifier Level of Impairment Assistance   6 % Total / Unable   7 - 9 CM 80 - 100% Maximal Assist   10 - 14 CL 60 - 80% Moderate Assist   15 - 19 CK 40 - 60% Moderate Assist   20 - 22 CJ 20 - 40% Minimal Assist   23 CI 1-20% SBA / CGA   24 CH 0% Independent/ Mod I     Patient left supine with all lines intact, call button in reach, nurse notified and significant other present.    Assessment:  Evgeny Wilde is a 32 y.o. male with a medical diagnosis of Acute bilateral low back pain with bilateral sciatica and presents with aggravated sciatica symptoms radiating to RLE. Patient has been observed ambulating in the hallway multiple times since his initial eval, utilizing a RW and no assistance. Patient is able to transfer safely w/o assistance. Patient will benefit most from therapeutic exercise/acitivity to address symptoms of peripheral sciatica and outpatient PT followup.    Rehab identified problem list/impairments: Rehab identified problem list/impairments: weakness    Rehab potential is good.    Activity tolerance: Good    Discharge recommendations: Discharge Facility/Level Of Care Needs: home with home health     Barriers to discharge: Barriers to Discharge: Inaccessible home environment    Equipment recommendations: Equipment  Needed After Discharge: bath bench     GOALS:    Physical Therapy Goals        Problem: Physical Therapy Goal    Goal Priority Disciplines Outcome Goal Variances Interventions   Physical Therapy Goal     PT/OT, PT Ongoing (interventions implemented as appropriate)     Description:  Goals to be met by: 17     Patient will increase functional independence with mobility by performin. Supine to sit with Set-up [place]-Met  2. Sit to supine with Set-up [place]-Met  3. Rolling to Left and Right with Set-up Assistance.-Met  4. Sit to stand transfer with Supervision-Met  5. Bed to chair transfer with Stand-by Assistance using Rolling Walker-Met  6. Gait  x 50 feet with Supervision using Rolling Walker.-Met  7. Lower extremity exercise program of sciatic nerve glides to address sciatica symptoms.                    PLAN:    Patient to be seen 3 x/week  to address the above listed problems via gait training, therapeutic activities, therapeutic exercises, neuromuscular re-education  Plan of Care expires: 12/15/17  Plan of Care reviewed with: patient         Alex Foreman, PT  2017

## 2017-11-20 NOTE — PLAN OF CARE
Problem: Physical Therapy Goal  Goal: Physical Therapy Goal  Goals to be met by: 17     Patient will increase functional independence with mobility by performin. Supine to sit with Set-up [place]-Met  2. Sit to supine with Set-up [place]-Met  3. Rolling to Left and Right with Set-up Assistance.-Met  4. Sit to stand transfer with Supervision-Met  5. Bed to chair transfer with Stand-by Assistance using Rolling Walker-Met  6. Gait  x 50 feet with Supervision using Rolling Walker.-Met  7. Lower extremity exercise program of sciatic nerve glides to address sciatica symptoms.  Outcome: Ongoing (interventions implemented as appropriate)  Patient has reached max potential for mobility goals, further sessions should be focused on therex and thera to address sciatica symptoms.    Alex Foreman, PT  2017

## 2017-11-20 NOTE — PROGRESS NOTES
[facility] Hospital Medicine  Progress Note    Patient Name: Evgeny Wilde  MRN: [id number]  Patient Class: IP- Inpatient   Admission Date: 11/13/2017  Length of Stay: 7 days  Attending Physician: Davian Payne, *  Primary Care Provider: Primary Doctor [facility] Medicine Team: [facility] Uriah Perez MD    Subjective:     Principal Problem:Acute bilateral low back pain with bilateral sciatica    HPI:  32 years old male with no significant past medical Hx, present to the ED with progressive lower back pain with sciatica for the last month. He works on constructions, pain start suddenly 1 month ago, at the lower back, dull, aggravated with movements. No inciting factors. Went to [facility] and [facility] ER, he had CT and MRI and they told him he had disk problem and he need neuro follow up and provided with muscle relaxant and narcotic for pain control. Patient stated the medicine is gone and the ain is worse with numbness in his left leg. He is not working for the last month. Denied any trauma, recent infection, surgery, foreign body, recent IV drug abuse. Also denied any fever, mekhi loss, fecal or urine incontinence, saddle anaesthesia.          Hospital Course:  11/14: NSGY following             Blood cultures NGTD, Urine cultures pending.              CT lumbar spine concerning for spondylodiscitis.  11/15: afebrile overnight, blood CX NGTD, NSGY stated no indications for NSGY interventions this admission, they want follow him up as outpatient after after Abx. We will pursue IR for disk Bx/aspirate for Dx.       11/16: NAEON. Vitals stable. Pending disc aspiration via IR tomorrow.    11/17: NAEON. Vitals stable. Planned for L4-L5 biopsy today.    11/18: NAEON. Vitals stable. S/p L4-L5 disc aspiration. Cultures, pathology report pending.    11/19: NAEON. Vitals stable.S/p L4-L5 disc aspiration. Cultures, pathology report pending.    11/20: NAEON. Vitals stable. Pathology showing  acute and chronic OM. Cultures remain negative.    Interval History:     Mr. Wilde complained of persistent back pain this morning, unchanged from yesterday. On my evaluation he was ambulating to bed from the bathroom with the use of a walker with some difficulty. His pathology results have returned positive for OM (acute and chronic), though his cultures remain negative. We are treating with broad-spectrum empiric antibiotics with vancomycin and ceftriaxone per ID verbal recommendations (formal note pending).    Review of Systems   Constitutional: Negative for chills and fever.   HENT: Negative for trouble swallowing.    Eyes: Negative for visual disturbance.   Respiratory: Negative for chest tightness and shortness of breath.    Cardiovascular: Negative for chest pain and palpitations.   Gastrointestinal: Negative for abdominal pain, diarrhea and vomiting.   Genitourinary: Negative for difficulty urinating.   Musculoskeletal: Positive for back pain.   Neurological: Negative for dizziness and light-headedness.   Psychiatric/Behavioral: Negative for agitation and confusion.     Objective:     Vital Signs (Most Recent):  Temp: 98 °F (36.7 °C) (11/20/17 1531)  Pulse: 97 (11/20/17 1531)  Resp: 20 (11/20/17 0820)  BP: 126/77 (11/20/17 1531)  SpO2: 98 % (11/20/17 1531) Vital Signs (24h Range):  Temp:  [96.6 °F (35.9 °C)-98.8 °F (37.1 °C)] 98 °F (36.7 °C)  Pulse:  [] 97  Resp:  [14-20] 20  SpO2:  [98 %-100 %] 98 %  BP: (120-136)/(67-82) 126/77     Weight: 90.2 kg (198 lb 13.7 oz)  Body mass index is 25.53 kg/m².    Intake/Output Summary (Last 24 hours) at 11/20/17 1615  Last data filed at 11/20/17 0509   Gross per 24 hour   Intake              790 ml   Output                0 ml   Net              790 ml      Physical Exam   Constitutional: He is oriented to person, place, and time. He appears well-developed and well-nourished. No distress.   HENT:   Head: Normocephalic and atraumatic.   Eyes: EOM are normal. Pupils  are equal, round, and reactive to light.   Neck: Normal range of motion. Neck supple. No JVD present.   Cardiovascular: Normal rate and regular rhythm.  Exam reveals no gallop and no friction rub.    No murmur heard.  Pulmonary/Chest: Effort normal and breath sounds normal. No respiratory distress. He has no wheezes.   Abdominal: Soft. Bowel sounds are normal. He exhibits no distension and no mass. There is no tenderness.   Musculoskeletal: Normal range of motion. He exhibits no edema or tenderness.   Neurological: He is alert and oriented to person, place, and time. He displays normal reflexes. No cranial nerve deficit.   Skin: Skin is warm and dry. No rash noted. No erythema.   Psychiatric: He has a normal mood and affect. His behavior is normal.       Significant Labs:   CBC:    Recent Labs  Lab 11/19/17 0417 11/20/17  0401   WBC 3.66* 4.36   GRAN 33.0*  1.2* 39.6  1.7*   HGB 10.3* 10.7*   HCT 33.0* 33.7*    268       Chem 10:    Recent Labs  Lab 11/19/17 0417 11/20/17  0401    141   K 4.3 4.0    105   CO2 28 26   BUN 14 13   CREATININE 0.8 0.9   GLU 85 137*   CALCIUM 9.2 9.2   MG 2.0 1.7   PHOS 3.4 3.1       Significant Imaging:   None new    Assessment/Plan:      * Acute bilateral low back pain with bilateral sciatica    - Etiology discitis of lumbar region; please see that section for more details  - Continue symptomatic treatment with gabapentin 400mg TID, flexeril 5mg TID PRN, norco 7.5mg-325mg, and morphine 4mg IV for break-through pain. Changed PRN medication schedule to encourage NSAID use first and spaced out morphine to Q8H. Will continue to taper opioids.               Discitis of lumbar region    - NSGY and ID following, appreciate assistance  - CT lumbar spine concerning for spondylodiscitis at L4-L5, now s/p aspiration with path showing acute and chronic OM, though blood and urine cultures remain negative to date.  - ESR, CRP mildly elevated.  - Procal normal  - HIV Abs  negative  - Pain control as described above  - PT/OT following  - Started ceftriaxone and vancomycin at ID's verbal recommendation today  - Will plan for follow up in NSGY clinic in 6 weeks with MRI w/wo contrast & infectious labs. He will likely discharge to LTAC in the interim to complete IV antibiotics given substance abuse history          VTE Risk Mitigation         Ordered     enoxaparin injection 40 mg  Daily     Route:  Subcutaneous        11/20/17 1617     Medium Risk of VTE  Once      11/13/17 1910          Uriah Perez MD  Department of Hospital Medicine   [facility]
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
## 2017-11-20 NOTE — PT/OT/SLP PROGRESS
"Occupational Therapy  Treatment    Evgeny Wilde   MRN: [id number]   Admitting Diagnosis: Acute bilateral low back pain with bilateral sciatica    OT Date of Treatment: 11/20/17   OT Start Time: 1130  OT Stop Time: 1155  OT Total Time (min): 25 min    Billable Minutes:  Self Care/Home Management 13 and Therapeutic Activity 12    General Precautions: Standard, fall  Orthopedic Precautions: N/A  Braces: N/A    Do you have any cultural, spiritual, Taoism conflicts, given your current situation?: none stated    Subjective:  Communicated with RN prior to session.  "i'm trying to get out of here.  I dont know what is going on with my back."  Pain/Comfort  Pain Rating 1: 6/10  Location - Side 1: Bilateral  Location - Orientation 1: lower  Location 1: back  Pain Addressed 1: Reposition, Distraction    Objective:  Patient found with: telemetry     Functional Mobility:  Bed Mobility:  Rolling/Turning to Left: Independent  Rolling/Turning Right: Independent  Scooting/Bridging: Independent  Supine to Sit: Independent    Transfers:   Toilet Transfer Technique: Stand Pivot  Toilet Transfer Assistance: Stand By Assistance  Toilet Transfer Assistive Device: Rolling Walker    Functional Ambulation: Pt able to perform in room mobility with rolling walker with pain and verbal cues for pacing    Activities of Daily Living:     UE Dressing Level of Assistance: Independent  Toileting Where Assessed: Toilet  Toileting Level of Assistance: Supervision    Balance:   Static Sit: NORMAL: No deviations seen in posture held statically  Dynamic Sit: NORMAL: No deviations seen in posture held dynamically  Static Stand: FAIR+: Takes MINIMAL challenges from all directions  Dynamic stand: FAIR+: Needs CLOSE SUPERVISION during gait and is able to right self with minor LOB    Therapeutic Activities and Exercises:  Pt educated on home safety, expectations for gains.  Bed mobility SBA for improved performance.  Toileting with SBA but no physical assist. " " Problem solving for return to home.     AM-PAC 6 CLICK ADL   How much help from another person does this patient currently need?   1 = Unable, Total/Dependent Assistance  2 = A lot, Maximum/Moderate Assistance  3 = A little, Minimum/Contact Guard/Supervision  4 = None, Modified [place]/Independent    Putting on and taking off regular lower body clothing? : 2  Bathing (including washing, rinsing, drying)?: 2 (lower extremity)  Toileting, which includes using toilet, bedpan, or urinal? : 3  Putting on and taking off regular upper body clothing?: 3  Taking care of personal grooming such as brushing teeth?: 4  Eating meals?: 4  Total Score: 18     AM-PAC Raw Score CMS "G-Code Modifier Level of Impairment Assistance   6 % Total / Unable   7 - 8 CM 80 - 100% Maximal Assist   9-13 CL 60 - 80% Moderate Assist   14 - 19 CK 40 - 60% Moderate Assist   20 - 22 CJ 20 - 40% Minimal Assist   23 CI 1-20% SBA / CGA   24 CH 0% Independent/ Mod I       Patient left supine with all lines intact and call button in reach    ASSESSMENT:  Evgeny Wilde is a 32 y.o. male with a medical diagnosis of Acute bilateral low back pain with bilateral sciatica and presents with improving self care and functional mobility but with continued pain. Pt approaching goals met and patient accepting off possible continued pain and provided deep breathing and calming techniques .  Cont with POC    Rehab identified problem list/impairments: Rehab identified problem list/impairments: weakness    Rehab potential is good.    Activity tolerance: Good    Discharge recommendations: Discharge Facility/Level Of Care Needs: home with home health     Barriers to discharge: Barriers to Discharge: Inaccessible home environment    Equipment recommendations: bath bench     GOALS:    Occupational Therapy Goals        Problem: Occupational Therapy Goal    Goal Priority Disciplines Outcome Interventions   Occupational Therapy Goal     OT, PT/OT Ongoing " (interventions implemented as appropriate)    Description:  Goals to be met by: 12/10/17    Patient will increase functional independence with ADLs by performing:    UE Dressing with [place].  LE Dressing with [place].  Grooming while standing at sink with [place].  Toileting from toilet with [place] for hygiene and clothing management.   Bathing from  sitting at sink with [place].                      Plan:  Patient to be seen 3 x/week to address the above listed problems via self-care/home management, therapeutic activities, therapeutic exercises  Plan of Care expires: 12/14/17  Plan of Care reviewed with: patient         Palak Nobles, OT  11/20/2017

## 2017-11-20 NOTE — ASSESSMENT & PLAN NOTE
- Etiology discitis of lumbar region; please see that section for more details  - Continue symptomatic treatment with gabapentin 400mg TID, flexeril 5mg TID PRN, norco 7.5mg-325mg, and morphine 4mg IV for break-through pain. Changed PRN medication schedule to encourage NSAID use first and spaced out morphine to Q8H. Will continue to taper opioids.
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
## 2017-11-20 NOTE — PLAN OF CARE
Problem: Patient Care Overview  Goal: Plan of Care Review  No acute changes overnight. Pts pain seems to be controlled well with PRN regimen. Continues to c/o pain radiating down R leg. Heat packs given for extra pain relief. Vitals stable. Free from falls. No other changes noted. Will continue to monitor.

## 2017-11-20 NOTE — PLAN OF CARE
Sw asked to send out LTAC referrals by  [facility] as Pt has a known drug history and no infusion company will be able to provide services to Pt. Sw to follow.

## 2017-11-20 NOTE — SUBJECTIVE & OBJECTIVE
Interval History:     Mr. Wilde complained of persistent back pain this morning, unchanged from yesterday. On my evaluation he was ambulating to bed from the bathroom with the use of a walker with some difficulty. His pathology results have returned positive for OM (acute and chronic), though his cultures remain negative. We are treating with broad-spectrum empiric antibiotics with vancomycin and ceftriaxone per ID verbal recommendations (formal note pending).    Review of Systems   Constitutional: Negative for chills and fever.   HENT: Negative for trouble swallowing.    Eyes: Negative for visual disturbance.   Respiratory: Negative for chest tightness and shortness of breath.    Cardiovascular: Negative for chest pain and palpitations.   Gastrointestinal: Negative for abdominal pain, diarrhea and vomiting.   Genitourinary: Negative for difficulty urinating.   Musculoskeletal: Positive for back pain.   Neurological: Negative for dizziness and light-headedness.   Psychiatric/Behavioral: Negative for agitation and confusion.     Objective:     Vital Signs (Most Recent):  Temp: 98 °F (36.7 °C) (11/20/17 1531)  Pulse: 97 (11/20/17 1531)  Resp: 20 (11/20/17 0820)  BP: 126/77 (11/20/17 1531)  SpO2: 98 % (11/20/17 1531) Vital Signs (24h Range):  Temp:  [96.6 °F (35.9 °C)-98.8 °F (37.1 °C)] 98 °F (36.7 °C)  Pulse:  [] 97  Resp:  [14-20] 20  SpO2:  [98 %-100 %] 98 %  BP: (120-136)/(67-82) 126/77     Weight: 90.2 kg (198 lb 13.7 oz)  Body mass index is 25.53 kg/m².    Intake/Output Summary (Last 24 hours) at 11/20/17 1615  Last data filed at 11/20/17 0509   Gross per 24 hour   Intake              790 ml   Output                0 ml   Net              790 ml      Physical Exam   Constitutional: He is oriented to person, place, and time. He appears well-developed and well-nourished. No distress.   HENT:   Head: Normocephalic and atraumatic.   Eyes: EOM are normal. Pupils are equal, round, and reactive to light.   Neck:  Normal range of motion. Neck supple. No JVD present.   Cardiovascular: Normal rate and regular rhythm.  Exam reveals no gallop and no friction rub.    No murmur heard.  Pulmonary/Chest: Effort normal and breath sounds normal. No respiratory distress. He has no wheezes.   Abdominal: Soft. Bowel sounds are normal. He exhibits no distension and no mass. There is no tenderness.   Musculoskeletal: Normal range of motion. He exhibits no edema or tenderness.   Neurological: He is alert and oriented to person, place, and time. He displays normal reflexes. No cranial nerve deficit.   Skin: Skin is warm and dry. No rash noted. No erythema.   Psychiatric: He has a normal mood and affect. His behavior is normal.       Significant Labs:   CBC:    Recent Labs  Lab 11/19/17 0417 11/20/17  0401   WBC 3.66* 4.36   GRAN 33.0*  1.2* 39.6  1.7*   HGB 10.3* 10.7*   HCT 33.0* 33.7*    268       Chem 10:    Recent Labs  Lab 11/19/17 0417 11/20/17  0401    141   K 4.3 4.0    105   CO2 28 26   BUN 14 13   CREATININE 0.8 0.9   GLU 85 137*   CALCIUM 9.2 9.2   MG 2.0 1.7   PHOS 3.4 3.1       Significant Imaging:   None new

## 2017-11-20 NOTE — PLAN OF CARE
Problem: Occupational Therapy Goal  Goal: Occupational Therapy Goal  Goals to be met by: 12/10/17    Patient will increase functional independence with ADLs by performing:    UE Dressing with [place].  LE Dressing with [place].  Grooming while standing at sink with [place].  Toileting from toilet with [place] for hygiene and clothing management.   Bathing from  sitting at sink with [place].     Outcome: Ongoing (interventions implemented as appropriate)  Goals addressed with approaching return to PLOF.  Palak Nobles, OT  11/20/2017
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
## 2017-11-20 NOTE — ASSESSMENT & PLAN NOTE
- NSGY and ID following, appreciate assistance  - CT lumbar spine concerning for spondylodiscitis at L4-L5, now s/p aspiration with path showing acute and chronic OM, though blood and urine cultures remain negative to date.  - ESR, CRP mildly elevated.  - Procal normal  - HIV Abs negative  - Pain control as described above  - PT/OT following  - Started ceftriaxone and vancomycin at ID's verbal recommendation today  - Will plan for follow up in NSGY clinic in 6 weeks with MRI w/wo contrast & infectious labs. He will likely discharge to LTAC in the interim to complete IV antibiotics given substance abuse history

## 2017-11-20 NOTE — PLAN OF CARE
Problem: Patient Care Overview  Goal: Plan of Care Review  Outcome: Ongoing (interventions implemented as appropriate)  Pt AAOX4. AVSS. Pt c/o pain 9/10 in lower back and leg, controlled with PRN medications. Pt pain medication regimen adjusted. Pt frequently ambulates downstairs. Pt remains free from falls. Pt safety maintained. Hourly rounding performed.

## 2017-11-20 NOTE — ASSESSMENT & PLAN NOTE
32 year old male with no significant past medical history who presented to the ED with progressive lower back pain with sciatica for the last month.  Pain started suddenly 1 month ago, at the lower back, dull, aggravated with movements. No inciting factors. He went to [facility] and [facility] ER, he had CT and MRI and they told him he had disk problem and he need neuro follow up and provided with muscle relaxant and narcotic for pain control.  He ran out of pain medication, pain became worse with numbness in left leg and he presented to the [facility] ED.   Denied any trauma, recent infection, surgery, foreign body.  He does have history of IVDU and urine tox is + for benzos, cocaine, THC.    No associated fevers, chills.  No saddle anesthesia, bowel or bladder incontinence.      CT lumbar spine here howed endplate destruction of L4/5 concerning for osteodiscitis.   Seen by Neurosurgery who recommends no acute surgical intervention, but  follow up after initial conservative intervention with antibiotics.   IR aspiration on 11/17 is culture negative to date, but pathology reports acute and chronic osteomyelitis.  Blood cultures negative.   ESR 55, CRP 9.7, procalcitonin wnl.  HIV negative  Hemodynamically and neurologically stable.       -  Given demonstrated osteomyelitis on Path, recommend initiating empiric treatment with IV vancomycin (15 mg/kg IV q 12 hours)  and ceftriaxone 2 grams IV q 24 hours.    - Will need 6 - 8 weeks of antibiotics.    - Will follow cultures and adjust abx accordingly.     - Understand there may be issues with outpatient IV abx.   Will discuss other possible options with Primary Team tomorrow, though IV would be the recommended treatment for vertebral osteomyelitis.    - Will follow up tomorrow.     Patient seen by, and plan discussed with, ID staff  Discussed with Primary Team

## 2017-11-21 PROCEDURE — 99233 SBSQ HOSP IP/OBS HIGH 50: CPT | Mod: ,,, | Performed by: INTERNAL MEDICINE

## 2017-11-21 PROCEDURE — 63600175 PHARM REV CODE 636 W HCPCS: Performed by: INTERNAL MEDICINE

## 2017-11-21 PROCEDURE — 20600001 HC STEP DOWN PRIVATE ROOM

## 2017-11-21 PROCEDURE — 99233 SBSQ HOSP IP/OBS HIGH 50: CPT | Mod: ,,, | Performed by: HOSPITALIST

## 2017-11-21 PROCEDURE — 25000003 PHARM REV CODE 250: Performed by: STUDENT IN AN ORGANIZED HEALTH CARE EDUCATION/TRAINING PROGRAM

## 2017-11-21 PROCEDURE — 25000003 PHARM REV CODE 250: Performed by: INTERNAL MEDICINE

## 2017-11-21 RX ORDER — CYCLOBENZAPRINE HCL 5 MG
10 TABLET ORAL 3 TIMES DAILY PRN
Status: DISCONTINUED | OUTPATIENT
Start: 2017-11-21 | End: 2017-11-29 | Stop reason: ALTCHOICE

## 2017-11-21 RX ORDER — HYDROCODONE BITARTRATE AND ACETAMINOPHEN 10; 325 MG/1; MG/1
1 TABLET ORAL EVERY 6 HOURS PRN
Status: DISCONTINUED | OUTPATIENT
Start: 2017-11-21 | End: 2017-11-25

## 2017-11-21 RX ADMIN — CYCLOBENZAPRINE HYDROCHLORIDE 10 MG: 5 TABLET, FILM COATED ORAL at 12:11

## 2017-11-21 RX ADMIN — GABAPENTIN 600 MG: 300 CAPSULE ORAL at 10:11

## 2017-11-21 RX ADMIN — CYCLOBENZAPRINE HYDROCHLORIDE 10 MG: 5 TABLET, FILM COATED ORAL at 10:11

## 2017-11-21 RX ADMIN — GABAPENTIN 600 MG: 300 CAPSULE ORAL at 12:11

## 2017-11-21 RX ADMIN — HYDROCODONE BITARTRATE AND ACETAMINOPHEN 1 TABLET: 10; 325 TABLET ORAL at 04:11

## 2017-11-21 RX ADMIN — VANCOMYCIN HYDROCHLORIDE 1250 MG: 100 INJECTION, POWDER, LYOPHILIZED, FOR SOLUTION INTRAVENOUS at 11:11

## 2017-11-21 RX ADMIN — HYDROCODONE BITARTRATE AND ACETAMINOPHEN 1 TABLET: 7.5; 325 TABLET ORAL at 04:11

## 2017-11-21 RX ADMIN — HYDROCODONE BITARTRATE AND ACETAMINOPHEN 1 TABLET: 7.5; 325 TABLET ORAL at 10:11

## 2017-11-21 RX ADMIN — HYDROCODONE BITARTRATE AND ACETAMINOPHEN 1 TABLET: 10; 325 TABLET ORAL at 10:11

## 2017-11-21 RX ADMIN — GABAPENTIN 600 MG: 300 CAPSULE ORAL at 05:11

## 2017-11-21 RX ADMIN — IBUPROFEN 400 MG: 400 TABLET, FILM COATED ORAL at 08:11

## 2017-11-21 RX ADMIN — STANDARDIZED SENNA CONCENTRATE AND DOCUSATE SODIUM 1 TABLET: 8.6; 5 TABLET, FILM COATED ORAL at 10:11

## 2017-11-21 RX ADMIN — CYCLOBENZAPRINE HYDROCHLORIDE 5 MG: 5 TABLET, FILM COATED ORAL at 04:11

## 2017-11-21 RX ADMIN — IBUPROFEN 400 MG: 400 TABLET, FILM COATED ORAL at 12:11

## 2017-11-21 RX ADMIN — CEFEPIME 2 G: 2 INJECTION, POWDER, FOR SOLUTION INTRAVENOUS at 04:11

## 2017-11-21 RX ADMIN — MORPHINE SULFATE 4 MG: 2 INJECTION, SOLUTION INTRAMUSCULAR; INTRAVENOUS at 07:11

## 2017-11-21 RX ADMIN — ENOXAPARIN SODIUM 40 MG: 100 INJECTION SUBCUTANEOUS at 04:11

## 2017-11-21 NOTE — SUBJECTIVE & OBJECTIVE
Interval History: No acute events overnight.   Afebrile.   Empiric vancomycin and ceftriaxone started yesterday.  Pain about the same       Review of Systems   Constitutional: Positive for activity change. Negative for appetite change, chills, diaphoresis, fatigue, fever and unexpected weight change.   HENT: Negative.    Eyes: Negative.    Respiratory: Negative for cough, shortness of breath and wheezing.    Cardiovascular: Negative for chest pain, palpitations and leg swelling.   Gastrointestinal: Negative for abdominal pain, diarrhea, nausea and vomiting.   Genitourinary: Negative for difficulty urinating and dysuria.   Musculoskeletal: Positive for back pain.   Skin: Negative for rash.   Neurological: Positive for weakness and numbness. Negative for dizziness and headaches.   Hematological: Negative for adenopathy.   Psychiatric/Behavioral: Negative for agitation. The patient is not nervous/anxious.      Objective:     Vital Signs (Most Recent):  Temp: 97.6 °F (36.4 °C) (11/21/17 1537)  Pulse: 104 (11/21/17 1537)  Resp: 16 (11/21/17 0823)  BP: 131/77 (11/21/17 1537)  SpO2: 99 % (11/21/17 1537) Vital Signs (24h Range):  Temp:  [97.2 °F (36.2 °C)-98.2 °F (36.8 °C)] 97.6 °F (36.4 °C)  Pulse:  [] 104  Resp:  [16-18] 16  SpO2:  [98 %-100 %] 99 %  BP: (116-131)/(62-78) 131/77     Weight: 90.2 kg (198 lb 13.7 oz)  Body mass index is 25.53 kg/m².    Estimated Creatinine Clearance: 135.7 mL/min (based on SCr of 0.9 mg/dL).    Physical Exam   Constitutional: He is oriented to person, place, and time. He appears well-developed and well-nourished. No distress.   HENT:   Head: Normocephalic.   Eyes: Conjunctivae are normal. No scleral icterus.   Neck: Normal range of motion. Neck supple.   Cardiovascular: Normal rate, regular rhythm and normal heart sounds.  Exam reveals no friction rub.    No murmur heard.  Pulmonary/Chest: Effort normal and breath sounds normal. No respiratory distress.   Abdominal: Soft. There is  no tenderness.   Musculoskeletal: He exhibits no edema.   Neurological: He is alert and oriented to person, place, and time.   Moves all extremities.     Skin: Skin is warm and dry. No rash noted. He is not diaphoretic.   Multiple tattoos.    Psychiatric: He has a normal mood and affect. His behavior is normal.   Vitals reviewed.      Significant Labs:   Blood Culture:     Recent Labs  Lab 11/13/17  1842   LABBLOO No growth after 5 days.  No growth after 5 days.     CBC:     Recent Labs  Lab 11/20/17  0401   WBC 4.36   HGB 10.7*   HCT 33.7*        CMP:     Recent Labs  Lab 11/20/17  0401      K 4.0      CO2 26   *   BUN 13   CREATININE 0.9   CALCIUM 9.2   ANIONGAP 10   EGFRNONAA >60.0     Wound Culture:     Recent Labs  Lab 11/17/17  0949   LABAERO No growth       Significant Imaging: I have reviewed all pertinent imaging results/findings within the past 24 hours.

## 2017-11-21 NOTE — ASSESSMENT & PLAN NOTE
- Etiology discitis of lumbar region; please see that section for more details  - Symptomatic treatment with new pain regiment- gabapentin 600mg TID, increasing flexeril 10mg TID PRN, and PRN Norco 10mg-325mg for break-through pain.   - Will continue to taper opioids as applicable.  - will continue to FU patient's pain and alter meds as needed  - currently stable

## 2017-11-21 NOTE — ASSESSMENT & PLAN NOTE
- NSGY and ID following, appreciate assistance  - CT lumbar spine concerning for spondylodiscitis at L4-L5, now s/p aspiration with path showing acute and chronic OM, though blood and urine cultures remain negative to date.  - ESR, CRP mildly elevated.  - Procal normal  - HIV Abs negative  - Pain control as described above  - PT/OT following  - Patient on ceftriaxone and vancomycin as ID's recommendation- desire 6-8 weeks of tx- end date Jan 1st at earliest   - Will plan for follow up in NSGY clinic once ABX are done with MRI w/wo contrast & infectious labs.   - Plan to discharge to LTAC in the interim to complete IV antibiotics given substance abuse history

## 2017-11-21 NOTE — SUBJECTIVE & OBJECTIVE
Interval History: See hospital course above.      Review of Systems   Constitutional: Negative for chills, fever and unexpected weight change.   HENT: Negative for hearing loss.    Eyes: Negative for visual disturbance.   Respiratory: Negative for shortness of breath.    Cardiovascular: Negative for chest pain.   Gastrointestinal: Negative for abdominal pain, diarrhea, nausea and vomiting.   Genitourinary: Negative for dysuria.   Musculoskeletal: Positive for back pain. Negative for arthralgias.   Skin: Negative for rash.   Neurological: Positive for numbness (toes). Negative for seizures.   Hematological: Negative for adenopathy. Does not bruise/bleed easily.     Objective:     Vital Signs (Most Recent):  Temp: 98.2 °F (36.8 °C) (11/21/17 1125)  Pulse: 89 (11/21/17 1125)  Resp: 16 (11/21/17 0823)  BP: 120/77 (11/21/17 1125)  SpO2: 100 % (11/21/17 1125) Vital Signs (24h Range):  Temp:  [97.2 °F (36.2 °C)-98.2 °F (36.8 °C)] 98.2 °F (36.8 °C)  Pulse:  [] 89  Resp:  [16-18] 16  SpO2:  [98 %-100 %] 100 %  BP: (116-128)/(62-78) 120/77     Weight: 90.2 kg (198 lb 13.7 oz)  Body mass index is 25.53 kg/m².    Intake/Output Summary (Last 24 hours) at 11/21/17 1423  Last data filed at 11/20/17 2158   Gross per 24 hour   Intake              600 ml   Output                0 ml   Net              600 ml      Physical Exam   Constitutional: He appears well-developed and well-nourished.   HENT:   Head: Normocephalic and atraumatic.   Eyes: EOM are normal. Pupils are equal, round, and reactive to light.   Neck: No tracheal deviation present. No thyromegaly present.   Cardiovascular: Normal rate.    No murmur heard.  Pulmonary/Chest: Effort normal and breath sounds normal. He has no wheezes. He has no rales.   Abdominal: There is no tenderness. No hernia.   Musculoskeletal: He exhibits tenderness (lumbar spine and down both legs). He exhibits no edema.   Neurological: A sensory deficit (BL toes.) is present. No cranial nerve  deficit. He exhibits normal muscle tone.   Skin: Skin is warm. No rash noted.   Psychiatric: He has a normal mood and affect.       Significant Labs:   CBC:   Recent Labs  Lab 11/20/17  0401   WBC 4.36   HGB 10.7*   HCT 33.7*        CMP:   Recent Labs  Lab 11/20/17  0401      K 4.0      CO2 26   *   BUN 13   CREATININE 0.9   CALCIUM 9.2   ANIONGAP 10   EGFRNONAA >60.0       Significant Imaging: I have reviewed all pertinent imaging results/findings within the past 24 hours.

## 2017-11-21 NOTE — PLAN OF CARE
Problem: Patient Care Overview  Goal: Plan of Care Review  No acute changes overnight. Pt AAOx4. Ambulatory and independent. Pt requesting all PRN medications around the clock, at one point rating pain 12 out of 10. Pt noted on several occasions to be sleeping comfortably and when awoken would immediately c/o pain. IV Rocephin and Vanc initiated on shift. Pt afebrile. No other issues noted at this time. Will continue to monitor.

## 2017-11-21 NOTE — PLAN OF CARE
Sw did provide labs, MAR and cultures and sensitivity to [facility] per request, Sw to follow.

## 2017-11-21 NOTE — PLAN OF CARE
Patient will need 6-8 weeks of IV antibiotics. Due to patient's past history of IVDU CM/SW working on LTAC placement.       11/21/17 6331   Discharge Reassessment   Assessment Type Discharge Planning Reassessment   Provided patient/caregiver education on the expected discharge date and the discharge plan Yes   Do you have any problems affording any of your prescribed medications? TBD   Discharge Plan A Long-term acute care facility (LTAC)   Can the patient answer the patient profile reliably? Yes, cognitively intact   How does the patient rate their overall health at the present time? Fair   Describe the patient's ability to walk at the present time. Walks with the help of equipment   How often would a person be available to care for the patient? Whenever needed   During the past month, has the patient often been bothered by feeling down, depressed or hopeless? No   During the past month, has the patient often been bothered by little interest or pleasure in doing things? No

## 2017-11-21 NOTE — ASSESSMENT & PLAN NOTE
32 year old male with no significant past medical history who presented to the ED with progressive lower back pain with sciatica for the last month.  Pain started suddenly 1 month ago, at the lower back, dull, aggravated with movements. No inciting factors. He went to [facility] and [facility] ER, he had CT and MRI and they told him he had disk problem and he need neuro follow up and provided with muscle relaxant and narcotic for pain control.  He ran out of pain medication, pain became worse with numbness in left leg and he presented to the [facility] ED.   Denied any trauma, recent infection, surgery, foreign body.  He does have history of IVDU and urine tox is + for benzos, cocaine, THC.    No associated fevers, chills.  No saddle anesthesia, bowel or bladder incontinence.      CT lumbar spine here howed endplate destruction of L4/5 concerning for osteodiscitis.   Seen by Neurosurgery who recommends no acute surgical intervention, but  follow up after initial conservative intervention with antibiotics.   IR aspiration on 11/17 remains culture negative to date, but pathology reports acute and chronic osteomyelitis.  Blood cultures negative.   ESR 55, CRP 9.7, procalcitonin wnl.  HIV negative. Remains  hemodynamically and neurologically stable.       -  Given demonstrated osteomyelitis on Path, recommend initiating empiric treatment with IV vancomycin (15 mg/kg IV q 12 hours)  and ceftriaxone 2 grams IV q 24 hours.    -  Vancomycin trough before 4th dose.  Adjust to maintain trough 15-20   -  Will need minimum of weeks of IV antibiotics.    -  Weekly cbc, cmp, crp, esr and vancomycin trough and fax results to ID at [phone]   -  If going to LTAC - recommend ID consult at LTAC    -  Follow up with ID 14 days.    -  Understand that in order to complete IV antibiotics will need to go to LTAC for inpatient therapy.  Discussed briefly with patient and advised best course of therapy is IV.  However, if patient determines he does not  wish to proceed with IV could consider oral ciprofloxacin and doxycycline which we have explained would not be optimal.     -  Will follow up tomorrow for final determination as to course of therapy.     Patient seen by, and plan discussed with, ID staff  Discussed with Primary Team

## 2017-11-21 NOTE — PROGRESS NOTES
[facility] Hospital Medicine  Progress Note    Patient Name: Evgeny Wilde  MRN: [id number]  Patient Class: IP- Inpatient   Admission Date: 11/13/2017  Length of Stay: 8 days  Attending Physician: Davian Payne, *  Primary Care Provider: [facility] Medicine Team: [facility] Ron Dubose MD    Subjective:     Principal Problem:Acute bilateral low back pain with bilateral sciatica    HPI:  32 years old male with no significant past medical Hx, present to the ED with progressive lower back pain with sciatica for the last month. He works on constructions, pain start suddenly 1 month ago, at the lower back, dull, aggravated with movements. No inciting factors. Went to [facility] and [facility] ER, he had CT and MRI and they told him he had disk problem and he need neuro follow up and provided with muscle relaxant and narcotic for pain control. Patient stated the medicine is gone and the ain is worse with numbness in his left leg. He is not working for the last month. Denied any trauma, recent infection, surgery, foreign body, recent IV drug abuse. Also denied any fever, mekhi loss, fecal or urine incontinence, saddle anaesthesia.          Hospital Course:  11/14: NSGY following             Blood cultures NGTD, Urine cultures pending.              CT lumbar spine concerning for spondylodiscitis.  11/15: afebrile overnight, blood CX NGTD, NSGY stated no indications for NSGY interventions this admission, they want follow him up as outpatient after after Abx. We will pursue IR for disk Bx/aspirate for Dx.     11/16: NAEON. Vitals stable. Pending disc aspiration via IR tomorrow.  11/17: NAEON. Vitals stable. Planned for L4-L5 biopsy today.  11/18: NAEON. Vitals stable. S/p L4-L5 disc aspiration. Cultures, pathology report pending.  11/19: NAEON. Vitals stable.S/p L4-L5 disc aspiration. Cultures, pathology report pending.  11/20: NAEON. Vitals stable. Pathology showing acute and  chronic OM. Cultures remain negative.  11/21: NAEON. Vitals stable. Patient with trouble sleeping last night with pain in the buttocks and back as well as numbness in the toes. Pt still without adequate pain control.    Interval History: See hospital course above.      Review of Systems   Constitutional: Negative for chills, fever and unexpected weight change.   HENT: Negative for hearing loss.    Eyes: Negative for visual disturbance.   Respiratory: Negative for shortness of breath.    Cardiovascular: Negative for chest pain.   Gastrointestinal: Negative for abdominal pain, diarrhea, nausea and vomiting.   Genitourinary: Negative for dysuria.   Musculoskeletal: Positive for back pain. Negative for arthralgias.   Skin: Negative for rash.   Neurological: Positive for numbness (toes). Negative for seizures.   Hematological: Negative for adenopathy. Does not bruise/bleed easily.     Objective:     Vital Signs (Most Recent):  Temp: 98.2 °F (36.8 °C) (11/21/17 1125)  Pulse: 89 (11/21/17 1125)  Resp: 16 (11/21/17 0823)  BP: 120/77 (11/21/17 1125)  SpO2: 100 % (11/21/17 1125) Vital Signs (24h Range):  Temp:  [97.2 °F (36.2 °C)-98.2 °F (36.8 °C)] 98.2 °F (36.8 °C)  Pulse:  [] 89  Resp:  [16-18] 16  SpO2:  [98 %-100 %] 100 %  BP: (116-128)/(62-78) 120/77     Weight: 90.2 kg (198 lb 13.7 oz)  Body mass index is 25.53 kg/m².    Intake/Output Summary (Last 24 hours) at 11/21/17 1423  Last data filed at 11/20/17 2158   Gross per 24 hour   Intake              600 ml   Output                0 ml   Net              600 ml      Physical Exam   Constitutional: He appears well-developed and well-nourished.   HENT:   Head: Normocephalic and atraumatic.   Eyes: EOM are normal. Pupils are equal, round, and reactive to light.   Neck: No tracheal deviation present. No thyromegaly present.   Cardiovascular: Normal rate.    No murmur heard.  Pulmonary/Chest: Effort normal and breath sounds normal. He has no wheezes. He has no rales.    Abdominal: There is no tenderness. No hernia.   Musculoskeletal: He exhibits tenderness (lumbar spine and down both legs). He exhibits no edema.   Neurological: A sensory deficit (BL toes.) is present. No cranial nerve deficit. He exhibits normal muscle tone.   Skin: Skin is warm. No rash noted.   Psychiatric: He has a normal mood and affect.       Significant Labs:   CBC:   Recent Labs  Lab 11/20/17 0401   WBC 4.36   HGB 10.7*   HCT 33.7*        CMP:   Recent Labs  Lab 11/20/17 0401      K 4.0      CO2 26   *   BUN 13   CREATININE 0.9   CALCIUM 9.2   ANIONGAP 10   EGFRNONAA >60.0       Significant Imaging: I have reviewed all pertinent imaging results/findings within the past 24 hours.    Assessment/Plan:      * Acute bilateral low back pain with bilateral sciatica    - Etiology discitis of lumbar region; please see that section for more details  - Symptomatic treatment with new pain regiment- gabapentin 600mg TID, increasing flexeril 10mg TID PRN, and PRN Norco 10mg-325mg for break-through pain.   - Will continue to taper opioids as applicable.  - will continue to FU patient's pain and alter meds as needed  - currently stable              Discitis of lumbar region    - NSGY and ID following, appreciate assistance  - CT lumbar spine concerning for spondylodiscitis at L4-L5, now s/p aspiration with path showing acute and chronic OM, though blood and urine cultures remain negative to date.  - ESR, CRP mildly elevated.  - Procal normal  - HIV Abs negative  - Pain control as described above  - PT/OT following  - Patient on ceftriaxone and vancomycin as ID's recommendation- desire 6-8 weeks of tx- end date Jan 1st at earliest   - Will plan for follow up in NSGY clinic once ABX are done with MRI w/wo contrast & infectious labs.   - Plan to discharge to LTAC in the interim to complete IV antibiotics given substance abuse history          VTE Risk Mitigation         Ordered     enoxaparin  injection 40 mg  Daily     Route:  Subcutaneous        11/20/17 1617     Medium Risk of VTE  Once      11/13/17 1910              Ron Dubose MD  Department of Hospital Medicine   [facility]

## 2017-11-21 NOTE — PLAN OF CARE
Problem: Patient Care Overview  Goal: Plan of Care Review  Outcome: Ongoing (interventions implemented as appropriate)  Pt free of any injury or falls, VSS skin intact. IV antibiotics given per MD order. PRN pain medication given to pt per request will full relief.

## 2017-11-22 LAB
ANION GAP SERPL CALC-SCNC: 10 MMOL/L
BASOPHILS # BLD AUTO: 0.03 K/UL
BASOPHILS NFR BLD: 0.7 %
BUN SERPL-MCNC: 14 MG/DL
CALCIUM SERPL-MCNC: 9.9 MG/DL
CHLORIDE SERPL-SCNC: 100 MMOL/L
CO2 SERPL-SCNC: 28 MMOL/L
CREAT SERPL-MCNC: 0.8 MG/DL
DIFFERENTIAL METHOD: ABNORMAL
EOSINOPHIL # BLD AUTO: 0.1 K/UL
EOSINOPHIL NFR BLD: 1.2 %
ERYTHROCYTE [DISTWIDTH] IN BLOOD BY AUTOMATED COUNT: 14 %
EST. GFR  (AFRICAN AMERICAN): >60 ML/MIN/1.73 M^2
EST. GFR  (NON AFRICAN AMERICAN): >60 ML/MIN/1.73 M^2
GLUCOSE SERPL-MCNC: 84 MG/DL
HCT VFR BLD AUTO: 36 %
HGB BLD-MCNC: 11.2 G/DL
IMM GRANULOCYTES # BLD AUTO: 0.01 K/UL
IMM GRANULOCYTES NFR BLD AUTO: 0.2 %
LYMPHOCYTES # BLD AUTO: 2 K/UL
LYMPHOCYTES NFR BLD: 44.9 %
MCH RBC QN AUTO: 26.2 PG
MCHC RBC AUTO-ENTMCNC: 31.1 G/DL
MCV RBC AUTO: 84 FL
MONOCYTES # BLD AUTO: 0.7 K/UL
MONOCYTES NFR BLD: 15 %
NEUTROPHILS # BLD AUTO: 1.7 K/UL
NEUTROPHILS NFR BLD: 38 %
NRBC BLD-RTO: 0 /100 WBC
PLATELET # BLD AUTO: 284 K/UL
PMV BLD AUTO: 9.9 FL
POTASSIUM SERPL-SCNC: 4 MMOL/L
RBC # BLD AUTO: 4.27 M/UL
SODIUM SERPL-SCNC: 138 MMOL/L
VANCOMYCIN TROUGH SERPL-MCNC: 6.3 UG/ML
WBC # BLD AUTO: 4.34 K/UL

## 2017-11-22 PROCEDURE — 85025 COMPLETE CBC W/AUTO DIFF WBC: CPT

## 2017-11-22 PROCEDURE — 80048 BASIC METABOLIC PNL TOTAL CA: CPT

## 2017-11-22 PROCEDURE — 25000003 PHARM REV CODE 250: Performed by: STUDENT IN AN ORGANIZED HEALTH CARE EDUCATION/TRAINING PROGRAM

## 2017-11-22 PROCEDURE — 63600175 PHARM REV CODE 636 W HCPCS: Performed by: INTERNAL MEDICINE

## 2017-11-22 PROCEDURE — 80202 ASSAY OF VANCOMYCIN: CPT

## 2017-11-22 PROCEDURE — 20600001 HC STEP DOWN PRIVATE ROOM

## 2017-11-22 PROCEDURE — 97535 SELF CARE MNGMENT TRAINING: CPT

## 2017-11-22 PROCEDURE — 99233 SBSQ HOSP IP/OBS HIGH 50: CPT | Mod: ,,, | Performed by: HOSPITALIST

## 2017-11-22 PROCEDURE — 36415 COLL VENOUS BLD VENIPUNCTURE: CPT

## 2017-11-22 PROCEDURE — 63600175 PHARM REV CODE 636 W HCPCS: Performed by: STUDENT IN AN ORGANIZED HEALTH CARE EDUCATION/TRAINING PROGRAM

## 2017-11-22 PROCEDURE — 97110 THERAPEUTIC EXERCISES: CPT

## 2017-11-22 PROCEDURE — 99233 SBSQ HOSP IP/OBS HIGH 50: CPT | Mod: ,,, | Performed by: INTERNAL MEDICINE

## 2017-11-22 PROCEDURE — 97530 THERAPEUTIC ACTIVITIES: CPT

## 2017-11-22 PROCEDURE — 25000003 PHARM REV CODE 250: Performed by: INTERNAL MEDICINE

## 2017-11-22 RX ORDER — GABAPENTIN 300 MG/1
900 CAPSULE ORAL 3 TIMES DAILY
Status: DISCONTINUED | OUTPATIENT
Start: 2017-11-22 | End: 2017-12-19 | Stop reason: HOSPADM

## 2017-11-22 RX ORDER — IBUPROFEN 600 MG/1
600 TABLET ORAL EVERY 6 HOURS
Status: DISCONTINUED | OUTPATIENT
Start: 2017-11-22 | End: 2017-11-24

## 2017-11-22 RX ORDER — HYDROMORPHONE HYDROCHLORIDE 1 MG/ML
0.2 INJECTION, SOLUTION INTRAMUSCULAR; INTRAVENOUS; SUBCUTANEOUS ONCE
Status: COMPLETED | OUTPATIENT
Start: 2017-11-22 | End: 2017-11-22

## 2017-11-22 RX ORDER — IBUPROFEN 600 MG/1
600 TABLET ORAL EVERY 6 HOURS PRN
Status: DISCONTINUED | OUTPATIENT
Start: 2017-11-22 | End: 2017-11-22

## 2017-11-22 RX ADMIN — ENOXAPARIN SODIUM 40 MG: 100 INJECTION SUBCUTANEOUS at 04:11

## 2017-11-22 RX ADMIN — GABAPENTIN 900 MG: 300 CAPSULE ORAL at 02:11

## 2017-11-22 RX ADMIN — IBUPROFEN 400 MG: 400 TABLET, FILM COATED ORAL at 07:11

## 2017-11-22 RX ADMIN — IBUPROFEN 600 MG: 600 TABLET, FILM COATED ORAL at 06:11

## 2017-11-22 RX ADMIN — STANDARDIZED SENNA CONCENTRATE AND DOCUSATE SODIUM 1 TABLET: 8.6; 5 TABLET, FILM COATED ORAL at 09:11

## 2017-11-22 RX ADMIN — GABAPENTIN 900 MG: 300 CAPSULE ORAL at 09:11

## 2017-11-22 RX ADMIN — GABAPENTIN 600 MG: 300 CAPSULE ORAL at 06:11

## 2017-11-22 RX ADMIN — VANCOMYCIN HYDROCHLORIDE 1250 MG: 100 INJECTION, POWDER, LYOPHILIZED, FOR SOLUTION INTRAVENOUS at 11:11

## 2017-11-22 RX ADMIN — HYDROCODONE BITARTRATE AND ACETAMINOPHEN 1 TABLET: 10; 325 TABLET ORAL at 07:11

## 2017-11-22 RX ADMIN — CEFEPIME 2 G: 2 INJECTION, POWDER, FOR SOLUTION INTRAVENOUS at 04:11

## 2017-11-22 RX ADMIN — HYDROCODONE BITARTRATE AND ACETAMINOPHEN 1 TABLET: 10; 325 TABLET ORAL at 01:11

## 2017-11-22 RX ADMIN — CYCLOBENZAPRINE HYDROCHLORIDE 10 MG: 5 TABLET, FILM COATED ORAL at 02:11

## 2017-11-22 RX ADMIN — HYDROCODONE BITARTRATE AND ACETAMINOPHEN 1 TABLET: 10; 325 TABLET ORAL at 06:11

## 2017-11-22 RX ADMIN — CYCLOBENZAPRINE HYDROCHLORIDE 10 MG: 5 TABLET, FILM COATED ORAL at 11:11

## 2017-11-22 RX ADMIN — CYCLOBENZAPRINE HYDROCHLORIDE 10 MG: 5 TABLET, FILM COATED ORAL at 06:11

## 2017-11-22 RX ADMIN — IBUPROFEN 600 MG: 600 TABLET, FILM COATED ORAL at 01:11

## 2017-11-22 RX ADMIN — Medication 1250 MG: at 06:11

## 2017-11-22 RX ADMIN — IBUPROFEN 600 MG: 600 TABLET, FILM COATED ORAL at 11:11

## 2017-11-22 RX ADMIN — HYDROMORPHONE HYDROCHLORIDE 0.2 MG: 1 INJECTION, SOLUTION INTRAMUSCULAR; INTRAVENOUS; SUBCUTANEOUS at 06:11

## 2017-11-22 NOTE — PLAN OF CARE
Problem: Occupational Therapy Goal  Goal: Occupational Therapy Goal  Goals to be met by: 12/10/17    Patient will increase functional independence with ADLs by performing:    UE Dressing with [place].  LE Dressing with [place].  Grooming while standing at sink with [place].  Toileting from toilet with [place] for hygiene and clothing management.   Bathing from  sitting at sink with [place].     Outcome: Ongoing (interventions implemented as appropriate)  Cont to address goals.    Palak Nobles, NIKHIL  11/22/2017

## 2017-11-22 NOTE — NURSING
RN called to patients bedside by staff. Patient screaming in pain and tearful. Pt states this pain is different than any pain he has had. States that it is nerve pains shooting down his right leg, not spasms. Med 2 paged and notified. One time dose of IV dilaudid 0.2 mg ordered and administered. Flexeril and gabapentin given shortly before. At follow up, pt states pain has slightly decreased but still rated 10/10. Will monitor.

## 2017-11-22 NOTE — PT/OT/SLP PROGRESS
"Physical Therapy  Pt Not Seen       Evgeny Wilde  MRN: [id number]    Patient not seen today secondary to Unavailable (Comment) (attempted to see pt at 10:18 am, however, pt not in the room.  RN (Karol) unsure if pt walking in hallway with OT or "went downstairs for a smoke"). Will follow-up at a later time.    Luz Maria Dumas, PTA   11/22/2017      "

## 2017-11-22 NOTE — PROGRESS NOTES
[facility] Hospital Medicine  Progress Note    Patient Name: Evgeny Wilde  MRN: [id number]  Patient Class: IP- Inpatient   Admission Date: 11/13/2017  Length of Stay: 9 days  Attending Physician: Davian Payne, *  Primary Care Provider: [facility] Medicine Team: [facility] Ron Dubose MD    Subjective:     Principal Problem:Acute bilateral low back pain with bilateral sciatica    HPI:  32 years old male with no significant past medical Hx, present to the ED with progressive lower back pain with sciatica for the last month. He works on constructions, pain start suddenly 1 month ago, at the lower back, dull, aggravated with movements. No inciting factors. Went to [facility] and [facility] ER, he had CT and MRI and they told him he had disk problem and he need neuro follow up and provided with muscle relaxant and narcotic for pain control. Patient stated the medicine is gone and the ain is worse with numbness in his left leg. He is not working for the last month. Denied any trauma, recent infection, surgery, foreign body, recent IV drug abuse. Also denied any fever, mekhi loss, fecal or urine incontinence, saddle anaesthesia.          Hospital Course:  11/14: NSGY following             Blood cultures NGTD, Urine cultures pending.              CT lumbar spine concerning for spondylodiscitis.  11/15: afebrile overnight, blood CX NGTD, NSGY stated no indications for NSGY interventions this admission, they want follow him up as outpatient after after Abx. We will pursue IR for disk Bx/aspirate for Dx.     11/16: NAEON. Vitals stable. Pending disc aspiration via IR tomorrow.  11/17: NAEON. Vitals stable. Planned for L4-L5 biopsy today.  11/18: NAEON. Vitals stable. S/p L4-L5 disc aspiration. Cultures, pathology report pending.  11/19: NAEON. Vitals stable.S/p L4-L5 disc aspiration. Cultures, pathology report pending.  11/20: NAEON. Vitals stable. Pathology showing acute and  chronic OM. Cultures remain negative.  11/21: NAEON. Vitals stable. Patient with trouble sleeping last night with pain in the buttocks and back as well as numbness in the toes. Pt still without adequate pain control.  11/22: NAEON. VSS. Patient with increased pain issues today with pain radiating from the back down to his feet.     Interval History: See hospital course above.    Review of Systems   Constitutional: Negative for chills, fever and unexpected weight change.   HENT: Negative for hearing loss.    Eyes: Negative for visual disturbance.   Respiratory: Negative for shortness of breath.    Cardiovascular: Negative for chest pain.   Gastrointestinal: Negative for abdominal pain, diarrhea, nausea and vomiting.   Genitourinary: Negative for dysuria.   Musculoskeletal: Positive for back pain. Negative for arthralgias.   Skin: Negative for rash.   Neurological: Positive for numbness (toes). Negative for seizures.   Hematological: Negative for adenopathy. Does not bruise/bleed easily.     Objective:     Vital Signs (Most Recent):  Temp: 97.7 °F (36.5 °C) (11/22/17 1149)  Pulse: 96 (11/22/17 1149)  Resp: (!) 22 (11/22/17 0900)  BP: 114/72 (11/22/17 1149)  SpO2: 100 % (11/22/17 1149) Vital Signs (24h Range):  Temp:  [97.4 °F (36.3 °C)-98.5 °F (36.9 °C)] 97.7 °F (36.5 °C)  Pulse:  [] 96  Resp:  [16-22] 22  SpO2:  [98 %-100 %] 100 %  BP: ()/(60-77) 114/72     Weight: 90.2 kg (198 lb 13.7 oz)  Body mass index is 25.53 kg/m².    Intake/Output Summary (Last 24 hours) at 11/22/17 1248  Last data filed at 11/22/17 0600   Gross per 24 hour   Intake              790 ml   Output                0 ml   Net              790 ml      Physical Exam   Constitutional: He appears well-developed and well-nourished.   HENT:   Head: Normocephalic and atraumatic.   Eyes: EOM are normal. Pupils are equal, round, and reactive to light.   Neck: No tracheal deviation present. No thyromegaly present.   Cardiovascular: Normal rate.     No murmur heard.  Pulmonary/Chest: Effort normal and breath sounds normal. He has no wheezes. He has no rales.   Abdominal: There is no tenderness. No hernia.   Musculoskeletal: He exhibits tenderness (lumbar spine and down both legs). He exhibits no edema.   Neurological: A sensory deficit (BL toes.) is present. No cranial nerve deficit. He exhibits normal muscle tone.   Skin: Skin is warm. No rash noted.   Psychiatric: He has a normal mood and affect.       Significant Labs:   CBC:     Recent Labs  Lab 11/22/17  0547   WBC 4.34   HGB 11.2*   HCT 36.0*        CMP:     Recent Labs  Lab 11/22/17  0547      K 4.0      CO2 28   GLU 84   BUN 14   CREATININE 0.8   CALCIUM 9.9   ANIONGAP 10   EGFRNONAA >60.0       Significant Imaging: I have reviewed all pertinent imaging results/findings within the past 24 hours.    Assessment/Plan:      * Acute bilateral low back pain with bilateral sciatica    - Patient with discitis and acute on chronic osteomyelitis of lumbar region- currently increasing pain despite attempts to help with pain.  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 and this makes him very hard to attain relief in    - Symptomatic treatment with new pain regiment- gabapentin 900mg TID, Ibprofen 600mg Q6, flexeril 10mg TID PRN, and PRN Norco 10mg-325mg for break-through pain.   - Will continue to taper opioids as applicable.  - will continue to FU patient's pain and alter meds as needed  - currently in pain              Discitis of lumbar region    - NSGY and ID following, appreciate assistance  - CT lumbar spine concerning for spondylodiscitis at L4-L5, now s/p aspiration with path showing acute and chronic OM, though blood and urine cultures remain negative to date.  - ESR, CRP mildly elevated.  - Procal normal  - HIV Abs negative  - Pain control as described above  - PT/OT following  - Patient on ceftriaxone and vancomycin as ID's recommendation- desire 6-8 weeks of tx- end date Jan 1st at earliest   -  Will plan for follow up in NSGY clinic once ABX are done with MRI w/wo contrast & infectious labs.   - Plan to discharge to LTAC in the interim to complete IV antibiotics given substance abuse history          VTE Risk Mitigation         Ordered     enoxaparin injection 40 mg  Daily     Route:  Subcutaneous        11/20/17 1617     Medium Risk of VTE  Once      11/13/17 1910              Ron Dubose MD  Department of Hospital Medicine   [facility]

## 2017-11-22 NOTE — PT/OT/SLP PROGRESS
"Occupational Therapy  Treatment    Evgeny Wilde   MRN: [id number]   Admitting Diagnosis: Acute bilateral low back pain with bilateral sciatica    OT Date of Treatment: 11/22/17   OT Start Time: 1015  OT Stop Time: 1045  OT Total Time (min): 30 min    Billable Minutes:  Therapeutic Activity 15 and Therapeutic Exercise 15    General Precautions: Standard, fall  Orthopedic Precautions: N/A  Braces: N/A    Do you have any cultural, spiritual, Sabianism conflicts, given your current situation?: none stated    Subjective:  Communicated with RN prior to session.  "I want to smoke a cigarette"  Pain/Comfort  Pain Rating 1: 7/10  Location - Side 1: Bilateral  Location - Orientation 1: lower  Location 1: back  Pain Addressed 1: Distraction, Reposition  Pain Rating Post-Intervention 1: 7/10    Objective:  Patient found with: telemetry     Functional Mobility:  Bed Mobility:  Rolling/Turning to Left: Independent  Rolling/Turning Right: Independent  Scooting/Bridging: Independent  Supine to Sit: Independent  Sit to Supine: Independent    Transfers:   Sit <> Stand Assistance: Supervision  Sit <> Stand Assistive Device: Rolling Walker  Bed <> Chair Transfer Assistance: Stand By Assistance  Bed <> Chair Assistive Device: Rolling Walker    Functional Ambulation: Pt with improved mobility with increasing endurance and flexibility in hips and low back     Activities of Daily Living:  Feeding Level of Assistance: Independent  UE Dressing Level of Assistance: Independent  Toileting Where Assessed: Toilet  Toileting Level of Assistance: Independent     Balance:   Balance within functional limits but pain inhibits positioning and tolerance of resistance.     Therapeutic Activities and Exercises:  Pt educated on safety, effects of smoking on healing, weight shifting, positioning.  Bathroom mobility and toilet and sink access and balance.  Attempted reaching and upper body weight shift with pain too great.      AM-PAC 6 CLICK ADL   How much " "help from another person does this patient currently need?   1 = Unable, Total/Dependent Assistance  2 = A lot, Maximum/Moderate Assistance  3 = A little, Minimum/Contact Guard/Supervision  4 = None, Modified [place] [facility]/Independent    Putting on and taking off regular lower body clothing? : 3  Bathing (including washing, rinsing, drying)?: 2  Toileting, which includes using toilet, bedpan, or urinal? : 4  Putting on and taking off regular upper body clothing?: 4  Taking care of personal grooming such as brushing teeth?: 4  Eating meals?: 4  Total Score: 21     AM-PAC Raw Score CMS "G-Code Modifier Level of Impairment Assistance   6 % Total / Unable   7 - 8 CM 80 - 100% Maximal Assist   9-13 CL 60 - 80% Moderate Assist   14 - 19 CK 40 - 60% Moderate Assist   20 - 22 CJ 20 - 40% Minimal Assist   23 CI 1-20% SBA / CGA   24 CH 0% Independent/ Mod I       Patient left to leave for smoke break despite encouragment not to.      ASSESSMENT:  Evgeny Wilde is a 32 y.o. male with a medical diagnosis of Acute bilateral low back pain with bilateral sciatica and presents with improving functional performance and mobility.  Pt able to demonstrate upper extremity dressing and toileting and approaching goals met and dc from skilled Ot.  Cont skilled OT for max gains    Rehab identified problem list/impairments: Rehab identified problem list/impairments: weakness    Rehab potential is good.    Activity tolerance: Good    Discharge recommendations: Discharge Facility/Level Of Care Needs: home with home health     Barriers to discharge: Barriers to Discharge: Inaccessible home environment    Equipment recommendations: bath bench     GOALS:    Occupational Therapy Goals        Problem: Occupational Therapy Goal    Goal Priority Disciplines Outcome Interventions   Occupational Therapy Goal     OT, PT/OT Ongoing (interventions implemented as appropriate)    Description:  Goals to be met by: 12/10/17    Patient will increase " functional independence with ADLs by performing:    UE Dressing with [place].  LE Dressing with [place].  Grooming while standing at sink with [place].  Toileting from toilet with [place] for hygiene and clothing management.   Bathing from  sitting at sink with [place].                      Plan:  Patient to be seen 3 x/week to address the above listed problems via self-care/home management, therapeutic activities, therapeutic exercises  Plan of Care expires: 12/14/17  Plan of Care reviewed with: patient         Palak Nobles, OT  11/22/2017
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
## 2017-11-22 NOTE — PT/OT/SLP PROGRESS
"Physical Therapy  Treatment/DC Summary    Evgeny Wlide   MRN: [id number]   Admitting Diagnosis: Acute bilateral low back pain with bilateral sciatica    PT Received On: 11/22/17  PT Start Time: 1417     PT Stop Time: 1430    PT Total Time (min): 13 min       Billable Minutes:   Therapeutic Exercise 13 Min    Treatment Type: Treatment  PT/PTA: PT             General Precautions: Standard, fall  Orthopedic Precautions: N/A   Braces:      Do you have any cultural, spiritual, Congregation conflicts, given your current situation?: none stated    Subjective:  Communicated with nurse prior to session.  "I just got back from taking a walk, I doing alright."    Pain/Comfort  Pain Rating 1: 6/10  Location - Side 1: Bilateral  Location - Orientation 1: lower  Location 1: back    Objective:   Patient found with: telemetry    Functional Mobility:  Bed Mobility:   Rolling/Turning to Left: Stand by assistance  Rolling/Turning Right: Stand by assistance  Scooting/Bridging: Stand by Assistance  Supine to Sit: Stand by Assistance  Sit to Supine: Stand by Assistance    Transfers:  Sit <> Stand Assistance: Stand By Assistance  Sit <> Stand Assistive Device: Rolling Walker    Gait:   Gait Distance: Activity did not occur    Balance:   Static Sit: GOOD: Takes MODERATE challenges from all directions  Dynamic Sit: GOOD: Maintains balance through MODERATE excursions of active trunk movement  Static Stand: GOOD-: Takes MODERATE challenges from all directions inconsistently  Dynamic stand: GOOD-: Needs SUPERVISION only during gait and able to self right with moderate      Therapeutic Activities and Exercises:  Patient assisted w/ review of HEP  Patient to receive passive stretching of BLE (piriformis stretch)  Patient to sit EOB performing sciatic nerve glides    AM-PAC 6 CLICK MOBILITY  How much help from another person does this patient currently need?   1 = Unable, Total/Dependent Assistance  2 = A lot, Maximum/Moderate Assistance  3 = A " little, Minimum/Contact Guard/Supervision  4 = None, Modified [place]/Independent    Turning over in bed (including adjusting bedclothes, sheets and blankets)?: 4  Sitting down on and standing up from a chair with arms (e.g., wheelchair, bedside commode, etc.): 3  Moving from lying on back to sitting on the side of the bed?: 3  Moving to and from a bed to a chair (including a wheelchair)?: 3  Need to walk in hospital room?: 3  Climbing 3-5 steps with a railing?: 2  Total Score: 18    AM-PAC Raw Score CMS G-Code Modifier Level of Impairment Assistance   6 % Total / Unable   7 - 9 CM 80 - 100% Maximal Assist   10 - 14 CL 60 - 80% Moderate Assist   15 - 19 CK 40 - 60% Moderate Assist   20 - 22 CJ 20 - 40% Minimal Assist   23 CI 1-20% SBA / CGA   24 CH 0% Independent/ Mod I     Patient left supine with all lines intact and call button in reach.    Assessment:  Evgeny Wilde is a 32 y.o. male with a medical diagnosis of Acute bilateral low back pain with bilateral sciatica and presents with significant symptoms of sciatica. Patient ambulates daily w/o assistance and demonstrates effective execution of HEP. Paitent is appropriate for dc from inpatient services in transition to next level of care.     Rehab identified problem list/impairments: Rehab identified problem list/impairments: weakness    Rehab potential is good.    Activity tolerance: Good    Discharge recommendations: Discharge Facility/Level Of Care Needs: home with home health     Barriers to discharge: Barriers to Discharge: Inaccessible home environment    Equipment recommendations: Equipment Needed After Discharge: bath bench     GOALS:    Physical Therapy Goals     Not on file          Multidisciplinary Problems (Resolved)        Problem: Physical Therapy Goal    Goal Priority Disciplines Outcome Goal Variances Interventions   Physical Therapy Goal   (Resolved)     PT/OT, PT Outcome(s) achieved     Description:  Goals to be met by: 11/25/17      Patient will increase functional independence with mobility by performin. Supine to sit with Set-up [place]-Met  2. Sit to supine with Set-up [place]-Met  3. Rolling to Left and Right with Set-up Assistance.-Met  4. Sit to stand transfer with Supervision-Met  5. Bed to chair transfer with Stand-by Assistance using Rolling Walker-Met  6. Gait  x 50 feet with Supervision using Rolling Walker.-Met  7. Lower extremity exercise program of sciatic nerve glides to address sciatica symptoms.                    PLAN:    Patient to be seen 3 x/week  to address the above listed problems via gait training, therapeutic activities, therapeutic exercises, neuromuscular re-education  Plan of Care expires: 12/15/17  Plan of Care reviewed with: patient         Alex Foreman, PT  2017

## 2017-11-22 NOTE — ASSESSMENT & PLAN NOTE
- Patient with discitis and acute on chronic osteomyelitis of lumbar region- currently increasing pain despite attempts to help with pain.  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 and this makes him very hard to attain relief in    - Symptomatic treatment with new pain regiment- gabapentin 900mg TID, Ibprofen 600mg Q6, flexeril 10mg TID PRN, and PRN Norco 10mg-325mg for break-through pain.   - Will continue to taper opioids as applicable.  - will continue to FU patient's pain and alter meds as needed  - currently in pain

## 2017-11-22 NOTE — PLAN OF CARE
Problem: Patient Care Overview  Goal: Plan of Care Review  No acute changes overnight. Pt AAOx4. Ambulating in room and halls with walker. Free from falls. Pt requested additional pain medication at start of shift. MD paged and pts request denied. PRN medications administered per MAR. Pt noted to be resting comfortably most of shift. Continues on IV abx. Afebrile. Vitals stable. No other issues noted. Will continue to monitor.

## 2017-11-22 NOTE — SUBJECTIVE & OBJECTIVE
Interval History: See hospital course above.    Review of Systems   Constitutional: Negative for chills, fever and unexpected weight change.   HENT: Negative for hearing loss.    Eyes: Negative for visual disturbance.   Respiratory: Negative for shortness of breath.    Cardiovascular: Negative for chest pain.   Gastrointestinal: Negative for abdominal pain, diarrhea, nausea and vomiting.   Genitourinary: Negative for dysuria.   Musculoskeletal: Positive for back pain. Negative for arthralgias.   Skin: Negative for rash.   Neurological: Positive for numbness (toes). Negative for seizures.   Hematological: Negative for adenopathy. Does not bruise/bleed easily.     Objective:     Vital Signs (Most Recent):  Temp: 97.7 °F (36.5 °C) (11/22/17 1149)  Pulse: 96 (11/22/17 1149)  Resp: (!) 22 (11/22/17 0900)  BP: 114/72 (11/22/17 1149)  SpO2: 100 % (11/22/17 1149) Vital Signs (24h Range):  Temp:  [97.4 °F (36.3 °C)-98.5 °F (36.9 °C)] 97.7 °F (36.5 °C)  Pulse:  [] 96  Resp:  [16-22] 22  SpO2:  [98 %-100 %] 100 %  BP: ()/(60-77) 114/72     Weight: 90.2 kg (198 lb 13.7 oz)  Body mass index is 25.53 kg/m².    Intake/Output Summary (Last 24 hours) at 11/22/17 1248  Last data filed at 11/22/17 0600   Gross per 24 hour   Intake              790 ml   Output                0 ml   Net              790 ml      Physical Exam   Constitutional: He appears well-developed and well-nourished.   HENT:   Head: Normocephalic and atraumatic.   Eyes: EOM are normal. Pupils are equal, round, and reactive to light.   Neck: No tracheal deviation present. No thyromegaly present.   Cardiovascular: Normal rate.    No murmur heard.  Pulmonary/Chest: Effort normal and breath sounds normal. He has no wheezes. He has no rales.   Abdominal: There is no tenderness. No hernia.   Musculoskeletal: He exhibits tenderness (lumbar spine and down both legs). He exhibits no edema.   Neurological: A sensory deficit (BL toes.) is present. No cranial nerve  deficit. He exhibits normal muscle tone.   Skin: Skin is warm. No rash noted.   Psychiatric: He has a normal mood and affect.       Significant Labs:   CBC:     Recent Labs  Lab 11/22/17  0547   WBC 4.34   HGB 11.2*   HCT 36.0*        CMP:     Recent Labs  Lab 11/22/17  0547      K 4.0      CO2 28   GLU 84   BUN 14   CREATININE 0.8   CALCIUM 9.9   ANIONGAP 10   EGFRNONAA >60.0       Significant Imaging: I have reviewed all pertinent imaging results/findings within the past 24 hours.

## 2017-11-22 NOTE — PLAN OF CARE
Problem: Physical Therapy Goal  Goal: Physical Therapy Goal  Goals to be met by: 17     Patient will increase functional independence with mobility by performin. Supine to sit with Set-up [place]-Met  2. Sit to supine with Set-up [place]-Met  3. Rolling to Left and Right with Set-up Assistance.-Met  4. Sit to stand transfer with Supervision-Met  5. Bed to chair transfer with Stand-by Assistance using Rolling Walker-Met  6. Gait  x 50 feet with Supervision using Rolling Walker.-Met  7. Lower extremity exercise program of sciatic nerve glides to address sciatica symptoms.   Outcome: Outcome(s) achieved Date Met: 17  Patient has met goals and demonstrates effective execution of HEP addressing sciatica symptoms  Patient appropriate for d/c to next level of care.    Alex Foreman, PT  2017

## 2017-11-23 LAB — VANCOMYCIN TROUGH SERPL-MCNC: 13.1 UG/ML

## 2017-11-23 PROCEDURE — 63600175 PHARM REV CODE 636 W HCPCS: Performed by: INTERNAL MEDICINE

## 2017-11-23 PROCEDURE — 25000003 PHARM REV CODE 250: Performed by: STUDENT IN AN ORGANIZED HEALTH CARE EDUCATION/TRAINING PROGRAM

## 2017-11-23 PROCEDURE — 99233 SBSQ HOSP IP/OBS HIGH 50: CPT | Mod: ,,, | Performed by: HOSPITALIST

## 2017-11-23 PROCEDURE — 20600001 HC STEP DOWN PRIVATE ROOM

## 2017-11-23 PROCEDURE — 36415 COLL VENOUS BLD VENIPUNCTURE: CPT

## 2017-11-23 PROCEDURE — 80202 ASSAY OF VANCOMYCIN: CPT

## 2017-11-23 PROCEDURE — 63600175 PHARM REV CODE 636 W HCPCS: Performed by: STUDENT IN AN ORGANIZED HEALTH CARE EDUCATION/TRAINING PROGRAM

## 2017-11-23 PROCEDURE — 25000003 PHARM REV CODE 250: Performed by: INTERNAL MEDICINE

## 2017-11-23 RX ADMIN — HYDROCODONE BITARTRATE AND ACETAMINOPHEN 1 TABLET: 10; 325 TABLET ORAL at 03:11

## 2017-11-23 RX ADMIN — HYDROCODONE BITARTRATE AND ACETAMINOPHEN 1 TABLET: 10; 325 TABLET ORAL at 11:11

## 2017-11-23 RX ADMIN — Medication 1250 MG: at 03:11

## 2017-11-23 RX ADMIN — IBUPROFEN 600 MG: 600 TABLET, FILM COATED ORAL at 05:11

## 2017-11-23 RX ADMIN — IBUPROFEN 600 MG: 600 TABLET, FILM COATED ORAL at 11:11

## 2017-11-23 RX ADMIN — GABAPENTIN 900 MG: 300 CAPSULE ORAL at 03:11

## 2017-11-23 RX ADMIN — CYCLOBENZAPRINE HYDROCHLORIDE 10 MG: 5 TABLET, FILM COATED ORAL at 07:11

## 2017-11-23 RX ADMIN — CEFEPIME 2 G: 2 INJECTION, POWDER, FOR SOLUTION INTRAVENOUS at 05:11

## 2017-11-23 RX ADMIN — CYCLOBENZAPRINE HYDROCHLORIDE 10 MG: 5 TABLET, FILM COATED ORAL at 03:11

## 2017-11-23 RX ADMIN — GABAPENTIN 900 MG: 300 CAPSULE ORAL at 08:11

## 2017-11-23 RX ADMIN — Medication 1250 MG: at 07:11

## 2017-11-23 RX ADMIN — IBUPROFEN 600 MG: 600 TABLET, FILM COATED ORAL at 06:11

## 2017-11-23 RX ADMIN — HYDROCODONE BITARTRATE AND ACETAMINOPHEN 1 TABLET: 10; 325 TABLET ORAL at 09:11

## 2017-11-23 RX ADMIN — STANDARDIZED SENNA CONCENTRATE AND DOCUSATE SODIUM 1 TABLET: 8.6; 5 TABLET, FILM COATED ORAL at 08:11

## 2017-11-23 RX ADMIN — Medication 1250 MG: at 11:11

## 2017-11-23 RX ADMIN — ENOXAPARIN SODIUM 40 MG: 100 INJECTION SUBCUTANEOUS at 05:11

## 2017-11-23 RX ADMIN — GABAPENTIN 900 MG: 300 CAPSULE ORAL at 06:11

## 2017-11-23 NOTE — ASSESSMENT & PLAN NOTE
32 year old male with no significant past medical history who presented to the ED with progressive lower back pain with sciatica for the last month.  Pain started suddenly 1 month ago, at the lower back, dull, aggravated with movements. No inciting factors. He went to [facility] ER, he had CT and MRI and they told him he had disk problem and he need neuro follow up and provided with muscle relaxant and narcotic for pain control.  He ran out of pain medication, pain became worse with numbness in left leg and he [place] [facility] [place]   Denied any trauma, recent infection, surgery, foreign body.  He does have history of IVDU and urine tox is + for benzos, cocaine, THC.    No associated fevers, chills.  No saddle anesthesia, bowel or bladder incontinence.      CT lumbar spine here howed endplate destruction of L4/5 concerning for osteodiscitis.   Seen by Neurosurgery who recommends no acute surgical intervention, but  follow up after initial conservative intervention with antibiotics.   IR aspiration on 11/17 remains culture negative to date, but pathology reports acute and chronic osteomyelitis.  Blood cultures negative.   ESR 55, CRP 9.7, procalcitonin wnl.  HIV negative. Remains  hemodynamically and neurologically stable.  Vanc trough low and dose increased to 1.25g IV q8h.     -  Given demonstrated acute/chronic osteomyelitis on Path, recommend empiric treatment with IV vancomycin at current dosing and ceftriaxone 2 grams IV q 24 hours.    -  Vancomycin trough before each 3rd dose until trough therapeutically stable 15-20 then weekly.  Adjust to maintain trough 15-20   -  Will need minimum of 6 weeks of IV antibiotics.    -  Weekly (on Mondays) cbc, cmp, crp, esr and vancomycin trough and fax results to ID at [phone]   -  If going to LTAC - recommend ID consult at LTAC    -  Follow up with ID 14 days.    -  Understand that in order to complete IV antibiotics will need to go to LTAC for inpatient  therapy.  Discussed briefly with patient and advised best course of therapy is IV.  However, if patient determines he does not wish to proceed with IV could consider oral ciprofloxacin and doxycycline which we have explained would not be optimal.     -  Will sign off    Patient seen by, and plan discussed with, ID staff  Discussed with Primary Team

## 2017-11-23 NOTE — SUBJECTIVE & OBJECTIVE
Interval History: No AEON.  Afebrile and WBC WNL.  Blood and disc cultures NG.  Back pain significant. The patient denies any recent fever, chills, or sweats.    Review of Systems  Objective:     Vital Signs (Most Recent):  Temp: 97.8 °F (36.6 °C) (11/22/17 1944)  Pulse: 92 (11/22/17 1944)  Resp: 20 (11/22/17 1944)  BP: 122/73 (11/22/17 1944)  SpO2: 99 % (11/22/17 1944) Vital Signs (24h Range):  Temp:  [97.4 °F (36.3 °C)-98.5 °F (36.9 °C)] 97.8 °F (36.6 °C)  Pulse:  [] 92  Resp:  [18-22] 20  SpO2:  [98 %-100 %] 99 %  BP: ()/(60-74) 122/73     Weight: 90.2 kg (198 lb 13.7 oz)  Body mass index is 25.53 kg/m².    Estimated Creatinine Clearance: 152.7 mL/min (based on SCr of 0.8 mg/dL).    Physical Exam   Constitutional: He is oriented to person, place, and time. He appears well-developed and well-nourished. No distress.       HENT:   Head: Normocephalic.   Eyes: Conjunctivae are normal. No scleral icterus.   Neck: Normal range of motion. Neck supple.   Cardiovascular: Normal rate, regular rhythm and normal heart sounds.  Exam reveals no friction rub.    No murmur heard.  Pulmonary/Chest: Effort normal and breath sounds normal. No respiratory distress.   Abdominal: Soft. There is no tenderness.   Musculoskeletal: He exhibits no edema.   Neurological: He is alert and oriented to person, place, and time.   Moves all extremities.     Skin: Skin is warm and dry. No rash noted. He is not diaphoretic.   Multiple tattoos.    Psychiatric: He has a normal mood and affect. His behavior is normal.   Vitals reviewed.      Significant Labs:   Blood Culture:     Recent Labs  Lab 11/13/17  1842   LABBLOO No growth after 5 days.  No growth after 5 days.     CBC:     Recent Labs  Lab 11/22/17  0547   WBC 4.34   HGB 11.2*   HCT 36.0*        CMP:     Recent Labs  Lab 11/22/17  0547      K 4.0      CO2 28   GLU 84   BUN 14   CREATININE 0.8   CALCIUM 9.9   ANIONGAP 10   EGFRNONAA >60.0     Wound Culture:      Recent Labs  Lab 11/17/17  0949   LABAERO No growth     All pertinent labs within the past 24 hours have been reviewed.    Significant Imaging: I have reviewed all pertinent imaging results/findings within the past 24 hours.   IR Aspiration Intervertebral Discs [[id number]] Resulted: 11/17/17 1734   Order Status: Completed Updated: 11/17/17 1735   Narrative:     History: Suspected diskitis at L4-5.    Physician: Ricky Hooks    CPT code number is 64[address], 27405.    Moderate sedation was administered by independent registered nurse.  Total sedation time is 30 minutes.    Informed consent was obtained.  Patient was placed in the prone position on the angiography table.  Using sterile technique, 1% lidocaine local anesthesia, and fluoroscopic guidance, a 11 gauge biopsy needle was advanced from a left posterior lateral approach into the disk space.  Tip of the needle was positioned within the disk space.  Aspiration and biopsy specimens were obtained from the disk space, and these were sent to the lab for cultures.  10 cc of serosanguineous fluid and multiple small disk fragments obtained and sent to the lab. Radiation dose was 332 mGy.   Impression:          Successful fluoroscopic guided L4-5 disk aspiration  ______________________________________     Electronically signed by resident: Zeke García  Date: 11/17/17  Time: 16:15            As the supervising and teaching physician, I personally reviewed the images and resident's interpretation and I agree with the findings.          Electronically signed by: LINDSEY HOOKS MD  Date: 11/17/17  Time: 17:34

## 2017-11-23 NOTE — SUBJECTIVE & OBJECTIVE
Interval History: See hospital course above.    Review of Systems   Constitutional: Negative for chills, fever and unexpected weight change.   HENT: Negative for hearing loss.    Eyes: Negative for visual disturbance.   Respiratory: Negative for shortness of breath.    Cardiovascular: Negative for chest pain.   Gastrointestinal: Negative for abdominal pain, diarrhea, nausea and vomiting.   Genitourinary: Negative for dysuria.   Musculoskeletal: Positive for back pain. Negative for arthralgias.   Skin: Negative for rash.   Neurological: Positive for numbness (toes). Negative for seizures.   Hematological: Negative for adenopathy. Does not bruise/bleed easily.     Objective:     Vital Signs (Most Recent):  Temp: 98.5 °F (36.9 °C) (11/23/17 1141)  Pulse: 91 (11/23/17 1141)  Resp: 18 (11/23/17 0823)  BP: (!) 117/56 (11/23/17 1141)  SpO2: 100 % (11/23/17 1141) Vital Signs (24h Range):  Temp:  [96.7 °F (35.9 °C)-98.5 °F (36.9 °C)] 98.5 °F (36.9 °C)  Pulse:  [82-92] 91  Resp:  [18-20] 18  SpO2:  [98 %-100 %] 100 %  BP: ()/(56-79) 117/56     Weight: 90.2 kg (198 lb 13.7 oz)  Body mass index is 25.53 kg/m².    Intake/Output Summary (Last 24 hours) at 11/23/17 1307  Last data filed at 11/23/17 0400   Gross per 24 hour   Intake              240 ml   Output                0 ml   Net              240 ml      Physical Exam   Constitutional: He appears well-developed and well-nourished.   HENT:   Head: Normocephalic and atraumatic.   Eyes: EOM are normal. Pupils are equal, round, and reactive to light.   Neck: No tracheal deviation present. No thyromegaly present.   Cardiovascular: Normal rate.    No murmur heard.  Pulmonary/Chest: Effort normal and breath sounds normal. He has no wheezes. He has no rales.   Abdominal: There is no tenderness. No hernia.   Musculoskeletal: He exhibits tenderness (lumbar spine and down both legs). He exhibits no edema.   Neurological: A sensory deficit (BL toes.) is present. No cranial nerve  deficit. He exhibits normal muscle tone.   Skin: Skin is warm. No rash noted.   Psychiatric: He has a normal mood and affect.       Significant Labs:   CBC:     Recent Labs  Lab 11/22/17  0547   WBC 4.34   HGB 11.2*   HCT 36.0*        CMP:     Recent Labs  Lab 11/22/17  0547      K 4.0      CO2 28   GLU 84   BUN 14   CREATININE 0.8   CALCIUM 9.9   ANIONGAP 10   EGFRNONAA >60.0       Significant Imaging: I have reviewed all pertinent imaging results/findings within the past 24 hours.
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
## 2017-11-23 NOTE — PROGRESS NOTES
[facility] Hospital Medicine  Progress Note    Patient Name: Evgeny Wilde  MRN: [id number]  Patient Class: IP- Inpatient   Admission Date: 11/13/2017  Length of Stay: 10 days  Attending Physician: Davian Payne, *  Primary Care Provider: Primary Doctor [facility] Medicine [place] [facility] Ron Dubose MD    Subjective:     Principal Problem:Acute bilateral low back pain with bilateral sciatica    HPI:  32 years old male with no significant past medical Hx, present to the ED with progressive lower back pain with sciatica for the last month. He works on constructions, pain start suddenly 1 month ago, at the lower back, dull, aggravated with movements. No inciting factors. Went to [facility] ER, he had CT and MRI and they told him he had disk problem and he need neuro follow up and provided with muscle relaxant and narcotic for pain control. Patient stated the medicine is gone and the ain is worse with numbness in his left leg. He is not working for the last month. Denied any trauma, recent infection, surgery, foreign body, recent IV drug abuse. Also denied any fever, mekhi loss, fecal or urine incontinence, saddle anaesthesia.          Hospital Course:  11/14: NSGY following             Blood cultures NGTD, Urine cultures pending.              CT lumbar spine concerning for spondylodiscitis.  11/15: afebrile overnight, blood CX NGTD, NSGY stated no indications for NSGY interventions this admission, they want follow him up as outpatient after after Abx. We will pursue IR for disk Bx/aspirate for Dx.     11/16: NAEON. Vitals stable. Pending disc aspiration via IR tomorrow.  11/17: NAEON. Vitals stable. Planned for L4-L5 biopsy today.  11/18: NAEON. Vitals stable. S/p L4-L5 disc aspiration. Cultures, pathology report pending.  11/19: NAEON. Vitals stable.S/p L4-L5 disc aspiration. Cultures, pathology report pending.  11/20: NAEON. Vitals stable. Pathology showing acute and  chronic OM. Cultures remain negative.  11/21: NAEON. Vitals stable. Patient with trouble sleeping last night with pain in the buttocks and back as well as numbness in the toes. Pt still without adequate pain control.  11/22: NAEON. VSS. Patient with increased pain issues today with pain radiating from the back down to his feet.   11/22: NAEON. VSS. Patient with better pain control today.    Interval History: See hospital course above.    Review of Systems   Constitutional: Negative for chills, fever and unexpected weight change.   HENT: Negative for hearing loss.    Eyes: Negative for visual disturbance.   Respiratory: Negative for shortness of breath.    Cardiovascular: Negative for chest pain.   Gastrointestinal: Negative for abdominal pain, diarrhea, nausea and vomiting.   Genitourinary: Negative for dysuria.   Musculoskeletal: Positive for back pain. Negative for arthralgias.   Skin: Negative for rash.   Neurological: Positive for numbness (toes). Negative for seizures.   Hematological: Negative for adenopathy. Does not bruise/bleed easily.     Objective:     Vital Signs (Most Recent):  Temp: 98.5 °F (36.9 °C) (11/23/17 1141)  Pulse: 91 (11/23/17 1141)  Resp: 18 (11/23/17 0823)  BP: (!) 117/56 (11/23/17 1141)  SpO2: 100 % (11/23/17 1141) Vital Signs (24h Range):  Temp:  [96.7 °F (35.9 °C)-98.5 °F (36.9 °C)] 98.5 °F (36.9 °C)  Pulse:  [82-92] 91  Resp:  [18-20] 18  SpO2:  [98 %-100 %] 100 %  BP: ()/(56-79) 117/56     Weight: 90.2 kg (198 lb 13.7 oz)  Body mass index is 25.53 kg/m².    Intake/Output Summary (Last 24 hours) at 11/23/17 1307  Last data filed at 11/23/17 0400   Gross per 24 hour   Intake              240 ml   Output                0 ml   Net              240 ml      Physical Exam   Constitutional: He appears well-developed and well-nourished.   HENT:   Head: Normocephalic and atraumatic.   Eyes: EOM are normal. Pupils are equal, round, and reactive to light.   Neck: No tracheal deviation  present. No thyromegaly present.   Cardiovascular: Normal rate.    No murmur heard.  Pulmonary/Chest: Effort normal and breath sounds normal. He has no wheezes. He has no rales.   Abdominal: There is no tenderness. No hernia.   Musculoskeletal: He exhibits tenderness (lumbar spine and down both legs). He exhibits no edema.   Neurological: A sensory deficit (BL toes.) is present. No cranial nerve deficit. He exhibits normal muscle tone.   Skin: Skin is warm. No rash noted.   Psychiatric: He has a normal mood and affect.       Significant Labs:   CBC:     Recent Labs  Lab 11/22/17  0547   WBC 4.34   HGB 11.2*   HCT 36.0*        CMP:     Recent Labs  Lab 11/22/17  0547      K 4.0      CO2 28   GLU 84   BUN 14   CREATININE 0.8   CALCIUM 9.9   ANIONGAP 10   EGFRNONAA >60.0       Significant Imaging: I have reviewed all pertinent imaging results/findings within the past 24 hours.    Assessment/Plan:      * Acute bilateral low back pain with bilateral sciatica    - Patient with discitis and acute on chronic osteomyelitis of lumbar region- currently increasing pain despite attempts to help with pain.  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 and this makes him very hard to attain relief in    - Symptomatic treatment with new pain regiment- gabapentin 900mg TID, Ibprofen 600mg Q6, flexeril 10mg TID PRN, and PRN Norco 10mg-325mg for break-through pain.   - currently patient is feeling good and without much pain              Discitis of lumbar region    - NSGY and ID following, appreciate assistance  - CT lumbar spine concerning for spondylodiscitis at L4-L5, now s/p aspiration with path showing acute and chronic OM, though blood and urine cultures remain negative to date.  - ESR, CRP mildly elevated.  - Procal normal  - HIV Abs negative  - Pain control as described above  - PT/OT following  - Patient on ceftriaxone and vancomycin as ID's recommendation- desire 6-8 weeks of tx- end date Jan 1st at earliest   - Will plan  for follow up in NSGY clinic once ABX are done with MRI w/wo contrast & infectious labs.   - Plan to discharge to LTAC in the interim to complete IV antibiotics given substance abuse history          VTE Risk Mitigation         Ordered     enoxaparin injection 40 mg  Daily     Route:  Subcutaneous        11/20/17 1617     Medium Risk of VTE  Once      11/13/17 1910              Ron Dubose MD  Department of Hospital Medicine   [facility]

## 2017-11-23 NOTE — PLAN OF CARE
Problem: Patient Care Overview  Goal: Plan of Care Review  Outcome: Ongoing (interventions implemented as appropriate)  Pt resting calmly in bed, rating pain at 6/10.  Ambulated frequently in hallways and off floor to smoke.  IV antibiotics continued.  Safety measures maintained, call light within reach, no further needs assessed at this time.

## 2017-11-23 NOTE — ASSESSMENT & PLAN NOTE
- Patient with discitis and acute on chronic osteomyelitis of lumbar region- currently increasing pain despite attempts to help with pain.  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 and this makes him very hard to attain relief in    - Symptomatic treatment with new pain regiment- gabapentin 900mg TID, Ibprofen 600mg Q6, flexeril 10mg TID PRN, and PRN Norco 10mg-325mg for break-through pain.   - currently patient is feeling good and without much pain

## 2017-11-23 NOTE — PLAN OF CARE
Problem: Patient Care Overview  Goal: Plan of Care Review  Pt resting in bed, family visiting. Pt up ad nae in room, ambulates frequently with FWW in hallways, also goes off floor to smoke without difficulty, Ivantibiotics continued. Moderate pain relief with scheduled and PRN medications. Safety measures maintained, call light within reach. No further needs assessed.

## 2017-11-23 NOTE — PLAN OF CARE
Problem: Patient Care Overview  Goal: Plan of Care Review  Outcome: Ongoing (interventions implemented as appropriate)  No acute changes overnight. Pain controlled with flexeril and Norco. No falls or injury during shift. Pt did leave floor a few times for smoke breaks. Call light in reach. Will continue to monitor.

## 2017-11-24 LAB
ANION GAP SERPL CALC-SCNC: 11 MMOL/L
BACTERIA SPEC ANAEROBE CULT: NORMAL
BASOPHILS # BLD AUTO: 0.02 K/UL
BASOPHILS NFR BLD: 0.3 %
BUN SERPL-MCNC: 14 MG/DL
CALCIUM SERPL-MCNC: 9.6 MG/DL
CHLORIDE SERPL-SCNC: 105 MMOL/L
CO2 SERPL-SCNC: 24 MMOL/L
CREAT SERPL-MCNC: 0.9 MG/DL
DIFFERENTIAL METHOD: ABNORMAL
EOSINOPHIL # BLD AUTO: 0.1 K/UL
EOSINOPHIL NFR BLD: 1.6 %
ERYTHROCYTE [DISTWIDTH] IN BLOOD BY AUTOMATED COUNT: 14.5 %
EST. GFR  (AFRICAN AMERICAN): >60 ML/MIN/1.73 M^2
EST. GFR  (NON AFRICAN AMERICAN): >60 ML/MIN/1.73 M^2
GLUCOSE SERPL-MCNC: 75 MG/DL
HCT VFR BLD AUTO: 36.6 %
HGB BLD-MCNC: 11.1 G/DL
IMM GRANULOCYTES # BLD AUTO: 0.02 K/UL
IMM GRANULOCYTES NFR BLD AUTO: 0.3 %
LYMPHOCYTES # BLD AUTO: 2.7 K/UL
LYMPHOCYTES NFR BLD: 46.3 %
MCH RBC QN AUTO: 26.2 PG
MCHC RBC AUTO-ENTMCNC: 30.3 G/DL
MCV RBC AUTO: 86 FL
MONOCYTES # BLD AUTO: 0.9 K/UL
MONOCYTES NFR BLD: 15.3 %
NEUTROPHILS # BLD AUTO: 2.1 K/UL
NEUTROPHILS NFR BLD: 36.2 %
NRBC BLD-RTO: 0 /100 WBC
PLATELET # BLD AUTO: 273 K/UL
PMV BLD AUTO: 9.7 FL
POTASSIUM SERPL-SCNC: 4.4 MMOL/L
RBC # BLD AUTO: 4.24 M/UL
SODIUM SERPL-SCNC: 140 MMOL/L
WBC # BLD AUTO: 5.75 K/UL

## 2017-11-24 PROCEDURE — 25000003 PHARM REV CODE 250: Performed by: STUDENT IN AN ORGANIZED HEALTH CARE EDUCATION/TRAINING PROGRAM

## 2017-11-24 PROCEDURE — 25000003 PHARM REV CODE 250: Performed by: INTERNAL MEDICINE

## 2017-11-24 PROCEDURE — 97530 THERAPEUTIC ACTIVITIES: CPT

## 2017-11-24 PROCEDURE — 97802 MEDICAL NUTRITION INDIV IN: CPT

## 2017-11-24 PROCEDURE — 20600001 HC STEP DOWN PRIVATE ROOM

## 2017-11-24 PROCEDURE — 80048 BASIC METABOLIC PNL TOTAL CA: CPT

## 2017-11-24 PROCEDURE — 85025 COMPLETE CBC W/AUTO DIFF WBC: CPT

## 2017-11-24 PROCEDURE — 63600175 PHARM REV CODE 636 W HCPCS: Performed by: STUDENT IN AN ORGANIZED HEALTH CARE EDUCATION/TRAINING PROGRAM

## 2017-11-24 PROCEDURE — 36415 COLL VENOUS BLD VENIPUNCTURE: CPT

## 2017-11-24 PROCEDURE — 99233 SBSQ HOSP IP/OBS HIGH 50: CPT | Mod: ,,, | Performed by: HOSPITALIST

## 2017-11-24 PROCEDURE — 63600175 PHARM REV CODE 636 W HCPCS: Performed by: INTERNAL MEDICINE

## 2017-11-24 RX ORDER — IBUPROFEN 600 MG/1
600 TABLET ORAL EVERY 6 HOURS PRN
Status: DISCONTINUED | OUTPATIENT
Start: 2017-11-24 | End: 2017-12-19 | Stop reason: HOSPADM

## 2017-11-24 RX ADMIN — CYCLOBENZAPRINE HYDROCHLORIDE 10 MG: 5 TABLET, FILM COATED ORAL at 03:11

## 2017-11-24 RX ADMIN — HYDROCODONE BITARTRATE AND ACETAMINOPHEN 1 TABLET: 10; 325 TABLET ORAL at 03:11

## 2017-11-24 RX ADMIN — HYDROCODONE BITARTRATE AND ACETAMINOPHEN 1 TABLET: 10; 325 TABLET ORAL at 09:11

## 2017-11-24 RX ADMIN — ENOXAPARIN SODIUM 40 MG: 100 INJECTION SUBCUTANEOUS at 06:11

## 2017-11-24 RX ADMIN — HYDROCODONE BITARTRATE AND ACETAMINOPHEN 1 TABLET: 10; 325 TABLET ORAL at 05:11

## 2017-11-24 RX ADMIN — GABAPENTIN 900 MG: 300 CAPSULE ORAL at 09:11

## 2017-11-24 RX ADMIN — IBUPROFEN 600 MG: 200 TABLET, FILM COATED ORAL at 06:11

## 2017-11-24 RX ADMIN — CYCLOBENZAPRINE HYDROCHLORIDE 10 MG: 5 TABLET, FILM COATED ORAL at 12:11

## 2017-11-24 RX ADMIN — IBUPROFEN 600 MG: 600 TABLET, FILM COATED ORAL at 05:11

## 2017-11-24 RX ADMIN — Medication 1500 MG: at 08:11

## 2017-11-24 RX ADMIN — Medication 1250 MG: at 03:11

## 2017-11-24 RX ADMIN — GABAPENTIN 900 MG: 300 CAPSULE ORAL at 03:11

## 2017-11-24 RX ADMIN — CEFEPIME 2 G: 2 INJECTION, POWDER, FOR SOLUTION INTRAVENOUS at 06:11

## 2017-11-24 RX ADMIN — IBUPROFEN 600 MG: 200 TABLET, FILM COATED ORAL at 12:11

## 2017-11-24 RX ADMIN — VANCOMYCIN HYDROCHLORIDE 1750 MG: 100 INJECTION, POWDER, LYOPHILIZED, FOR SOLUTION INTRAVENOUS at 12:11

## 2017-11-24 RX ADMIN — GABAPENTIN 900 MG: 300 CAPSULE ORAL at 05:11

## 2017-11-24 NOTE — PROGRESS NOTES
[facility] Hospital Medicine  Progress Note    Patient Name: Evgeny Wilde  MRN: [id number]  Patient Class: IP- Inpatient   Admission Date: 11/13/2017  Length of Stay: 11 days  Attending Physician: Davian Payne, *  Primary Care Provider: Primary Doctor [facility] Medicine Team: [facility] RAPID RESPONSE Ron Dubose MD    Subjective:     Principal Problem:Acute bilateral low back pain with bilateral sciatica    HPI:  32 years old male with no significant past medical Hx, present to the ED with progressive lower back pain with sciatica for the last month. He works on constructions, pain start suddenly 1 month ago, at the lower back, dull, aggravated with movements. No inciting factors. Went to [facility] and [facility] ER, he had CT and MRI and they told him he had disk problem and he need neuro follow up and provided with muscle relaxant and narcotic for pain control. Patient stated the medicine is gone and the ain is worse with numbness in his left leg. He is not working for the last month. Denied any trauma, recent infection, surgery, foreign body, recent IV drug abuse. Also denied any fever, mekhi loss, fecal or urine incontinence, saddle anaesthesia.          Hospital Course:  11/14: NSGY following             Blood cultures NGTD, Urine cultures pending.              CT lumbar spine concerning for spondylodiscitis.  11/15: afebrile overnight, blood CX NGTD, NSGY stated no indications for NSGY interventions this admission, they want follow him up as outpatient after after Abx. We will pursue IR for disk Bx/aspirate for Dx.     11/16: NAEON. Vitals stable. Pending disc aspiration via IR tomorrow.  11/17: NAEON. Vitals stable. Planned for L4-L5 biopsy today.  11/18: NAEON. Vitals stable. S/p L4-L5 disc aspiration. Cultures, pathology report pending.  11/19: NAEON. Vitals stable.S/p L4-L5 disc aspiration. Cultures, pathology report pending.  11/20: NAEON. Vitals stable. Pathology showing acute and  chronic OM. Cultures remain negative.  11/21: NAEON. Vitals stable. Patient with trouble sleeping last night with pain in the buttocks and back as well as numbness in the toes. Pt still without adequate pain control.  11/22: NAEON. VSS. Patient with increased pain issues today with pain radiating from the back down to his feet.   11/22: NAEON. VSS. Patient with better pain control today.  11/23: NAEON. VSS. Patient with better pain control today.  11/24:     Interval History: See hospital course above.    Review of Systems   Constitutional: Negative for chills, fever and unexpected weight change.   HENT: Negative for hearing loss.    Eyes: Negative for visual disturbance.   Respiratory: Negative for shortness of breath.    Cardiovascular: Negative for chest pain.   Gastrointestinal: Negative for abdominal pain, diarrhea, nausea and vomiting.   Genitourinary: Negative for dysuria.   Musculoskeletal: Positive for back pain. Negative for arthralgias.   Skin: Negative for rash.   Neurological: Positive for numbness (toes). Negative for seizures.   Hematological: Negative for adenopathy. Does not bruise/bleed easily.     Objective:     Vital Signs (Most Recent):  Temp: 97.9 °F (36.6 °C) (11/24/17 0822)  Pulse: (!) 112 (11/24/17 0822)  Resp: 16 (11/24/17 0822)  BP: 114/63 (11/24/17 0822)  SpO2: 100 % (11/24/17 0822) Vital Signs (24h Range):  Temp:  [97.8 °F (36.6 °C)-98.5 °F (36.9 °C)] 97.9 °F (36.6 °C)  Pulse:  [] 112  Resp:  [16-18] 16  SpO2:  [98 %-100 %] 100 %  BP: (114-131)/(56-78) 114/63     Weight: 90.2 kg (198 lb 13.7 oz)  Body mass index is 25.53 kg/m².    Intake/Output Summary (Last 24 hours) at 11/24/17 0854  Last data filed at 11/24/17 0400   Gross per 24 hour   Intake              730 ml   Output                0 ml   Net              730 ml      Physical Exam   Constitutional: He appears well-developed and well-nourished.   HENT:   Head: Normocephalic and atraumatic.   Eyes: EOM are normal. Pupils are  equal, round, and reactive to light.   Neck: No tracheal deviation present. No thyromegaly present.   Cardiovascular: Normal rate.    No murmur heard.  Pulmonary/Chest: Effort normal and breath sounds normal. He has no wheezes. He has no rales.   Abdominal: There is no tenderness. No hernia.   Musculoskeletal: He exhibits tenderness (lumbar spine and down both legs). He exhibits no edema.   Neurological: A sensory deficit (BL toes.) is present. No cranial nerve deficit. He exhibits normal muscle tone.   Skin: Skin is warm. No rash noted.   Psychiatric: He has a normal mood and affect.       Significant Labs:   CBC:     Recent Labs  Lab 11/24/17  0343   WBC 5.75   HGB 11.1*   HCT 36.6*        CMP:     Recent Labs  Lab 11/24/17  0343      K 4.4      CO2 24   GLU 75   BUN 14   CREATININE 0.9   CALCIUM 9.6   ANIONGAP 11   EGFRNONAA >60.0       Significant Imaging: I have reviewed all pertinent imaging results/findings within the past 24 hours.    Assessment/Plan:      * Acute bilateral low back pain with bilateral sciatica    - Patient with discitis and acute on chronic osteomyelitis of lumbar region- currently increasing pain despite attempts to help with pain.  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 and this makes him very hard to attain relief in    - Symptomatic treatment with new pain regiment- gabapentin 900mg TID, Ibprofen 600mg Q6, flexeril 10mg TID PRN, and PRN Norco 10mg-325mg for break-through pain.   - currently patient is feeling good and without much pain              Discitis of lumbar region    - NSGY and ID following, appreciate assistance  - CT lumbar spine concerning for spondylodiscitis at L4-L5, now s/p aspiration with path showing acute and chronic OM, though blood and urine cultures remain negative to date.  - ESR, CRP mildly elevated.  - Procal normal  - HIV Abs negative  - Pain control as described above  - PT/OT following  - Patient on ceftriaxone and vancomycin as ID's recommendation-  desire 6-8 weeks of tx- end date Jan 1st at earliest   - Will plan for follow up in NSGY clinic once ABX are done with MRI w/wo contrast & infectious labs.   - Plan to discharge to LTAC in the interim to complete IV antibiotics given substance abuse history          VTE Risk Mitigation         Ordered     enoxaparin injection 40 mg  Daily     Route:  Subcutaneous        11/20/17 1617     Medium Risk of VTE  Once      11/13/17 1910              Ron Dubose MD  Department of Hospital Medicine   [facility]

## 2017-11-24 NOTE — PLAN OF CARE
Rajesh informed liaison with [facility] that Pt is stable and ready to d/c Monday if insurance approves. Rajesh to follow.

## 2017-11-24 NOTE — PLAN OF CARE
Problem: Occupational Therapy Goal  Goal: Occupational Therapy Goal  Goals to be met by: 12/10/17    Patient will increase functional independence with ADLs by performing:    UE Dressing with [place]. MET 11/24/17  LE Dressing with [place].   Grooming while standing at sink with [place]. MET 11/24/17  Toileting from toilet with [place] for hygiene and clothing management. MET 11/24/17  Bathing from  sitting at sink with [place].     Outcome: Ongoing (interventions implemented as appropriate)  Goals partially met and pt preparing for Dc.  Palak Nobles, OT  11/24/2017

## 2017-11-24 NOTE — SUBJECTIVE & OBJECTIVE
Interval History: See hospital course above.    Review of Systems   Constitutional: Negative for chills, fever and unexpected weight change.   HENT: Negative for hearing loss.    Eyes: Negative for visual disturbance.   Respiratory: Negative for shortness of breath.    Cardiovascular: Negative for chest pain.   Gastrointestinal: Negative for abdominal pain, diarrhea, nausea and vomiting.   Genitourinary: Negative for dysuria.   Musculoskeletal: Positive for back pain. Negative for arthralgias.   Skin: Negative for rash.   Neurological: Positive for numbness (toes). Negative for seizures.   Hematological: Negative for adenopathy. Does not bruise/bleed easily.     Objective:     Vital Signs (Most Recent):  Temp: 97.9 °F (36.6 °C) (11/24/17 0822)  Pulse: (!) 112 (11/24/17 0822)  Resp: 16 (11/24/17 0822)  BP: 114/63 (11/24/17 0822)  SpO2: 100 % (11/24/17 0822) Vital Signs (24h Range):  Temp:  [97.8 °F (36.6 °C)-98.5 °F (36.9 °C)] 97.9 °F (36.6 °C)  Pulse:  [] 112  Resp:  [16-18] 16  SpO2:  [98 %-100 %] 100 %  BP: (114-131)/(56-78) 114/63     Weight: 90.2 kg (198 lb 13.7 oz)  Body mass index is 25.53 kg/m².    Intake/Output Summary (Last 24 hours) at 11/24/17 0854  Last data filed at 11/24/17 0400   Gross per 24 hour   Intake              730 ml   Output                0 ml   Net              730 ml      Physical Exam   Constitutional: He appears well-developed and well-nourished.   HENT:   Head: Normocephalic and atraumatic.   Eyes: EOM are normal. Pupils are equal, round, and reactive to light.   Neck: No tracheal deviation present. No thyromegaly present.   Cardiovascular: Normal rate.    No murmur heard.  Pulmonary/Chest: Effort normal and breath sounds normal. He has no wheezes. He has no rales.   Abdominal: There is no tenderness. No hernia.   Musculoskeletal: He exhibits tenderness (lumbar spine and down both legs). He exhibits no edema.   Neurological: A sensory deficit (BL toes.) is present. No cranial  nerve deficit. He exhibits normal muscle tone.   Skin: Skin is warm. No rash noted.   Psychiatric: He has a normal mood and affect.       Significant Labs:   CBC:     Recent Labs  Lab 11/24/17  0343   WBC 5.75   HGB 11.1*   HCT 36.6*        CMP:     Recent Labs  Lab 11/24/17  0343      K 4.4      CO2 24   GLU 75   BUN 14   CREATININE 0.9   CALCIUM 9.6   ANIONGAP 11   EGFRNONAA >60.0       Significant Imaging: I have reviewed all pertinent imaging results/findings within the past 24 hours.

## 2017-11-24 NOTE — PLAN OF CARE
Problem: Patient Care Overview  Goal: Plan of Care Review  Outcome: Ongoing (interventions implemented as appropriate)  Pt resting in bed, up adlib off floor to smoke, ambulates well with FWW.  Pain well controlled on Scheduled and PRN medications, Ivantibiotics continued, pt pending SNF placement, possibly Monday, plan of care reviewed with pt, safety measures maintained, call light within reach, no further needs assessed.

## 2017-11-24 NOTE — PLAN OF CARE
Problem: Patient Care Overview  Goal: Plan of Care Review  Outcome: Ongoing (interventions implemented as appropriate)  No changes overnight. Administered IV abx as ordered. Vanc trough therapeutic. Call light in reach. Will continue to monitor.

## 2017-11-24 NOTE — PROGRESS NOTES
"  [facility]  Adult Nutrition  Consult Note    SUMMARY     Recommendations    1. Continue current regular diet.   2. RD to monitor & follow-up.    Goals: PO intake >50%  Nutrition Goal Status: new  Communication of RD Recs: reviewed with RN    Reason for Assessment    Reason for Assessment: length of stay  Diagnosis: other (see comments) (Back pain)  Relevent Medical History: No sign. PMH   Interdisciplinary Rounds: did not attend     General Information Comments: Pt with good appetite, consuming 100% of meals.  Nutrition Discharge Planning: Adequate PO intake    Nutrition Prescription Ordered    Current Diet Order: Regular    Nutrition/Diet History    Patient Reported Diet/Restrictions/Preferences: general     Factors Affecting Nutritional Intake: other (see comments) (None)    Labs/Tests/Procedures/Meds    Pertinent Labs Reviewed: reviewed, pertinent  Pertinent Labs Comments: Stable  Pertinent Medications Reviewed: reviewed, pertinent    Physical Findings    Overall Physical Appearance: nourished  Oral/Mouth Cavity: WDL  Skin: other (see comments) (Incision - back)    Anthropometrics    Height: 6' 2" (188 cm)  Weight Method: Bed Scale  Weight: 90.2 kg (198 lb 13.7 oz)    Ideal Body Weight (IBW), Male: 190 lb  % Ideal Body Weight, Male (lb): 104.66 lb     BMI (Calculated): 25.6  BMI Grade: 25 - 29.9 - overweight    Assessment and Plan    No nutritional dx at this time.    Monitor and Evaluation    Food and Nutrient Intake: energy intake, food and beverage intake  Food and Nutrient Adminstration: diet order     Physical Activity and Function: nutrition-related ADLs and IADLs  Anthropometric Measurements: weight, weight change  Biochemical Data, Medical Tests and Procedures: lipid profile, gastrointestinal profile, glucose/endocrine profile, inflammatory profile, electrolyte and renal panel  Nutrition-Focused Physical Findings: overall appearance    Nutrition Risk    Level of Risk: other (see " comments) (1x/week)    Nutrition Follow-Up    RD Follow-up?: Yes

## 2017-11-24 NOTE — PT/OT/SLP PROGRESS
"Occupational Therapy  Treatment    Evgeny Wilde   MRN: [id number]   Admitting Diagnosis: Acute bilateral low back pain with bilateral sciatica    OT Date of Treatment: 11/24/17   OT Start Time: 0945  OT Stop Time: 1000  OT Total Time (min): 15 min    Billable Minutes:  Therapeutic Activity 15`    General Precautions: Standard, fall  Orthopedic Precautions: N/A  Braces: N/A    Do you have any cultural, spiritual, Mormonism conflicts, given your current situation?: none stated    Subjective:  Communicated with RN prior to session.  "nella been doing ok.  My family visited yesterday."  Pain/Comfort  Pain Rating 1: 6/10  Location - Side 1: Bilateral  Location - Orientation 1: lower  Location 1: back  Pain Addressed 1: Distraction, Reposition    Objective:  Patient found with: telemetry     Functional Mobility:  Bed Mobility:  Rolling/Turning to Left: Independent  Rolling/Turning Right: Independent  Scooting/Bridging: Independent  Supine to Sit: Independent  Sit to Supine: Independent    Transfers:   Sit <> Stand Assistance: Supervision  Sit <> Stand Assistive Device: Rolling Walker    Functional Ambulation: Pt with safe and effective mobility with rolling walker in hallway and to outside to smoke (despite ed regarding effects of smoking on healing)    Activities of Daily Living:    Continued effort for lower extremity dressing with awkward controlled movement due to back pain and attempts to decrease pain and avoid painful movements    Balance:   Static Sit: NORMAL: No deviations seen in posture held statically  Dynamic Sit: NORMAL: No deviations seen in posture held dynamically  Static Stand: NORMAL: No deviations seen in posture held statically  Dynamic stand: NORMAL: No deviations seen in posture held dynamically    Therapeutic Activities and Exercises:  Pt educated on smoking cessation and effects on healing, safety, increased ADL participation.  Toilet transfer and bed mobility with SBA.  Pt preparing for DC     AM-PAC " "6 CLICK ADL   How much help from another person does this patient currently need?   1 = Unable, Total/Dependent Assistance  2 = A lot, Maximum/Moderate Assistance  3 = A little, Minimum/Contact Guard/Supervision  4 = None, Modified [place]/Independent    Putting on and taking off regular lower body clothing? : 3  Bathing (including washing, rinsing, drying)?: 2 (lower extremity)  Toileting, which includes using toilet, bedpan, or urinal? : 4  Putting on and taking off regular upper body clothing?: 4  Taking care of personal grooming such as brushing teeth?: 4  Eating meals?: 4  Total Score: 21     AM-PAC Raw Score CMS "G-Code Modifier Level of Impairment Assistance   6 % Total / Unable   7 - 8 CM 80 - 100% Maximal Assist   9-13 CL 60 - 80% Moderate Assist   14 - 19 CK 40 - 60% Moderate Assist   20 - 22 CJ 20 - 40% Minimal Assist   23 CI 1-20% SBA / CGA   24 CH 0% Independent/ Mod I       Patient left supine with all lines intact and call button in reach    ASSESSMENT:  Evgeny Wilde is a 32 y.o. male with a medical diagnosis of Acute bilateral low back pain with bilateral sciatica and presents with improving self care and mobility.  Pt preparing for DC and with overall I basic self care.  Lower extremity dressing with extra time and very min A.  Pt to continue skilled OT for max functional gains. Pt continues with pain despite functional gains made.       Rehab identified problem list/impairments: Rehab identified problem list/impairments: weakness    Rehab potential is good.    Activity tolerance: Good    Discharge recommendations: Discharge Facility/Level Of Care Needs: home with home health     Barriers to discharge: Barriers to Discharge: Inaccessible home environment    Equipment recommendations: bath bench     GOALS:    Occupational Therapy Goals        Problem: Occupational Therapy Goal    Goal Priority Disciplines Outcome Interventions   Occupational Therapy Goal     OT, PT/OT Ongoing " (interventions implemented as appropriate)    Description:  Goals to be met by: 12/10/17    Patient will increase functional independence with ADLs by performing:    UE Dressing with [place]. MET 11/24/17  LE Dressing with [place].   Grooming while standing at sink with [place]. MET 11/24/17  Toileting from toilet with [place] for hygiene and clothing management. MET 11/24/17  Bathing from  sitting at sink with [place].                       Plan:  Patient to be seen 3 x/week to address the above listed problems via self-care/home management, therapeutic activities, therapeutic exercises  Plan of Care expires: 12/14/17  Plan of Care reviewed with: patient         Palak Nobles, OT  11/24/2017

## 2017-11-25 LAB — VANCOMYCIN TROUGH SERPL-MCNC: 11.2 UG/ML

## 2017-11-25 PROCEDURE — 25000003 PHARM REV CODE 250: Performed by: STUDENT IN AN ORGANIZED HEALTH CARE EDUCATION/TRAINING PROGRAM

## 2017-11-25 PROCEDURE — 63600175 PHARM REV CODE 636 W HCPCS: Performed by: INTERNAL MEDICINE

## 2017-11-25 PROCEDURE — 25000003 PHARM REV CODE 250: Performed by: INTERNAL MEDICINE

## 2017-11-25 PROCEDURE — 80202 ASSAY OF VANCOMYCIN: CPT

## 2017-11-25 PROCEDURE — 20600001 HC STEP DOWN PRIVATE ROOM

## 2017-11-25 PROCEDURE — 99233 SBSQ HOSP IP/OBS HIGH 50: CPT | Mod: ,,, | Performed by: HOSPITALIST

## 2017-11-25 PROCEDURE — 36415 COLL VENOUS BLD VENIPUNCTURE: CPT

## 2017-11-25 RX ORDER — HYDROCODONE BITARTRATE AND ACETAMINOPHEN 10; 325 MG/1; MG/1
1 TABLET ORAL EVERY 4 HOURS PRN
Status: DISCONTINUED | OUTPATIENT
Start: 2017-11-25 | End: 2017-12-19 | Stop reason: HOSPADM

## 2017-11-25 RX ADMIN — CYCLOBENZAPRINE HYDROCHLORIDE 10 MG: 5 TABLET, FILM COATED ORAL at 05:11

## 2017-11-25 RX ADMIN — CYCLOBENZAPRINE HYDROCHLORIDE 10 MG: 5 TABLET, FILM COATED ORAL at 11:11

## 2017-11-25 RX ADMIN — GABAPENTIN 900 MG: 300 CAPSULE ORAL at 03:11

## 2017-11-25 RX ADMIN — Medication 1500 MG: at 12:11

## 2017-11-25 RX ADMIN — Medication 1500 MG: at 11:11

## 2017-11-25 RX ADMIN — HYDROCODONE BITARTRATE AND ACETAMINOPHEN 1 TABLET: 10; 325 TABLET ORAL at 11:11

## 2017-11-25 RX ADMIN — Medication 1500 MG: at 05:11

## 2017-11-25 RX ADMIN — GABAPENTIN 900 MG: 300 CAPSULE ORAL at 11:11

## 2017-11-25 RX ADMIN — STANDARDIZED SENNA CONCENTRATE AND DOCUSATE SODIUM 1 TABLET: 8.6; 5 TABLET, FILM COATED ORAL at 11:11

## 2017-11-25 RX ADMIN — GABAPENTIN 900 MG: 300 CAPSULE ORAL at 05:11

## 2017-11-25 RX ADMIN — CEFEPIME 2 G: 2 INJECTION, POWDER, FOR SOLUTION INTRAVENOUS at 04:11

## 2017-11-25 RX ADMIN — IBUPROFEN 600 MG: 200 TABLET, FILM COATED ORAL at 12:11

## 2017-11-25 RX ADMIN — CYCLOBENZAPRINE HYDROCHLORIDE 10 MG: 5 TABLET, FILM COATED ORAL at 12:11

## 2017-11-25 RX ADMIN — IBUPROFEN 600 MG: 200 TABLET, FILM COATED ORAL at 05:11

## 2017-11-25 RX ADMIN — ENOXAPARIN SODIUM 40 MG: 100 INJECTION SUBCUTANEOUS at 04:11

## 2017-11-25 RX ADMIN — HYDROCODONE BITARTRATE AND ACETAMINOPHEN 1 TABLET: 10; 325 TABLET ORAL at 04:11

## 2017-11-25 RX ADMIN — HYDROCODONE BITARTRATE AND ACETAMINOPHEN 1 TABLET: 10; 325 TABLET ORAL at 10:11

## 2017-11-25 NOTE — SUBJECTIVE & OBJECTIVE
Interval History: See hospital course above.    Review of Systems   Constitutional: Negative for chills, fever and unexpected weight change.   HENT: Negative for hearing loss.    Eyes: Negative for visual disturbance.   Respiratory: Negative for shortness of breath.    Cardiovascular: Negative for chest pain.   Gastrointestinal: Negative for abdominal pain, diarrhea, nausea and vomiting.   Genitourinary: Negative for dysuria.   Musculoskeletal: Positive for back pain. Negative for arthralgias.   Skin: Negative for rash.   Neurological: Positive for numbness (toes). Negative for seizures.   Hematological: Negative for adenopathy. Does not bruise/bleed easily.     Objective:     Vital Signs (Most Recent):  Temp: 98.7 °F (37.1 °C) (11/25/17 0851)  Pulse: 75 (11/25/17 0851)  Resp: 18 (11/25/17 0851)  BP: 136/66 (11/25/17 0851)  SpO2: 100 % (11/25/17 0851) Vital Signs (24h Range):  Temp:  [97.8 °F (36.6 °C)-98.7 °F (37.1 °C)] 98.7 °F (37.1 °C)  Pulse:  [75-91] 75  Resp:  [15-18] 18  SpO2:  [99 %-100 %] 100 %  BP: (117-136)/(58-79) 136/66     Weight: 90.2 kg (198 lb 13.7 oz)  Body mass index is 25.53 kg/m².    Intake/Output Summary (Last 24 hours) at 11/25/17 0959  Last data filed at 11/25/17 0600   Gross per 24 hour   Intake              600 ml   Output                0 ml   Net              600 ml      Physical Exam   Constitutional: He appears well-developed and well-nourished.   HENT:   Head: Normocephalic and atraumatic.   Eyes: EOM are normal. Pupils are equal, round, and reactive to light.   Neck: No tracheal deviation present. No thyromegaly present.   Cardiovascular: Normal rate.    No murmur heard.  Pulmonary/Chest: Effort normal and breath sounds normal. He has no wheezes. He has no rales.   Abdominal: There is no tenderness. No hernia.   Musculoskeletal: He exhibits tenderness (lumbar spine and down both legs). He exhibits no edema.   Neurological: A sensory deficit (BL toes.) is present. No cranial nerve  deficit. He exhibits normal muscle tone.   Skin: Skin is warm. No rash noted.   Psychiatric: He has a normal mood and affect.       Significant Labs:   CBC:     Recent Labs  Lab 11/24/17  0343   WBC 5.75   HGB 11.1*   HCT 36.6*        CMP:     Recent Labs  Lab 11/24/17  0343      K 4.4      CO2 24   GLU 75   BUN 14   CREATININE 0.9   CALCIUM 9.6   ANIONGAP 11   EGFRNONAA >60.0       Significant Imaging: I have reviewed all pertinent imaging results/findings within the past 24 hours.

## 2017-11-25 NOTE — PROGRESS NOTES
[facility] Hospital Medicine  Progress Note    Patient Name: Evgeny Wilde  MRN: [id number]  Patient Class: IP- Inpatient   Admission Date: 11/13/2017  Length of Stay: 12 days  Attending Physician: Davian Payne, *  Primary Care Provider: Primary Doctor [facility] Medicine Team: [facility] RAPID RESPONSE Ron Dubose MD    Subjective:     Principal Problem:Acute bilateral low back pain with bilateral sciatica    HPI:  32 years old male with no significant past medical Hx, present to the ED with progressive lower back pain with sciatica for the last month. He works on constructions, pain start suddenly 1 month ago, at the lower back, dull, aggravated with movements. No inciting factors. Went to [facility] and [facility] ER, he had CT and MRI and they told him he had disk problem and he need neuro follow up and provided with muscle relaxant and narcotic for pain control. Patient stated the medicine is gone and the ain is worse with numbness in his left leg. He is not working for the last month. Denied any trauma, recent infection, surgery, foreign body, recent IV drug abuse. Also denied any fever, mekhi loss, fecal or urine incontinence, saddle anaesthesia.          Hospital Course:  11/14: NSGY following             Blood cultures NGTD, Urine cultures pending.              CT lumbar spine concerning for spondylodiscitis.  11/15: afebrile overnight, blood CX NGTD, NSGY stated no indications for NSGY interventions this admission, they want follow him up as outpatient after after Abx. We will pursue IR for disk Bx/aspirate for Dx.     11/16: NAEON. Vitals stable. Pending disc aspiration via IR tomorrow.  11/17: NAEON. Vitals stable. Planned for L4-L5 biopsy today.  11/18: NAEON. Vitals stable. S/p L4-L5 disc aspiration. Cultures, pathology report pending.  11/19: NAEON. Vitals stable.S/p L4-L5 disc aspiration. Cultures, pathology report pending.  11/20: NAEON. Vitals stable. Pathology showing acute and  chronic OM. Cultures remain negative.  11/21: NAEON. Vitals stable. Patient with trouble sleeping last night with pain in the buttocks and back as well as numbness in the toes. Pt still without adequate pain control.  11/22: NAEON. VSS. Patient with increased pain issues today with pain radiating from the back down to his feet.   11/22: NAEON. VSS. Patient with better pain control today.  11/23: NAEON. VSS. Patient with better pain control today.  11/24: NAEON. VSS. Patient asking for increased frequency of pain meds today.    Interval History: See hospital course above.    Review of Systems   Constitutional: Negative for chills, fever and unexpected weight change.   HENT: Negative for hearing loss.    Eyes: Negative for visual disturbance.   Respiratory: Negative for shortness of breath.    Cardiovascular: Negative for chest pain.   Gastrointestinal: Negative for abdominal pain, diarrhea, nausea and vomiting.   Genitourinary: Negative for dysuria.   Musculoskeletal: Positive for back pain. Negative for arthralgias.   Skin: Negative for rash.   Neurological: Positive for numbness (toes). Negative for seizures.   Hematological: Negative for adenopathy. Does not bruise/bleed easily.     Objective:     Vital Signs (Most Recent):  Temp: 98.7 °F (37.1 °C) (11/25/17 0851)  Pulse: 75 (11/25/17 0851)  Resp: 18 (11/25/17 0851)  BP: 136/66 (11/25/17 0851)  SpO2: 100 % (11/25/17 0851) Vital Signs (24h Range):  Temp:  [97.8 °F (36.6 °C)-98.7 °F (37.1 °C)] 98.7 °F (37.1 °C)  Pulse:  [75-91] 75  Resp:  [15-18] 18  SpO2:  [99 %-100 %] 100 %  BP: (117-136)/(58-79) 136/66     Weight: 90.2 kg (198 lb 13.7 oz)  Body mass index is 25.53 kg/m².    Intake/Output Summary (Last 24 hours) at 11/25/17 0959  Last data filed at 11/25/17 0600   Gross per 24 hour   Intake              600 ml   Output                0 ml   Net              600 ml      Physical Exam   Constitutional: He appears well-developed and well-nourished.   HENT:   Head:  Normocephalic and atraumatic.   Eyes: EOM are normal. Pupils are equal, round, and reactive to light.   Neck: No tracheal deviation present. No thyromegaly present.   Cardiovascular: Normal rate.    No murmur heard.  Pulmonary/Chest: Effort normal and breath sounds normal. He has no wheezes. He has no rales.   Abdominal: There is no tenderness. No hernia.   Musculoskeletal: He exhibits tenderness (lumbar spine and down both legs). He exhibits no edema.   Neurological: A sensory deficit (BL toes.) is present. No cranial nerve deficit. He exhibits normal muscle tone.   Skin: Skin is warm. No rash noted.   Psychiatric: He has a normal mood and affect.       Significant Labs:   CBC:     Recent Labs  Lab 11/24/17  0343   WBC 5.75   HGB 11.1*   HCT 36.6*        CMP:     Recent Labs  Lab 11/24/17  0343      K 4.4      CO2 24   GLU 75   BUN 14   CREATININE 0.9   CALCIUM 9.6   ANIONGAP 11   EGFRNONAA >60.0       Significant Imaging: I have reviewed all pertinent imaging results/findings within the past 24 hours.    Assessment/Plan:      * Acute bilateral low back pain with bilateral sciatica    - Patient with discitis and acute on chronic osteomyelitis of lumbar region- currently increasing pain despite attempts to help with pain.  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 and this makes him very hard to attain relief in    - Symptomatic treatment with new pain regiment- gabapentin 900mg TID, Ibprofen 600mg Q6, flexeril 10mg TID PRN, and PRN Norco 10mg-325mg for break-through pain.   - currently patient is feeling good and without much pain              Discitis of lumbar region    - NSGY and ID following, appreciate assistance  - CT lumbar spine concerning for spondylodiscitis at L4-L5, now s/p aspiration with path showing acute and chronic OM, though blood and urine cultures remain negative to date.  - ESR, CRP mildly elevated.  - Procal normal  - HIV Abs negative  - Pain control as described above  - PT/OT  following  - Patient on ceftriaxone and vancomycin as ID's recommendation- desire 6-8 weeks of tx- end date Jan 1st at earliest   - Will plan for follow up in NSGY clinic once ABX are done with MRI w/wo contrast & infectious labs.   - Plan to discharge to LTAC in the interim to complete IV antibiotics given substance abuse history          VTE Risk Mitigation         Ordered     enoxaparin injection 40 mg  Daily     Route:  Subcutaneous        11/20/17 1617     Medium Risk of VTE  Once      11/13/17 1910              Ron Dubose MD  Department of Hospital Medicine   [facility]

## 2017-11-25 NOTE — PLAN OF CARE
Problem: Patient Care Overview  Goal: Plan of Care Review  Outcome: Ongoing (interventions implemented as appropriate)  Pt remains free from falls. Walker at bedside. Pt refuses to lower bed at the lowest level. Pain controlled with po pain medication. No events over night.

## 2017-11-26 PROBLEM — F19.11 HISTORY OF SUBSTANCE ABUSE: Status: ACTIVE | Noted: 2017-11-26

## 2017-11-26 LAB
ANION GAP SERPL CALC-SCNC: 10 MMOL/L
BASOPHILS # BLD AUTO: 0.02 K/UL
BASOPHILS NFR BLD: 0.3 %
BUN SERPL-MCNC: 12 MG/DL
CALCIUM SERPL-MCNC: 9.6 MG/DL
CHLORIDE SERPL-SCNC: 104 MMOL/L
CO2 SERPL-SCNC: 27 MMOL/L
CREAT SERPL-MCNC: 0.8 MG/DL
DIFFERENTIAL METHOD: ABNORMAL
EOSINOPHIL # BLD AUTO: 0.1 K/UL
EOSINOPHIL NFR BLD: 1.1 %
ERYTHROCYTE [DISTWIDTH] IN BLOOD BY AUTOMATED COUNT: 14.6 %
EST. GFR  (AFRICAN AMERICAN): >60 ML/MIN/1.73 M^2
EST. GFR  (NON AFRICAN AMERICAN): >60 ML/MIN/1.73 M^2
GLUCOSE SERPL-MCNC: 93 MG/DL
HCT VFR BLD AUTO: 33.3 %
HGB BLD-MCNC: 10.6 G/DL
IMM GRANULOCYTES # BLD AUTO: 0.03 K/UL
IMM GRANULOCYTES NFR BLD AUTO: 0.5 %
LYMPHOCYTES # BLD AUTO: 2 K/UL
LYMPHOCYTES NFR BLD: 32.1 %
MCH RBC QN AUTO: 26.8 PG
MCHC RBC AUTO-ENTMCNC: 31.8 G/DL
MCV RBC AUTO: 84 FL
MONOCYTES # BLD AUTO: 0.9 K/UL
MONOCYTES NFR BLD: 13.4 %
NEUTROPHILS # BLD AUTO: 3.3 K/UL
NEUTROPHILS NFR BLD: 52.6 %
NRBC BLD-RTO: 0 /100 WBC
PLATELET # BLD AUTO: 274 K/UL
PMV BLD AUTO: 10 FL
POTASSIUM SERPL-SCNC: 3.9 MMOL/L
RBC # BLD AUTO: 3.95 M/UL
SODIUM SERPL-SCNC: 141 MMOL/L
VANCOMYCIN SERPL-MCNC: 11 UG/ML
WBC # BLD AUTO: 6.33 K/UL

## 2017-11-26 PROCEDURE — 63600175 PHARM REV CODE 636 W HCPCS: Performed by: INTERNAL MEDICINE

## 2017-11-26 PROCEDURE — 25000003 PHARM REV CODE 250: Performed by: INTERNAL MEDICINE

## 2017-11-26 PROCEDURE — 25000003 PHARM REV CODE 250: Performed by: STUDENT IN AN ORGANIZED HEALTH CARE EDUCATION/TRAINING PROGRAM

## 2017-11-26 PROCEDURE — 80202 ASSAY OF VANCOMYCIN: CPT

## 2017-11-26 PROCEDURE — 99233 SBSQ HOSP IP/OBS HIGH 50: CPT | Mod: ,,, | Performed by: HOSPITALIST

## 2017-11-26 PROCEDURE — 20600001 HC STEP DOWN PRIVATE ROOM

## 2017-11-26 PROCEDURE — 80048 BASIC METABOLIC PNL TOTAL CA: CPT

## 2017-11-26 PROCEDURE — 36415 COLL VENOUS BLD VENIPUNCTURE: CPT

## 2017-11-26 PROCEDURE — 85025 COMPLETE CBC W/AUTO DIFF WBC: CPT

## 2017-11-26 RX ADMIN — VANCOMYCIN HYDROCHLORIDE 1750 MG: 10 INJECTION, POWDER, LYOPHILIZED, FOR SOLUTION INTRAVENOUS at 02:11

## 2017-11-26 RX ADMIN — HYDROCODONE BITARTRATE AND ACETAMINOPHEN 1 TABLET: 10; 325 TABLET ORAL at 02:11

## 2017-11-26 RX ADMIN — GABAPENTIN 900 MG: 300 CAPSULE ORAL at 05:11

## 2017-11-26 RX ADMIN — CYCLOBENZAPRINE HYDROCHLORIDE 10 MG: 5 TABLET, FILM COATED ORAL at 02:11

## 2017-11-26 RX ADMIN — STANDARDIZED SENNA CONCENTRATE AND DOCUSATE SODIUM 1 TABLET: 8.6; 5 TABLET, FILM COATED ORAL at 10:11

## 2017-11-26 RX ADMIN — VANCOMYCIN HYDROCHLORIDE 1750 MG: 10 INJECTION, POWDER, LYOPHILIZED, FOR SOLUTION INTRAVENOUS at 10:11

## 2017-11-26 RX ADMIN — HYDROCODONE BITARTRATE AND ACETAMINOPHEN 1 TABLET: 10; 325 TABLET ORAL at 10:11

## 2017-11-26 RX ADMIN — GABAPENTIN 900 MG: 300 CAPSULE ORAL at 02:11

## 2017-11-26 RX ADMIN — ENOXAPARIN SODIUM 40 MG: 100 INJECTION SUBCUTANEOUS at 05:11

## 2017-11-26 RX ADMIN — CYCLOBENZAPRINE HYDROCHLORIDE 10 MG: 5 TABLET, FILM COATED ORAL at 08:11

## 2017-11-26 RX ADMIN — CEFEPIME 2 G: 2 INJECTION, POWDER, FOR SOLUTION INTRAVENOUS at 06:11

## 2017-11-26 RX ADMIN — CYCLOBENZAPRINE HYDROCHLORIDE 10 MG: 5 TABLET, FILM COATED ORAL at 10:11

## 2017-11-26 RX ADMIN — IBUPROFEN 600 MG: 200 TABLET, FILM COATED ORAL at 08:11

## 2017-11-26 RX ADMIN — HYDROCODONE BITARTRATE AND ACETAMINOPHEN 1 TABLET: 10; 325 TABLET ORAL at 05:11

## 2017-11-26 RX ADMIN — GABAPENTIN 900 MG: 300 CAPSULE ORAL at 10:11

## 2017-11-26 NOTE — ASSESSMENT & PLAN NOTE
- Stable  - NSGY and ID following, appreciate assistance  - CT lumbar spine concerning for spondylodiscitis at L4-L5, now s/p aspiration with path showing acute and chronic OM, though blood and urine cultures remain negative to date.  - ESR, CRP mildly elevated earlier this admission.   - Procal normal  - HIV Abs negative  - Pain control as described above  - PT/OT following  - Patient on ceftriaxone and vancomycin as ID's recommendation- desire 6-8 weeks of tx- end date Jan 1st at earliest   - Will plan for follow up in NSGY clinic once ABX are done with MRI w/wo contrast & infectious labs.   - Plan to discharge to LTAC in the interim to complete IV antibiotics given substance abuse history  - Increasing vancomycin dose based on trough levels

## 2017-11-26 NOTE — ASSESSMENT & PLAN NOTE
- Stable  - Patient with discitis and acute on chronic osteomyelitis of lumbar region. Pain mostly unchanged from yesterday.  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, which complicates our discharge planning and pain control regimen  - Symptomatic treatment with new pain regiment- gabapentin 900mg TID, Ibprofen 600mg Q6, flexeril 10mg TID PRN, and PRN Norco 10mg-325mg for break-through pain.

## 2017-11-26 NOTE — SUBJECTIVE & OBJECTIVE
Interval History:     Mr. Wilde felt well this morning, resting comfortably in bed. No acute events overnight or new complaints. His vancomycin trough from yesterday was again subtherapeutic, for which we have increased his vancomycin to 1750mg Q8H.    Review of Systems   Constitutional: Negative for chills, fever and unexpected weight change.   HENT: Negative for hearing loss and sore throat.    Eyes: Negative for redness and visual disturbance.   Respiratory: Negative for cough and shortness of breath.    Cardiovascular: Negative for chest pain and palpitations.   Gastrointestinal: Negative for abdominal pain, diarrhea, nausea and vomiting.   Endocrine: Positive for polyuria. Negative for polydipsia.   Genitourinary: Negative for dysuria and flank pain.   Musculoskeletal: Positive for back pain. Negative for arthralgias.   Skin: Negative for rash and wound.   Neurological: Positive for numbness (toes). Negative for seizures.   Hematological: Negative for adenopathy. Does not bruise/bleed easily.   Psychiatric/Behavioral: Negative for agitation and confusion.     Objective:     Vital Signs (Most Recent):  Temp: 98.6 °F (37 °C) (11/26/17 1148)  Pulse: 94 (11/26/17 1148)  Resp: 18 (11/26/17 1148)  BP: 131/63 (11/26/17 1148)  SpO2: 98 % (11/26/17 1148) Vital Signs (24h Range):  Temp:  [97.3 °F (36.3 °C)-98.7 °F (37.1 °C)] 98.6 °F (37 °C)  Pulse:  [] 94  Resp:  [18-20] 18  SpO2:  [96 %-100 %] 98 %  BP: (117-131)/(63-73) 131/63     Weight: 90.2 kg (198 lb 13.7 oz)  Body mass index is 25.53 kg/m².    Intake/Output Summary (Last 24 hours) at 11/26/17 1419  Last data filed at 11/25/17 1800   Gross per 24 hour   Intake              240 ml   Output                0 ml   Net              240 ml      Physical Exam   Constitutional: He is oriented to person, place, and time. He appears well-developed and well-nourished. No distress.   HENT:   Head: Normocephalic and atraumatic.   Eyes: EOM are normal. Pupils are equal,  round, and reactive to light.   Neck: Normal range of motion. Neck supple. No JVD present.   Cardiovascular: Normal rate and regular rhythm.  Exam reveals no gallop and no friction rub.    No murmur heard.  Pulmonary/Chest: Effort normal and breath sounds normal. No respiratory distress. He has no wheezes.   Abdominal: Soft. Bowel sounds are normal. He exhibits no distension and no mass. There is no tenderness.   Musculoskeletal: Normal range of motion. He exhibits tenderness (lumbar back pain). He exhibits no edema.   Neurological: He is alert and oriented to person, place, and time. He displays normal reflexes. A sensory deficit (bilateral toes) is present. No cranial nerve deficit.   Skin: Skin is warm and dry. No rash noted. No erythema.   Psychiatric: He has a normal mood and affect. His behavior is normal.       Significant Labs:   CBC:    Recent Labs  Lab 11/26/17  0440   WBC 6.33   GRAN 52.6  3.3   HGB 10.6*   HCT 33.3*          Chem 10:    Recent Labs  Lab 11/26/17  0440      K 3.9      CO2 27   BUN 12   CREATININE 0.8   GLU 93   CALCIUM 9.6       Significant Imaging:   No new imaging

## 2017-11-26 NOTE — PROGRESS NOTES
[facility] Hospital Medicine  Progress Note    Patient Name: Evgeny Wilde  MRN: [id number]  Patient Class: IP- Inpatient   Admission Date: 11/13/2017  Length of Stay: 13 days  Attending Physician: Davian Payne, *  Primary Care Provider: Primary Doctor [facility] Medicine Team: [facility] [place] RAPID RESPONSE Uriah Perez MD    Subjective:     Principal Problem:Acute bilateral low back pain with bilateral sciatica    HPI:  32 years old male with no significant past medical Hx, present to the ED with progressive lower back pain with sciatica for the last month. He works on constructions, pain start suddenly 1 month ago, at the lower back, dull, aggravated with movements. No inciting factors. Went to [facility] and [facility] ER, he had CT and MRI and they told him he had disk problem and he need neuro follow up and provided with muscle relaxant and narcotic for pain control. Patient stated the medicine is gone and the ain is worse with numbness in his left leg. He is not working for the last month. Denied any trauma, recent infection, surgery, foreign body, recent IV drug abuse. Also denied any fever, mekhi loss, fecal or urine incontinence, saddle anaesthesia.          Hospital Course:  11/14: NSGY following             Blood cultures NGTD, Urine cultures pending.              CT lumbar spine concerning for spondylodiscitis.  11/15: afebrile overnight, blood CX NGTD, NSGY stated no indications for NSGY interventions this admission, they want follow him up as outpatient after after Abx. We will pursue IR for disk Bx/aspirate for Dx.     11/16: NAEON. Vitals stable. Pending disc aspiration via IR tomorrow.  11/17: NAEON. Vitals stable. Planned for L4-L5 biopsy today.  11/18: NAEON. Vitals stable. S/p L4-L5 disc aspiration. Cultures, pathology report pending.  11/19: NAEON. Vitals stable.S/p L4-L5 disc aspiration. Cultures, pathology report pending.  11/20: NAEON. Vitals stable. Pathology showing acute  and chronic OM. Cultures remain negative.  11/21: NAEON. Vitals stable. Patient with trouble sleeping last night with pain in the buttocks and back as well as numbness in the toes. Pt still without adequate pain control.  11/22: NAEON. VSS. Patient with increased pain issues today with pain radiating from the back down to his feet.   11/22: NAEON. VSS. Patient with better pain control today.  11/23: NAEON. VSS. Patient with better pain control today.  11/24: NAEON. VSS. Patient asking for increased frequency of pain meds today.    Interval History:     Mr. Wilde felt well this morning, resting comfortably in bed. No acute events overnight or new complaints. His vancomycin trough from yesterday was again subtherapeutic, for which we have increased his vancomycin to 1750mg Q8H.    Review of Systems   Constitutional: Negative for chills, fever and unexpected weight change.   HENT: Negative for hearing loss and sore throat.    Eyes: Negative for redness and visual disturbance.   Respiratory: Negative for cough and shortness of breath.    Cardiovascular: Negative for chest pain and palpitations.   Gastrointestinal: Negative for abdominal pain, diarrhea, nausea and vomiting.   Endocrine: Positive for polyuria. Negative for polydipsia.   Genitourinary: Negative for dysuria and flank pain.   Musculoskeletal: Positive for back pain. Negative for arthralgias.   Skin: Negative for rash and wound.   Neurological: Positive for numbness (toes). Negative for seizures.   Hematological: Negative for adenopathy. Does not bruise/bleed easily.   Psychiatric/Behavioral: Negative for agitation and confusion.     Objective:     Vital Signs (Most Recent):  Temp: 98.6 °F (37 °C) (11/26/17 1148)  Pulse: 94 (11/26/17 1148)  Resp: 18 (11/26/17 1148)  BP: 131/63 (11/26/17 1148)  SpO2: 98 % (11/26/17 1148) Vital Signs (24h Range):  Temp:  [97.3 °F (36.3 °C)-98.7 °F (37.1 °C)] 98.6 °F (37 °C)  Pulse:  [] 94  Resp:  [18-20] 18  SpO2:  [96  %-100 %] 98 %  BP: (117-131)/(63-73) 131/63     Weight: 90.2 kg (198 lb 13.7 oz)  Body mass index is 25.53 kg/m².    Intake/Output Summary (Last 24 hours) at 11/26/17 1419  Last data filed at 11/25/17 1800   Gross per 24 hour   Intake              240 ml   Output                0 ml   Net              240 ml      Physical Exam   Constitutional: He is oriented to person, place, and time. He appears well-developed and well-nourished. No distress.   HENT:   Head: Normocephalic and atraumatic.   Eyes: EOM are normal. Pupils are equal, round, and reactive to light.   Neck: Normal range of motion. Neck supple. No JVD present.   Cardiovascular: Normal rate and regular rhythm.  Exam reveals no gallop and no friction rub.    No murmur heard.  Pulmonary/Chest: Effort normal and breath sounds normal. No respiratory distress. He has no wheezes.   Abdominal: Soft. Bowel sounds are normal. He exhibits no distension and no mass. There is no tenderness.   Musculoskeletal: Normal range of motion. He exhibits tenderness (lumbar back pain). He exhibits no edema.   Neurological: He is alert and oriented to person, place, and time. He displays normal reflexes. A sensory deficit (bilateral toes) is present. No cranial nerve deficit.   Skin: Skin is warm and dry. No rash noted. No erythema.   Psychiatric: He has a normal mood and affect. His behavior is normal.       Significant Labs:   CBC:    Recent Labs  Lab 11/26/17  0440   WBC 6.33   GRAN 52.6  3.3   HGB 10.6*   HCT 33.3*          Chem 10:    Recent Labs  Lab 11/26/17  0440      K 3.9      CO2 27   BUN 12   CREATININE 0.8   GLU 93   CALCIUM 9.6       Significant Imaging:   No new imaging    Assessment/Plan:      * Acute bilateral low back pain with bilateral sciatica    - Stable  - Patient with discitis and acute on chronic osteomyelitis of lumbar region. Pain mostly unchanged from yesterday.  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, which complicates our discharge planning  and pain control regimen  - Symptomatic treatment with new pain regiment- gabapentin 900mg TID, Ibprofen 600mg Q6, flexeril 10mg TID PRN, and PRN Norco 10mg-325mg for break-through pain.               History of substance abuse    - Admission drug screen positive for opiates (prescribed to him as an outpatient), but also benzodiazepines, THC, and cocaine  - Not a candidate for PICC placement, so pursuing LTAC placement for long-term IV antibiotics          Discitis of lumbar region    - Stable  - NSGY and ID following, appreciate assistance  - CT lumbar spine concerning for spondylodiscitis at L4-L5, now s/p aspiration with path showing acute and chronic OM, though blood and urine cultures remain negative to date.  - ESR, CRP mildly elevated earlier this admission.   - Procal normal  - HIV Abs negative  - Pain control as described above  - PT/OT following  - Patient on ceftriaxone and vancomycin as ID's recommendation- desire 6-8 weeks of tx- end date Jan 1st at earliest   - Will plan for follow up in NSGY clinic once ABX are done with MRI w/wo contrast & infectious labs.   - Plan to discharge to LTAC in the interim to complete IV antibiotics given substance abuse history  - Increasing vancomycin dose based on trough levels          VTE Risk Mitigation         Ordered     enoxaparin injection 40 mg  Daily     Route:  Subcutaneous        11/20/17 1617     Medium Risk of VTE  Once      11/13/17 1910          Uriah Perez MD  Department of Hospital Medicine   [facility]

## 2017-11-26 NOTE — PLAN OF CARE
Problem: Patient Care Overview  Goal: Plan of Care Review  Outcome: Ongoing (interventions implemented as appropriate)  IV Vanc dose increased to 1.75 g. Possible discharge to LTAC tomorrow. VSS. AOx4. No acute changes.

## 2017-11-26 NOTE — PLAN OF CARE
Problem: Patient Care Overview  Goal: Plan of Care Review  Outcome: Ongoing (interventions implemented as appropriate)  Pt. Vss. and remained free from falls. Pt continues to walk around the unit w/ walker. Call light and personal items in reach. Pt vanc trough was therapeutic.

## 2017-11-26 NOTE — ASSESSMENT & PLAN NOTE
- Admission drug screen positive for opiates (prescribed to him as an outpatient), but also benzodiazepines, THC, and cocaine  - Not a candidate for PICC placement, so pursuing LTAC placement for long-term IV antibiotics

## 2017-11-27 LAB — VANCOMYCIN TROUGH SERPL-MCNC: 16.7 UG/ML

## 2017-11-27 PROCEDURE — 97530 THERAPEUTIC ACTIVITIES: CPT

## 2017-11-27 PROCEDURE — 36415 COLL VENOUS BLD VENIPUNCTURE: CPT

## 2017-11-27 PROCEDURE — 25000003 PHARM REV CODE 250: Performed by: STUDENT IN AN ORGANIZED HEALTH CARE EDUCATION/TRAINING PROGRAM

## 2017-11-27 PROCEDURE — 99233 SBSQ HOSP IP/OBS HIGH 50: CPT | Mod: ,,, | Performed by: HOSPITALIST

## 2017-11-27 PROCEDURE — 25000003 PHARM REV CODE 250: Performed by: INTERNAL MEDICINE

## 2017-11-27 PROCEDURE — 20600001 HC STEP DOWN PRIVATE ROOM

## 2017-11-27 PROCEDURE — 63600175 PHARM REV CODE 636 W HCPCS: Performed by: INTERNAL MEDICINE

## 2017-11-27 PROCEDURE — 80202 ASSAY OF VANCOMYCIN: CPT

## 2017-11-27 RX ADMIN — ENOXAPARIN SODIUM 40 MG: 100 INJECTION SUBCUTANEOUS at 05:11

## 2017-11-27 RX ADMIN — IBUPROFEN 600 MG: 200 TABLET, FILM COATED ORAL at 12:11

## 2017-11-27 RX ADMIN — GABAPENTIN 900 MG: 300 CAPSULE ORAL at 02:11

## 2017-11-27 RX ADMIN — STANDARDIZED SENNA CONCENTRATE AND DOCUSATE SODIUM 1 TABLET: 8.6; 5 TABLET, FILM COATED ORAL at 09:11

## 2017-11-27 RX ADMIN — CEFEPIME 2 G: 2 INJECTION, POWDER, FOR SOLUTION INTRAVENOUS at 05:11

## 2017-11-27 RX ADMIN — STANDARDIZED SENNA CONCENTRATE AND DOCUSATE SODIUM 1 TABLET: 8.6; 5 TABLET, FILM COATED ORAL at 10:11

## 2017-11-27 RX ADMIN — VANCOMYCIN HYDROCHLORIDE 1750 MG: 10 INJECTION, POWDER, LYOPHILIZED, FOR SOLUTION INTRAVENOUS at 03:11

## 2017-11-27 RX ADMIN — GABAPENTIN 900 MG: 300 CAPSULE ORAL at 06:11

## 2017-11-27 RX ADMIN — HYDROCODONE BITARTRATE AND ACETAMINOPHEN 1 TABLET: 10; 325 TABLET ORAL at 05:11

## 2017-11-27 RX ADMIN — VANCOMYCIN HYDROCHLORIDE 1750 MG: 10 INJECTION, POWDER, LYOPHILIZED, FOR SOLUTION INTRAVENOUS at 06:11

## 2017-11-27 RX ADMIN — VANCOMYCIN HYDROCHLORIDE 1750 MG: 10 INJECTION, POWDER, LYOPHILIZED, FOR SOLUTION INTRAVENOUS at 10:11

## 2017-11-27 RX ADMIN — CYCLOBENZAPRINE HYDROCHLORIDE 10 MG: 5 TABLET, FILM COATED ORAL at 09:11

## 2017-11-27 RX ADMIN — CYCLOBENZAPRINE HYDROCHLORIDE 10 MG: 5 TABLET, FILM COATED ORAL at 06:11

## 2017-11-27 RX ADMIN — HYDROCODONE BITARTRATE AND ACETAMINOPHEN 1 TABLET: 10; 325 TABLET ORAL at 10:11

## 2017-11-27 RX ADMIN — HYDROCODONE BITARTRATE AND ACETAMINOPHEN 1 TABLET: 10; 325 TABLET ORAL at 02:11

## 2017-11-27 RX ADMIN — GABAPENTIN 900 MG: 300 CAPSULE ORAL at 10:11

## 2017-11-27 RX ADMIN — HYDROCODONE BITARTRATE AND ACETAMINOPHEN 1 TABLET: 10; 325 TABLET ORAL at 09:11

## 2017-11-27 NOTE — PROGRESS NOTES
[facility] Hospital Medicine  Progress Note    Patient Name: Evgeny Wlide  MRN: [id number]  Patient Class: IP- Inpatient   Admission Date: 11/13/2017  Length of Stay: 14 days  Attending Physician: Davian Payne, *  Primary Care Provider: [facility] Medicine Team: [facility]PONSE Ron Dubose MD    Subjective:     Principal Problem:Acute bilateral low back pain with bilateral sciatica    HPI:  32 years old male with no significant past medical Hx, present to the ED with progressive lower back pain with sciatica for the last month. He works on constructions, pain start suddenly 1 month ago, at the lower back, dull, aggravated with movements. No inciting factors. Went to [facility] and [facility] ER, he had CT and MRI and they told him he had disk problem and he need neuro follow up and provided with muscle relaxant and narcotic for pain control. Patient stated the medicine is gone and the ain is worse with numbness in his left leg. He is not working for the last month. Denied any trauma, recent infection, surgery, foreign body, recent IV drug abuse. Also denied any fever, mekhi loss, fecal or urine incontinence, saddle anaesthesia.          Hospital Course:  11/14: NSGY following             Blood cultures NGTD, Urine cultures pending.              CT lumbar spine concerning for spondylodiscitis.  11/15: afebrile overnight, blood CX NGTD, NSGY stated no indications for NSGY interventions this admission, they want follow him up as outpatient after after Abx. We will pursue IR for disk Bx/aspirate for Dx.     11/16: NAEON. Vitals stable. Pending disc aspiration via IR tomorrow.  11/17: NAEON. Vitals stable. Planned for L4-L5 biopsy today.  11/18: NAEON. Vitals stable. S/p L4-L5 disc aspiration. Cultures, pathology report pending.  11/19: NAEON. Vitals stable.S/p L4-L5 disc aspiration. Cultures, pathology report pending.  11/20: NAEON. Vitals stable. Pathology showing acute and  chronic OM. Cultures remain negative.  11/21: NAEON. Vitals stable. Patient with trouble sleeping last night with pain in the buttocks and back as well as numbness in the toes. Pt still without adequate pain control.  11/22: NAEON. VSS. Patient with increased pain issues today with pain radiating from the back down to his feet.   11/22: NAEON. VSS. Patient with better pain control today.  11/23: NAEON. VSS. Patient with better pain control today.  11/24: NAEON. VSS. Patient asking for increased frequency of pain meds today.  11/27: NAEON. VSS.     Interval History: See hospital course above.    Review of Systems   Constitutional: Negative for chills, fever and unexpected weight change.   HENT: Negative for hearing loss.    Eyes: Negative for visual disturbance.   Respiratory: Negative for shortness of breath.    Cardiovascular: Negative for chest pain.   Gastrointestinal: Negative for abdominal pain, diarrhea, nausea and vomiting.   Genitourinary: Negative for dysuria.   Musculoskeletal: Positive for back pain. Negative for arthralgias.   Skin: Negative for rash.   Neurological: Positive for numbness (toes). Negative for seizures.   Hematological: Negative for adenopathy. Does not bruise/bleed easily.     Objective:     Vital Signs (Most Recent):  Temp: 98.5 °F (36.9 °C) (11/27/17 0804)  Pulse: 104 (11/27/17 0804)  Resp: 16 (11/27/17 0804)  BP: 126/75 (11/27/17 0804)  SpO2: 100 % (11/27/17 0804) Vital Signs (24h Range):  Temp:  [96.8 °F (36 °C)-98.6 °F (37 °C)] 98.5 °F (36.9 °C)  Pulse:  [] 104  Resp:  [16-18] 16  SpO2:  [97 %-100 %] 100 %  BP: (110-131)/(58-75) 126/75     Weight: 90.2 kg (198 lb 13.7 oz)  Body mass index is 25.53 kg/m².    Intake/Output Summary (Last 24 hours) at 11/27/17 0916  Last data filed at 11/26/17 1800   Gross per 24 hour   Intake              480 ml   Output                0 ml   Net              480 ml      Physical Exam   Constitutional: He appears well-developed and  well-nourished.   HENT:   Head: Normocephalic and atraumatic.   Eyes: EOM are normal. Pupils are equal, round, and reactive to light.   Neck: No tracheal deviation present. No thyromegaly present.   Cardiovascular: Normal rate.    No murmur heard.  Pulmonary/Chest: Effort normal and breath sounds normal. He has no wheezes. He has no rales.   Abdominal: There is no tenderness. No hernia.   Musculoskeletal: He exhibits tenderness (lumbar spine and down both legs). He exhibits no edema.   Neurological: A sensory deficit (BL toes.) is present. No cranial nerve deficit. He exhibits normal muscle tone.   Skin: Skin is warm. No rash noted.   Psychiatric: He has a normal mood and affect.       Significant Labs:   CBC:     Recent Labs  Lab 11/26/17  0440   WBC 6.33   HGB 10.6*   HCT 33.3*        CMP:     Recent Labs  Lab 11/26/17  0440      K 3.9      CO2 27   GLU 93   BUN 12   CREATININE 0.8   CALCIUM 9.6   ANIONGAP 10   EGFRNONAA >60.0       Significant Imaging: I have reviewed all pertinent imaging results/findings within the past 24 hours.    Assessment/Plan:      * Acute bilateral low back pain with bilateral sciatica    - Stable  - Patient with discitis and acute on chronic osteomyelitis of lumbar region. Pain mostly unchanged from yesterday.  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, which complicates our discharge planning and pain control regimen  - Symptomatic treatment with new pain regiment- gabapentin 900mg TID, Ibprofen 600mg Q6, flexeril 10mg TID PRN, and PRN Norco 10mg-325mg for break-through pain.               Discitis of lumbar region    - Stable  - NSGY and ID following, appreciate assistance  - CT lumbar spine concerning for spondylodiscitis at L4-L5, now s/p aspiration with path showing acute and chronic OM, though blood and urine cultures remain negative to date.  - ESR, CRP mildly elevated earlier this admission.   - Procal normal  - HIV Abs negative  - Pain control as described above  - PT/OT  following  - Patient on ceftriaxone and vancomycin as ID's recommendation- desire 6-8 weeks of tx- end date Jan 1st at earliest   - Will plan for follow up in NSGY clinic once ABX are done with MRI w/wo contrast & infectious labs.   - Plan to discharge to LTAC in the interim to complete IV antibiotics given substance abuse history  - Increasing vancomycin dose based on trough levels        History of substance abuse    - Admission drug screen positive for opiates (prescribed to him as an outpatient), but also benzodiazepines, THC, and cocaine  - Not a candidate for PICC placement, so pursuing LTAC placement for long-term IV antibiotics            VTE Risk Mitigation         Ordered     enoxaparin injection 40 mg  Daily     Route:  Subcutaneous        11/20/17 1617     Medium Risk of VTE  Once      11/13/17 1910              Ron Dubose MD  Department of Hospital Medicine   [facility]

## 2017-11-27 NOTE — PT/OT/SLP PROGRESS
"Occupational Therapy  Treatment/Discharge     Evgeny Wilde   MRN: [id number]   Admitting Diagnosis: Acute bilateral low back pain with bilateral sciatica    OT Date of Treatment: 11/27/17   OT Start Time: 0715  OT Stop Time: 0730  OT Total Time (min): 15 min    Billable Minutes:  Therapeutic Activity 15    General Precautions: Standard, fall  Orthopedic Precautions: N/A  Braces: N/A    Do you have any cultural, spiritual, Jehovah's witness conflicts, given your current situation?: none stated    Subjective:  Communicated with RN prior to session.  "I dont know when I am leaving."  Pain/Comfort  Pain Rating 1: 6/10  Location - Side 1: Bilateral  Location - Orientation 1: lower  Location 1: back  Pain Addressed 1: Distraction, Reposition    Objective:  Patient found with: telemetry     Functional Mobility:  Bed Mobility:  Rolling/Turning to Left: Independent  Rolling/Turning Right: Independent  Scooting/Bridging: Independent  Supine to Sit: Independent  Sit to Supine: Independent    Transfers:   Sit <> Stand Assistance: Modified Independent  Sit <> Stand Assistive Device: Rolling Walker  Bed <> Chair Transfer Assistance: Modified Independent  Bed <> Chair Assistive Device: Rolling Walker  Toilet Transfer Technique: Stand Pivot  Toilet Transfer Assistance: Modified Independent  Toilet Transfer Assistive Device: Rolling Walker    Functional Ambulation: Pt abl eto demonstrate safe and effective functional mobility with wlaker    Activities of Daily Living:     Patient abl eto demonstrate overall I dressing and hygiene with min A with lower extremity dressing  LE Dressing Level of Assistance: Minimum assistance  Toileting Where Assessed: Toilet  Toileting Level of Assistance: Independent       Balance:   Static Sit: NORMAL: No deviations seen in posture held statically  Dynamic Sit: NORMAL: No deviations seen in posture held dynamically  Static Stand: NORMAL: No deviations seen in posture held statically  Dynamic stand: NORMAL: No " "deviations seen in posture held dynamically    Therapeutic Activities and Exercises:  Pt educated on continued safety and increased self care participation.  Deep breathing for pain.  Plan for DC      AM-PAC 6 CLICK ADL   How much help from another person does this patient currently need?   1 = Unable, Total/Dependent Assistance  2 = A lot, Maximum/Moderate Assistance  3 = A little, Minimum/Contact Guard/Supervision  4 = None, Modified [place]/Independent    Putting on and taking off regular lower body clothing? : 3  Bathing (including washing, rinsing, drying)?: 3  Toileting, which includes using toilet, bedpan, or urinal? : 4  Putting on and taking off regular upper body clothing?: 4  Taking care of personal grooming such as brushing teeth?: 4  Eating meals?: 4  Total Score: 22     AM-PAC Raw Score CMS "G-Code Modifier Level of Impairment Assistance   6 % Total / Unable   7 - 8 CM 80 - 100% Maximal Assist   9-13 CL 60 - 80% Moderate Assist   14 - 19 CK 40 - 60% Moderate Assist   20 - 22 CJ 20 - 40% Minimal Assist   23 CI 1-20% SBA / CGA   24 CH 0% Independent/ Mod I       ASSESSMENT:  Evgeny Wilde is a 32 y.o. male with a medical diagnosis of Acute bilateral low back pain with bilateral sciatica and presents with max functional perofrmance and prepared for Dc.  Pt plans to go to rehab and is awaiting placement.  Pt DC from skilled OT with max inpatient gains    Rehab identified problem list/impairments: Rehab identified problem list/impairments: weakness    Rehab potential is good.    Activity tolerance: Good    Discharge recommendations: Discharge Facility/Level Of Care Needs: home with home health     Barriers to discharge: Barriers to Discharge: Inaccessible home environment    Equipment recommendations: bath bench     GOALS:    Occupational Therapy Goals        Problem: Occupational Therapy Goal    Goal Priority Disciplines Outcome Interventions   Occupational Therapy Goal     OT, PT/OT Ongoing " (interventions implemented as appropriate)    Description:  Goals to be met by: 12/10/17    Patient will increase functional independence with ADLs by performing:    UE Dressing with [place]. MET 11/24/17  LE Dressing with [place].   Grooming while standing at sink with [place]. MET 11/24/17  Toileting from toilet with [place] for hygiene and clothing management. MET 11/24/17  Bathing from  sitting at sink with [place].                       Plan:  Patient to be seen 3 x/week to address the above listed problems via self-care/home management  Plan of Care expires: 12/14/17  Plan of Care reviewed with: patient         Palak Nobles, OT  11/27/2017

## 2017-11-27 NOTE — PLAN OF CARE
Problem: Patient Care Overview  Goal: Plan of Care Review  Outcome: Ongoing (interventions implemented as appropriate)  Pt vss and remained free from falls. Pt tolerated the increase in Vanc. Call light and personal items in reach. Shot go to LTAC today or tomorrow.

## 2017-11-27 NOTE — PLAN OF CARE
Sw did upload MAR and latest micro labs [facility] to follow.

## 2017-11-27 NOTE — SUBJECTIVE & OBJECTIVE
Interval History: See hospital course above.    Review of Systems   Constitutional: Negative for chills, fever and unexpected weight change.   HENT: Negative for hearing loss.    Eyes: Negative for visual disturbance.   Respiratory: Negative for shortness of breath.    Cardiovascular: Negative for chest pain.   Gastrointestinal: Negative for abdominal pain, diarrhea, nausea and vomiting.   Genitourinary: Negative for dysuria.   Musculoskeletal: Positive for back pain. Negative for arthralgias.   Skin: Negative for rash.   Neurological: Positive for numbness (toes). Negative for seizures.   Hematological: Negative for adenopathy. Does not bruise/bleed easily.     Objective:     Vital Signs (Most Recent):  Temp: 98.5 °F (36.9 °C) (11/27/17 0804)  Pulse: 104 (11/27/17 0804)  Resp: 16 (11/27/17 0804)  BP: 126/75 (11/27/17 0804)  SpO2: 100 % (11/27/17 0804) Vital Signs (24h Range):  Temp:  [96.8 °F (36 °C)-98.6 °F (37 °C)] 98.5 °F (36.9 °C)  Pulse:  [] 104  Resp:  [16-18] 16  SpO2:  [97 %-100 %] 100 %  BP: (110-131)/(58-75) 126/75     Weight: 90.2 kg (198 lb 13.7 oz)  Body mass index is 25.53 kg/m².    Intake/Output Summary (Last 24 hours) at 11/27/17 0916  Last data filed at 11/26/17 1800   Gross per 24 hour   Intake              480 ml   Output                0 ml   Net              480 ml      Physical Exam   Constitutional: He appears well-developed and well-nourished.   HENT:   Head: Normocephalic and atraumatic.   Eyes: EOM are normal. Pupils are equal, round, and reactive to light.   Neck: No tracheal deviation present. No thyromegaly present.   Cardiovascular: Normal rate.    No murmur heard.  Pulmonary/Chest: Effort normal and breath sounds normal. He has no wheezes. He has no rales.   Abdominal: There is no tenderness. No hernia.   Musculoskeletal: He exhibits tenderness (lumbar spine and down both legs). He exhibits no edema.   Neurological: A sensory deficit (BL toes.) is present. No cranial nerve  deficit. He exhibits normal muscle tone.   Skin: Skin is warm. No rash noted.   Psychiatric: He has a normal mood and affect.       Significant Labs:   CBC:     Recent Labs  Lab 11/26/17  0440   WBC 6.33   HGB 10.6*   HCT 33.3*        CMP:     Recent Labs  Lab 11/26/17  0440      K 3.9      CO2 27   GLU 93   BUN 12   CREATININE 0.8   CALCIUM 9.6   ANIONGAP 10   EGFRNONAA >60.0       Significant Imaging: I have reviewed all pertinent imaging results/findings within the past 24 hours.

## 2017-11-28 LAB
ANION GAP SERPL CALC-SCNC: 9 MMOL/L
BASOPHILS # BLD AUTO: 0.03 K/UL
BASOPHILS NFR BLD: 0.6 %
BUN SERPL-MCNC: 12 MG/DL
CALCIUM SERPL-MCNC: 9.3 MG/DL
CHLORIDE SERPL-SCNC: 103 MMOL/L
CO2 SERPL-SCNC: 28 MMOL/L
CREAT SERPL-MCNC: 0.9 MG/DL
DIFFERENTIAL METHOD: ABNORMAL
EOSINOPHIL # BLD AUTO: 0.1 K/UL
EOSINOPHIL NFR BLD: 2.3 %
ERYTHROCYTE [DISTWIDTH] IN BLOOD BY AUTOMATED COUNT: 14.8 %
EST. GFR  (AFRICAN AMERICAN): >60 ML/MIN/1.73 M^2
EST. GFR  (NON AFRICAN AMERICAN): >60 ML/MIN/1.73 M^2
GLUCOSE SERPL-MCNC: 92 MG/DL
HCT VFR BLD AUTO: 32.4 %
HGB BLD-MCNC: 10.1 G/DL
IMM GRANULOCYTES # BLD AUTO: 0.04 K/UL
IMM GRANULOCYTES NFR BLD AUTO: 0.8 %
LYMPHOCYTES # BLD AUTO: 1.9 K/UL
LYMPHOCYTES NFR BLD: 35.3 %
MCH RBC QN AUTO: 26.6 PG
MCHC RBC AUTO-ENTMCNC: 31.2 G/DL
MCV RBC AUTO: 85 FL
MONOCYTES # BLD AUTO: 0.9 K/UL
MONOCYTES NFR BLD: 17.5 %
NEUTROPHILS # BLD AUTO: 2.3 K/UL
NEUTROPHILS NFR BLD: 43.5 %
NRBC BLD-RTO: 0 /100 WBC
PLATELET # BLD AUTO: 255 K/UL
PMV BLD AUTO: 10 FL
POTASSIUM SERPL-SCNC: 4.4 MMOL/L
RBC # BLD AUTO: 3.8 M/UL
SODIUM SERPL-SCNC: 140 MMOL/L
WBC # BLD AUTO: 5.27 K/UL

## 2017-11-28 PROCEDURE — 25000003 PHARM REV CODE 250: Performed by: STUDENT IN AN ORGANIZED HEALTH CARE EDUCATION/TRAINING PROGRAM

## 2017-11-28 PROCEDURE — 25000003 PHARM REV CODE 250: Performed by: INTERNAL MEDICINE

## 2017-11-28 PROCEDURE — 63600175 PHARM REV CODE 636 W HCPCS: Performed by: INTERNAL MEDICINE

## 2017-11-28 PROCEDURE — 80048 BASIC METABOLIC PNL TOTAL CA: CPT

## 2017-11-28 PROCEDURE — 36415 COLL VENOUS BLD VENIPUNCTURE: CPT

## 2017-11-28 PROCEDURE — 85025 COMPLETE CBC W/AUTO DIFF WBC: CPT

## 2017-11-28 PROCEDURE — 99231 SBSQ HOSP IP/OBS SF/LOW 25: CPT | Mod: ,,, | Performed by: HOSPITALIST

## 2017-11-28 PROCEDURE — 20600001 HC STEP DOWN PRIVATE ROOM

## 2017-11-28 RX ADMIN — VANCOMYCIN HYDROCHLORIDE 1750 MG: 10 INJECTION, POWDER, LYOPHILIZED, FOR SOLUTION INTRAVENOUS at 02:11

## 2017-11-28 RX ADMIN — CYCLOBENZAPRINE HYDROCHLORIDE 10 MG: 5 TABLET, FILM COATED ORAL at 12:11

## 2017-11-28 RX ADMIN — VANCOMYCIN HYDROCHLORIDE 1750 MG: 10 INJECTION, POWDER, LYOPHILIZED, FOR SOLUTION INTRAVENOUS at 06:11

## 2017-11-28 RX ADMIN — HYDROCODONE BITARTRATE AND ACETAMINOPHEN 1 TABLET: 10; 325 TABLET ORAL at 03:11

## 2017-11-28 RX ADMIN — HYDROCODONE BITARTRATE AND ACETAMINOPHEN 1 TABLET: 10; 325 TABLET ORAL at 05:11

## 2017-11-28 RX ADMIN — IBUPROFEN 600 MG: 200 TABLET, FILM COATED ORAL at 07:11

## 2017-11-28 RX ADMIN — STANDARDIZED SENNA CONCENTRATE AND DOCUSATE SODIUM 1 TABLET: 8.6; 5 TABLET, FILM COATED ORAL at 09:11

## 2017-11-28 RX ADMIN — ENOXAPARIN SODIUM 40 MG: 100 INJECTION SUBCUTANEOUS at 06:11

## 2017-11-28 RX ADMIN — STANDARDIZED SENNA CONCENTRATE AND DOCUSATE SODIUM 1 TABLET: 8.6; 5 TABLET, FILM COATED ORAL at 10:11

## 2017-11-28 RX ADMIN — VANCOMYCIN HYDROCHLORIDE 1750 MG: 10 INJECTION, POWDER, LYOPHILIZED, FOR SOLUTION INTRAVENOUS at 10:11

## 2017-11-28 RX ADMIN — GABAPENTIN 900 MG: 300 CAPSULE ORAL at 02:11

## 2017-11-28 RX ADMIN — CYCLOBENZAPRINE HYDROCHLORIDE 10 MG: 5 TABLET, FILM COATED ORAL at 11:11

## 2017-11-28 RX ADMIN — IBUPROFEN 600 MG: 200 TABLET, FILM COATED ORAL at 12:11

## 2017-11-28 RX ADMIN — HYDROCODONE BITARTRATE AND ACETAMINOPHEN 1 TABLET: 10; 325 TABLET ORAL at 09:11

## 2017-11-28 RX ADMIN — CYCLOBENZAPRINE HYDROCHLORIDE 10 MG: 5 TABLET, FILM COATED ORAL at 02:11

## 2017-11-28 RX ADMIN — CEFEPIME 2 G: 2 INJECTION, POWDER, FOR SOLUTION INTRAVENOUS at 06:11

## 2017-11-28 RX ADMIN — HYDROCODONE BITARTRATE AND ACETAMINOPHEN 1 TABLET: 10; 325 TABLET ORAL at 07:11

## 2017-11-28 RX ADMIN — GABAPENTIN 900 MG: 300 CAPSULE ORAL at 10:11

## 2017-11-28 RX ADMIN — GABAPENTIN 900 MG: 300 CAPSULE ORAL at 06:11

## 2017-11-28 NOTE — PLAN OF CARE
Problem: Patient Care Overview  Goal: Plan of Care Review  Outcome: Ongoing (interventions implemented as appropriate)  Pt vss. Pt remained free from falls. Pt walks off unit to smoke using walker. Personal items and call light in reach. Maintained a clutter free environment. Pt waiting for LTAC.

## 2017-11-28 NOTE — SUBJECTIVE & OBJECTIVE
Interval History: See hospital course above.    Review of Systems   Constitutional: Negative for chills, fever and unexpected weight change.   HENT: Negative for hearing loss.    Eyes: Negative for visual disturbance.   Respiratory: Negative for shortness of breath.    Cardiovascular: Negative for chest pain.   Gastrointestinal: Negative for abdominal pain, diarrhea, nausea and vomiting.   Genitourinary: Negative for dysuria.   Musculoskeletal: Positive for back pain. Negative for arthralgias.   Skin: Negative for rash.   Neurological: Positive for numbness (toes). Negative for seizures.   Hematological: Negative for adenopathy. Does not bruise/bleed easily.     Objective:     Vital Signs (Most Recent):  Temp: 97.8 °F (36.6 °C) (11/28/17 0408)  Pulse: 82 (11/28/17 0408)  Resp: 18 (11/28/17 0408)  BP: 118/73 (11/28/17 0408)  SpO2: 99 % (11/28/17 0408) Vital Signs (24h Range):  Temp:  [96.5 °F (35.8 °C)-98.2 °F (36.8 °C)] 97.8 °F (36.6 °C)  Pulse:  [] 82  Resp:  [18-19] 18  SpO2:  [98 %-100 %] 99 %  BP: (111-133)/(60-73) 118/73     Weight: 90.2 kg (198 lb 13.7 oz)  Body mass index is 25.53 kg/m².    Intake/Output Summary (Last 24 hours) at 11/28/17 0931  Last data filed at 11/27/17 1800   Gross per 24 hour   Intake              340 ml   Output              550 ml   Net             -210 ml      Physical Exam   Constitutional: He appears well-developed and well-nourished.   HENT:   Head: Normocephalic and atraumatic.   Eyes: EOM are normal. Pupils are equal, round, and reactive to light.   Neck: No tracheal deviation present. No thyromegaly present.   Cardiovascular: Normal rate.    No murmur heard.  Pulmonary/Chest: Effort normal and breath sounds normal. He has no wheezes. He has no rales.   Abdominal: There is no tenderness. No hernia.   Musculoskeletal: He exhibits tenderness (lumbar spine and down both legs). He exhibits no edema.   Neurological: A sensory deficit (BL toes.) is present. No cranial nerve  deficit. He exhibits normal muscle tone.   Skin: Skin is warm. No rash noted.   Psychiatric: He has a normal mood and affect.       Significant Labs:   CBC:     Recent Labs  Lab 11/28/17 0436   WBC 5.27   HGB 10.1*   HCT 32.4*        CMP:     Recent Labs  Lab 11/28/17 0436      K 4.4      CO2 28   GLU 92   BUN 12   CREATININE 0.9   CALCIUM 9.3   ANIONGAP 9   EGFRNONAA >60.0       Significant Imaging: I have reviewed all pertinent imaging results/findings within the past 24 hours.

## 2017-11-28 NOTE — PLAN OF CARE
Met with Pt at bedside, informed of Sw working with [facility] and [facility] for placement. Pt did verbalize understanding and Sw will notify Pt when Sw has a decision from LTAC facilities.

## 2017-11-28 NOTE — PLAN OF CARE
Problem: Patient Care Overview  Goal: Plan of Care Review  Outcome: Ongoing (interventions implemented as appropriate)  Patient has no new complaints. Ambulating in lau twice today. Pain managed with current PRN regimen. Vitals WNL. Patient updated on D/C plans.

## 2017-11-28 NOTE — PLAN OF CARE
11/28/17 1443   Discharge Reassessment   Assessment Type Discharge Planning Reassessment   Provided patient/caregiver education on the expected discharge date and the discharge plan Yes   Do you have any problems affording any of your prescribed medications? TBD   Discharge Plan A Long-term acute care facility (LTAC)   Can the patient answer the patient profile reliably? Yes, cognitively intact   How does the patient rate their overall health at the present time? Fair   Describe the patient's ability to walk at the present time. Walks with the help of equipment   How often would a person be available to care for the patient? Whenever needed   During the past month, has the patient often been bothered by feeling down, depressed or hopeless? No   During the past month, has the patient often been bothered by little interest or pleasure in doing things? No

## 2017-11-28 NOTE — ASSESSMENT & PLAN NOTE
- Stable  - Patient with discitis and acute on chronic osteomyelitis of lumbar region. Pain mostly unchanged from yesterday.  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, which complicates our discharge planning and pain control regimen  - Symptomatic treatment with new pain regiment- gabapentin 900mg TID, Ibprofen 600mg Q6, flexeril 10mg TID PRN, and PRN Norco 10mg-325mg for break-through pain.   - For spasms- can consider baclofen and heat packs

## 2017-11-28 NOTE — PROGRESS NOTES
[facility] Hospital Medicine  Progress Note    Patient Name: Evgeny Wilde  MRN: [id number]  Patient Class: IP- Inpatient   Admission Date: 11/13/2017  Length of Stay: 15 days  Attending Physician: Sara Arauz MD  Primary Care Provider: Primary Doctor [facility] [place] [facility] [place] [place] Ron Dubose MD    Subjective:     Principal Problem:Acute bilateral low back pain with bilateral sciatica    HPI:  32 years old male with no significant past medical Hx, present to the ED with progressive lower back pain with sciatica for the last month. He works on constructions, pain start suddenly 1 month ago, at the lower back, dull, aggravated with movements. No inciting factors. Went to [facility] and [facility] ER, he had CT and MRI and they told him he had disk problem and he need neuro follow up and provided with muscle relaxant and narcotic for pain control. Patient stated the medicine is gone and the ain is worse with numbness in his left leg. He is not working for the last month. Denied any trauma, recent infection, surgery, foreign body, recent IV drug abuse. Also denied any fever, mekhi loss, fecal or urine incontinence, saddle anaesthesia.          Hospital Course:  11/14: NSGY following             Blood cultures NGTD, Urine cultures pending.              CT lumbar spine concerning for spondylodiscitis.  11/15: afebrile overnight, blood CX NGTD, NSGY stated no indications for NSGY interventions this admission, they want follow him up as outpatient after after Abx. We will pursue IR for disk Bx/aspirate for Dx.     11/16: NAEON. Vitals stable. Pending disc aspiration via IR tomorrow.  11/17: NAEON. Vitals stable. Planned for L4-L5 biopsy today.  11/18: NAEON. Vitals stable. S/p L4-L5 disc aspiration. Cultures, pathology report pending.  11/19: NAEON. Vitals stable.S/p L4-L5 disc aspiration. Cultures, pathology report pending.  11/20: NAEON. Vitals stable. Pathology showing acute and chronic  OM. Cultures remain negative.  11/21: NAEON. Vitals stable. Patient with trouble sleeping last night with pain in the buttocks and back as well as numbness in the toes. Pt still without adequate pain control.  11/22: NAEON. VSS. Patient with increased pain issues today with pain radiating from the back down to his feet.   11/22: NAEON. VSS. Patient with better pain control today.  11/23: NAEON. VSS. Patient with better pain control today.  11/24: NAEON. VSS. Patient asking for increased frequency of pain meds today.  11/27: NAEON. VSS.   11/28: NAEON. VSS. Patient with spasms still but no other complaints.    Interval History: See hospital course above.    Review of Systems   Constitutional: Negative for chills, fever and unexpected weight change.   HENT: Negative for hearing loss.    Eyes: Negative for visual disturbance.   Respiratory: Negative for shortness of breath.    Cardiovascular: Negative for chest pain.   Gastrointestinal: Negative for abdominal pain, diarrhea, nausea and vomiting.   Genitourinary: Negative for dysuria.   Musculoskeletal: Positive for back pain. Negative for arthralgias.   Skin: Negative for rash.   Neurological: Positive for numbness (toes). Negative for seizures.   Hematological: Negative for adenopathy. Does not bruise/bleed easily.     Objective:     Vital Signs (Most Recent):  Temp: 97.8 °F (36.6 °C) (11/28/17 0408)  Pulse: 82 (11/28/17 0408)  Resp: 18 (11/28/17 0408)  BP: 118/73 (11/28/17 0408)  SpO2: 99 % (11/28/17 0408) Vital Signs (24h Range):  Temp:  [96.5 °F (35.8 °C)-98.2 °F (36.8 °C)] 97.8 °F (36.6 °C)  Pulse:  [] 82  Resp:  [18-19] 18  SpO2:  [98 %-100 %] 99 %  BP: (111-133)/(60-73) 118/73     Weight: 90.2 kg (198 lb 13.7 oz)  Body mass index is 25.53 kg/m².    Intake/Output Summary (Last 24 hours) at 11/28/17 0931  Last data filed at 11/27/17 1800   Gross per 24 hour   Intake              340 ml   Output              550 ml   Net             -210 ml      Physical Exam    Constitutional: He appears well-developed and well-nourished.   HENT:   Head: Normocephalic and atraumatic.   Eyes: EOM are normal. Pupils are equal, round, and reactive to light.   Neck: No tracheal deviation present. No thyromegaly present.   Cardiovascular: Normal rate.    No murmur heard.  Pulmonary/Chest: Effort normal and breath sounds normal. He has no wheezes. He has no rales.   Abdominal: There is no tenderness. No hernia.   Musculoskeletal: He exhibits tenderness (lumbar spine and down both legs). He exhibits no edema.   Neurological: A sensory deficit (BL toes.) is present. No cranial nerve deficit. He exhibits normal muscle tone.   Skin: Skin is warm. No rash noted.   Psychiatric: He has a normal mood and affect.       Significant Labs:   CBC:     Recent Labs  Lab 11/28/17  0436   WBC 5.27   HGB 10.1*   HCT 32.4*        CMP:     Recent Labs  Lab 11/28/17  0436      K 4.4      CO2 28   GLU 92   BUN 12   CREATININE 0.9   CALCIUM 9.3   ANIONGAP 9   EGFRNONAA >60.0       Significant Imaging: I have reviewed all pertinent imaging results/findings within the past 24 hours.    Assessment/Plan:      * Acute bilateral low back pain with bilateral sciatica    - Stable  - Patient with discitis and acute on chronic osteomyelitis of lumbar region. Pain mostly unchanged from yesterday.  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, which complicates our discharge planning and pain control regimen  - Symptomatic treatment with new pain regiment- gabapentin 900mg TID, Ibprofen 600mg Q6, flexeril 10mg TID PRN, and PRN Norco 10mg-325mg for break-through pain.               Discitis of lumbar region    - Stable  - NSGY and ID following, appreciate assistance  - CT lumbar spine concerning for spondylodiscitis at L4-L5, now s/p aspiration with path showing acute and chronic OM, though blood and urine cultures remain negative to date.  - ESR, CRP mildly elevated earlier this admission.   - Procal normal  - HIV Abs  negative  - Pain control as described above  - PT/OT following  - Patient on ceftriaxone and vancomycin as ID's recommendation- desire 6-8 weeks of tx- end date Jan 1st at earliest   - Will plan for follow up in NSGY clinic once ABX are done with MRI w/wo contrast & infectious labs.   - Plan to discharge to LTAC in the interim to complete IV antibiotics given substance abuse history  - Vancomycin dose looking good based on troughs        History of substance abuse    - Admission drug screen positive for opiates (prescribed to him as an outpatient), but also benzodiazepines, THC, and cocaine  - Not a candidate for PICC placement, so pursuing LTAC placement for long-term IV antibiotics            VTE Risk Mitigation         Ordered     enoxaparin injection 40 mg  Daily     Route:  Subcutaneous        11/20/17 1617     Medium Risk of VTE  Once      11/13/17 1910              Ron Dubose MD  Department of Hospital Medicine   [facility]

## 2017-11-28 NOTE — PLAN OF CARE
"Sw informed by Yue with [facility] that "they have no medicaid beds at this time." Sw to follow with other 4 SNF/swings facilities.   "

## 2017-11-28 NOTE — PLAN OF CARE
Sw was asked by CM per Pt's insurance to refer Pt to 5 swing bed facilities per Krysten with Pt's insurance. [facility]. Sw to follow.

## 2017-11-28 NOTE — PLAN OF CARE
Problem: Patient Care Overview  Goal: Plan of Care Review  Outcome: Ongoing (interventions implemented as appropriate)  Patient has no new complaints today. Pending d/c plans, patient understands goals of discharge. Patient tolerating well current prn pain medications. Vitals WNL.

## 2017-11-28 NOTE — ASSESSMENT & PLAN NOTE
- Stable  - NSGY and ID following, appreciate assistance  - CT lumbar spine concerning for spondylodiscitis at L4-L5, now s/p aspiration with path showing acute and chronic OM, though blood and urine cultures remain negative to date.  - ESR, CRP mildly elevated earlier this admission.   - Procal normal  - HIV Abs negative  - Pain control as described above  - PT/OT following  - Patient on ceftriaxone and vancomycin as ID's recommendation- desire 6-8 weeks of tx- end date Jan 1st at earliest   - Will plan for follow up in NSGY clinic once ABX are done with MRI w/wo contrast & infectious labs.   - Plan to discharge to LTAC in the interim to complete IV antibiotics given substance abuse history  - Vancomycin dose looking good based on troughs

## 2017-11-29 PROCEDURE — 63600175 PHARM REV CODE 636 W HCPCS: Performed by: INTERNAL MEDICINE

## 2017-11-29 PROCEDURE — 25000003 PHARM REV CODE 250: Performed by: STUDENT IN AN ORGANIZED HEALTH CARE EDUCATION/TRAINING PROGRAM

## 2017-11-29 PROCEDURE — 25000003 PHARM REV CODE 250: Performed by: INTERNAL MEDICINE

## 2017-11-29 PROCEDURE — 20600001 HC STEP DOWN PRIVATE ROOM

## 2017-11-29 PROCEDURE — 99231 SBSQ HOSP IP/OBS SF/LOW 25: CPT | Mod: ,,, | Performed by: HOSPITALIST

## 2017-11-29 RX ORDER — METHOCARBAMOL 500 MG/1
1500 TABLET, FILM COATED ORAL EVERY 4 HOURS PRN
Status: DISCONTINUED | OUTPATIENT
Start: 2017-11-29 | End: 2017-11-30

## 2017-11-29 RX ORDER — CYCLOBENZAPRINE HCL 10 MG
10 TABLET ORAL 3 TIMES DAILY PRN
Status: DISCONTINUED | OUTPATIENT
Start: 2017-11-29 | End: 2017-11-29

## 2017-11-29 RX ADMIN — STANDARDIZED SENNA CONCENTRATE AND DOCUSATE SODIUM 1 TABLET: 8.6; 5 TABLET, FILM COATED ORAL at 09:11

## 2017-11-29 RX ADMIN — HYDROCODONE BITARTRATE AND ACETAMINOPHEN 1 TABLET: 10; 325 TABLET ORAL at 09:11

## 2017-11-29 RX ADMIN — HYDROCODONE BITARTRATE AND ACETAMINOPHEN 1 TABLET: 10; 325 TABLET ORAL at 11:11

## 2017-11-29 RX ADMIN — CEFEPIME 2 G: 2 INJECTION, POWDER, FOR SOLUTION INTRAVENOUS at 06:11

## 2017-11-29 RX ADMIN — ENOXAPARIN SODIUM 40 MG: 100 INJECTION SUBCUTANEOUS at 06:11

## 2017-11-29 RX ADMIN — GABAPENTIN 900 MG: 300 CAPSULE ORAL at 06:11

## 2017-11-29 RX ADMIN — VANCOMYCIN HYDROCHLORIDE 1750 MG: 10 INJECTION, POWDER, LYOPHILIZED, FOR SOLUTION INTRAVENOUS at 07:11

## 2017-11-29 RX ADMIN — IBUPROFEN 600 MG: 200 TABLET, FILM COATED ORAL at 07:11

## 2017-11-29 RX ADMIN — METHOCARBAMOL 1500 MG: 500 TABLET ORAL at 11:11

## 2017-11-29 RX ADMIN — HYDROCODONE BITARTRATE AND ACETAMINOPHEN 1 TABLET: 10; 325 TABLET ORAL at 06:11

## 2017-11-29 RX ADMIN — GABAPENTIN 900 MG: 300 CAPSULE ORAL at 09:11

## 2017-11-29 RX ADMIN — GABAPENTIN 900 MG: 300 CAPSULE ORAL at 01:11

## 2017-11-29 RX ADMIN — VANCOMYCIN HYDROCHLORIDE 1750 MG: 10 INJECTION, POWDER, LYOPHILIZED, FOR SOLUTION INTRAVENOUS at 09:11

## 2017-11-29 RX ADMIN — HYDROCODONE BITARTRATE AND ACETAMINOPHEN 1 TABLET: 10; 325 TABLET ORAL at 03:11

## 2017-11-29 RX ADMIN — HYDROCODONE BITARTRATE AND ACETAMINOPHEN 1 TABLET: 10; 325 TABLET ORAL at 02:11

## 2017-11-29 RX ADMIN — VANCOMYCIN HYDROCHLORIDE 1750 MG: 10 INJECTION, POWDER, LYOPHILIZED, FOR SOLUTION INTRAVENOUS at 03:11

## 2017-11-29 NOTE — PROGRESS NOTES
[facility] Hospital Medicine  Progress Note    Patient Name: Evgeny Wilde  MRN: [id number]  Patient Class: IP- Inpatient   Admission Date: 11/13/2017  Length of Stay: 16 days  Attending Physician: Sara Arauz MD  Primary Care Provider: Primary Doctor [facility] Medicine Team: [facility] [place] [facility] Ron Dubose MD    Subjective:     Principal Problem:Discitis of lumbar region    HPI:  32 years old male with no significant past medical Hx, present to the ED with progressive lower back pain with sciatica for the last month. He works on constructions, pain start suddenly 1 month ago, at the lower back, dull, aggravated with movements. No inciting factors. Went to [facility] and [facility] ER, he had CT and MRI and they told him he had disk problem and he need neuro follow up and provided with muscle relaxant and narcotic for pain control. Patient stated the medicine is gone and the ain is worse with numbness in his left leg. He is not working for the last month. Denied any trauma, recent infection, surgery, foreign body, recent IV drug abuse. Also denied any fever, mekhi loss, fecal or urine incontinence, saddle anaesthesia.          Hospital Course:  11/14: NSGY following             Blood cultures NGTD, Urine cultures pending.              CT lumbar spine concerning for spondylodiscitis.  11/15: afebrile overnight, blood CX NGTD, NSGY stated no indications for NSGY interventions this admission, they want follow him up as outpatient after after Abx. We will pursue IR for disk Bx/aspirate for Dx.     11/16: NAEON. Vitals stable. Pending disc aspiration via IR tomorrow.  11/17: NAEON. Vitals stable. Planned for L4-L5 biopsy today.  11/18: NAEON. Vitals stable. S/p L4-L5 disc aspiration. Cultures, pathology report pending.  11/19: NAEON. Vitals stable.S/p L4-L5 disc aspiration. Cultures, pathology report pending.  11/20: NAEON. Vitals stable. Pathology showing acute and chronic OM. Cultures remain  negative.  11/21: NAEON. Vitals stable. Patient with trouble sleeping last night with pain in the buttocks and back as well as numbness in the toes. Pt still without adequate pain control.  11/22: NAEON. VSS. Patient with increased pain issues today with pain radiating from the back down to his feet.   11/22: NAEON. VSS. Patient with better pain control today.  11/23: NAEON. VSS. Patient with better pain control today.  11/24: NAEON. VSS. Patient asking for increased frequency of pain meds today.  11/27: NAEON. VSS.   11/28: NAEON. VSS. Patient with spasms still but no other complaints.  11/29: NAEON. VSS. Patient with spasms but no other complaints.    Interval History: See hospital course above.    Review of Systems   Constitutional: Negative for chills, fever and unexpected weight change.   HENT: Negative for hearing loss.    Eyes: Negative for visual disturbance.   Respiratory: Negative for shortness of breath.    Cardiovascular: Negative for chest pain.   Gastrointestinal: Negative for abdominal pain, diarrhea, nausea and vomiting.   Genitourinary: Negative for dysuria.   Musculoskeletal: Positive for back pain. Negative for arthralgias.        Back spasms   Skin: Negative for rash.   Neurological: Positive for numbness (toes). Negative for seizures.   Hematological: Negative for adenopathy. Does not bruise/bleed easily.     Objective:     Vital Signs (Most Recent):  Temp: 97.5 °F (36.4 °C) (11/29/17 0741)  Pulse: 107 (11/29/17 0741)  Resp: 17 (11/29/17 0741)  BP: 131/76 (11/29/17 0741)  SpO2: 97 % (11/29/17 0741) Vital Signs (24h Range):  Temp:  [97.5 °F (36.4 °C)-98.2 °F (36.8 °C)] 97.5 °F (36.4 °C)  Pulse:  [] 107  Resp:  [17-20] 17  SpO2:  [97 %-100 %] 97 %  BP: (105-141)/(55-76) 131/76     Weight: 90.2 kg (198 lb 13.7 oz)  Body mass index is 25.53 kg/m².    Intake/Output Summary (Last 24 hours) at 11/29/17 1025  Last data filed at 11/28/17 1700   Gross per 24 hour   Intake              750 ml    Output             1400 ml   Net             -650 ml      Physical Exam   Constitutional: He appears well-developed and well-nourished.   HENT:   Head: Normocephalic and atraumatic.   Eyes: EOM are normal. Pupils are equal, round, and reactive to light.   Neck: No tracheal deviation present. No thyromegaly present.   Cardiovascular: Normal rate.    No murmur heard.  Pulmonary/Chest: Effort normal and breath sounds normal. He has no wheezes. He has no rales.   Abdominal: There is no tenderness. No hernia.   Musculoskeletal: He exhibits tenderness (lumbar spine and down both legs). He exhibits no edema.   Neurological: A sensory deficit (BL toes.) is present. No cranial nerve deficit. He exhibits normal muscle tone.   Skin: Skin is warm. No rash noted.   Psychiatric: He has a normal mood and affect.       Significant Labs:   CBC:     Recent Labs  Lab 11/28/17  0436   WBC 5.27   HGB 10.1*   HCT 32.4*        CMP:     Recent Labs  Lab 11/28/17  0436      K 4.4      CO2 28   GLU 92   BUN 12   CREATININE 0.9   CALCIUM 9.3   ANIONGAP 9   EGFRNONAA >60.0       Significant Imaging: I have reviewed all pertinent imaging results/findings within the past 24 hours.    Assessment/Plan:      * Discitis of lumbar region    - Stable  - NSGY and ID following, appreciate assistance  - CT lumbar spine concerning for spondylodiscitis at L4-L5, now s/p aspiration with path showing acute and chronic OM, though blood and urine cultures remain negative to date.  - ESR, CRP mildly elevated earlier this admission.   - Procal normal  - HIV Abs negative  - Pain control as described above  - PT/OT following  - Patient on ceftriaxone and vancomycin as ID's recommendation- desire 6-8 weeks of tx- end date Jan 1st at earliest   - Will plan for follow up in NSGY clinic once ABX are done with MRI w/wo contrast & infectious labs.   - Plan to discharge to LTAC in the interim to complete IV antibiotics given substance abuse  history  - Vancomycin dose looking good based on troughs        Acute bilateral low back pain with bilateral sciatica    - Stable  - Patient with discitis and acute on chronic osteomyelitis of lumbar region. Pain mostly unchanged from yesterday.  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, which complicates our discharge planning and pain control regimen  - Symptomatic treatment with new pain regiment- gabapentin 900mg TID, Ibprofen 600mg Q6, flexeril 10mg TID PRN, and PRN Norco 10mg-325mg for break-through pain.   - For spasms- added heat packs and methocarbamol  - Continue to monitor            History of substance abuse    - Admission drug screen positive for opiates (prescribed to him as an outpatient), but also benzodiazepines, THC, and cocaine  - Not a candidate for PICC placement, so pursuing LTAC placement for long-term IV antibiotics            VTE Risk Mitigation         Ordered     enoxaparin injection 40 mg  Daily     Route:  Subcutaneous        11/20/17 1617     Medium Risk of VTE  Once      11/13/17 1910              Ron Dubose MD  Department of Hospital Medicine   [facility]

## 2017-11-29 NOTE — PLAN OF CARE
Problem: Patient Care Overview  Goal: Plan of Care Review  Outcome: Ongoing (interventions implemented as appropriate)  Pt remained free from falls. VSS. Call light and personal items in reach. Pt expecting to go to LTAC today.

## 2017-11-29 NOTE — SUBJECTIVE & OBJECTIVE
Interval History: See hospital course above.    Review of Systems   Constitutional: Negative for chills, fever and unexpected weight change.   HENT: Negative for hearing loss.    Eyes: Negative for visual disturbance.   Respiratory: Negative for shortness of breath.    Cardiovascular: Negative for chest pain.   Gastrointestinal: Negative for abdominal pain, diarrhea, nausea and vomiting.   Genitourinary: Negative for dysuria.   Musculoskeletal: Positive for back pain. Negative for arthralgias.        Back spasms   Skin: Negative for rash.   Neurological: Positive for numbness (toes). Negative for seizures.   Hematological: Negative for adenopathy. Does not bruise/bleed easily.     Objective:     Vital Signs (Most Recent):  Temp: 97.5 °F (36.4 °C) (11/29/17 0741)  Pulse: 107 (11/29/17 0741)  Resp: 17 (11/29/17 0741)  BP: 131/76 (11/29/17 0741)  SpO2: 97 % (11/29/17 0741) Vital Signs (24h Range):  Temp:  [97.5 °F (36.4 °C)-98.2 °F (36.8 °C)] 97.5 °F (36.4 °C)  Pulse:  [] 107  Resp:  [17-20] 17  SpO2:  [97 %-100 %] 97 %  BP: (105-141)/(55-76) 131/76     Weight: 90.2 kg (198 lb 13.7 oz)  Body mass index is 25.53 kg/m².    Intake/Output Summary (Last 24 hours) at 11/29/17 1025  Last data filed at 11/28/17 1700   Gross per 24 hour   Intake              750 ml   Output             1400 ml   Net             -650 ml      Physical Exam   Constitutional: He appears well-developed and well-nourished.   HENT:   Head: Normocephalic and atraumatic.   Eyes: EOM are normal. Pupils are equal, round, and reactive to light.   Neck: No tracheal deviation present. No thyromegaly present.   Cardiovascular: Normal rate.    No murmur heard.  Pulmonary/Chest: Effort normal and breath sounds normal. He has no wheezes. He has no rales.   Abdominal: There is no tenderness. No hernia.   Musculoskeletal: He exhibits tenderness (lumbar spine and down both legs). He exhibits no edema.   Neurological: A sensory deficit (BL toes.) is present.  No cranial nerve deficit. He exhibits normal muscle tone.   Skin: Skin is warm. No rash noted.   Psychiatric: He has a normal mood and affect.       Significant Labs:   CBC:     Recent Labs  Lab 11/28/17 0436   WBC 5.27   HGB 10.1*   HCT 32.4*        CMP:     Recent Labs  Lab 11/28/17 0436      K 4.4      CO2 28   GLU 92   BUN 12   CREATININE 0.9   CALCIUM 9.3   ANIONGAP 9   EGFRNONAA >60.0       Significant Imaging: I have reviewed all pertinent imaging results/findings within the past 24 hours.

## 2017-11-29 NOTE — ASSESSMENT & PLAN NOTE
- Stable  - Patient with discitis and acute on chronic osteomyelitis of lumbar region. Pain mostly unchanged from yesterday.  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, which complicates our discharge planning and pain control regimen  - Symptomatic treatment with new pain regiment- gabapentin 900mg TID, Ibprofen 600mg Q6, flexeril 10mg TID PRN, and PRN Norco 10mg-325mg for break-through pain.   - For spasms- added heat packs and methocarbamol  - Continue to monitor

## 2017-11-30 LAB
ANION GAP SERPL CALC-SCNC: 8 MMOL/L
BASOPHILS # BLD AUTO: 0.03 K/UL
BASOPHILS NFR BLD: 0.7 %
BUN SERPL-MCNC: 14 MG/DL
CALCIUM SERPL-MCNC: 9.7 MG/DL
CHLORIDE SERPL-SCNC: 102 MMOL/L
CO2 SERPL-SCNC: 28 MMOL/L
CREAT SERPL-MCNC: 0.8 MG/DL
DIFFERENTIAL METHOD: ABNORMAL
EOSINOPHIL # BLD AUTO: 0.1 K/UL
EOSINOPHIL NFR BLD: 2.1 %
ERYTHROCYTE [DISTWIDTH] IN BLOOD BY AUTOMATED COUNT: 14.7 %
EST. GFR  (AFRICAN AMERICAN): >60 ML/MIN/1.73 M^2
EST. GFR  (NON AFRICAN AMERICAN): >60 ML/MIN/1.73 M^2
GLUCOSE SERPL-MCNC: 88 MG/DL
HCT VFR BLD AUTO: 34.6 %
HGB BLD-MCNC: 10.8 G/DL
IMM GRANULOCYTES # BLD AUTO: 0.02 K/UL
IMM GRANULOCYTES NFR BLD AUTO: 0.5 %
LYMPHOCYTES # BLD AUTO: 1.6 K/UL
LYMPHOCYTES NFR BLD: 36.5 %
MCH RBC QN AUTO: 26.9 PG
MCHC RBC AUTO-ENTMCNC: 31.2 G/DL
MCV RBC AUTO: 86 FL
MONOCYTES # BLD AUTO: 0.9 K/UL
MONOCYTES NFR BLD: 19.8 %
NEUTROPHILS # BLD AUTO: 1.7 K/UL
NEUTROPHILS NFR BLD: 40.4 %
NRBC BLD-RTO: 0 /100 WBC
PLATELET # BLD AUTO: 258 K/UL
PMV BLD AUTO: 10.1 FL
POTASSIUM SERPL-SCNC: 4.4 MMOL/L
RBC # BLD AUTO: 4.02 M/UL
SODIUM SERPL-SCNC: 138 MMOL/L
VANCOMYCIN TROUGH SERPL-MCNC: 16 UG/ML
WBC # BLD AUTO: 4.3 K/UL

## 2017-11-30 PROCEDURE — 85025 COMPLETE CBC W/AUTO DIFF WBC: CPT

## 2017-11-30 PROCEDURE — 97803 MED NUTRITION INDIV SUBSEQ: CPT

## 2017-11-30 PROCEDURE — 20600001 HC STEP DOWN PRIVATE ROOM

## 2017-11-30 PROCEDURE — 25000003 PHARM REV CODE 250: Performed by: STUDENT IN AN ORGANIZED HEALTH CARE EDUCATION/TRAINING PROGRAM

## 2017-11-30 PROCEDURE — 36415 COLL VENOUS BLD VENIPUNCTURE: CPT

## 2017-11-30 PROCEDURE — 63600175 PHARM REV CODE 636 W HCPCS: Performed by: INTERNAL MEDICINE

## 2017-11-30 PROCEDURE — 99231 SBSQ HOSP IP/OBS SF/LOW 25: CPT | Mod: ,,, | Performed by: HOSPITALIST

## 2017-11-30 PROCEDURE — 25000003 PHARM REV CODE 250: Performed by: INTERNAL MEDICINE

## 2017-11-30 PROCEDURE — 80202 ASSAY OF VANCOMYCIN: CPT

## 2017-11-30 PROCEDURE — 80048 BASIC METABOLIC PNL TOTAL CA: CPT

## 2017-11-30 RX ORDER — CYCLOBENZAPRINE HCL 5 MG
10 TABLET ORAL 3 TIMES DAILY PRN
Status: DISCONTINUED | OUTPATIENT
Start: 2017-11-30 | End: 2017-12-01

## 2017-11-30 RX ADMIN — HYDROCODONE BITARTRATE AND ACETAMINOPHEN 1 TABLET: 10; 325 TABLET ORAL at 08:11

## 2017-11-30 RX ADMIN — GABAPENTIN 900 MG: 300 CAPSULE ORAL at 10:11

## 2017-11-30 RX ADMIN — METHOCARBAMOL 1500 MG: 500 TABLET ORAL at 05:11

## 2017-11-30 RX ADMIN — CYCLOBENZAPRINE HYDROCHLORIDE 10 MG: 5 TABLET, FILM COATED ORAL at 03:11

## 2017-11-30 RX ADMIN — GABAPENTIN 900 MG: 300 CAPSULE ORAL at 03:11

## 2017-11-30 RX ADMIN — CYCLOBENZAPRINE HYDROCHLORIDE 10 MG: 5 TABLET, FILM COATED ORAL at 11:11

## 2017-11-30 RX ADMIN — VANCOMYCIN HYDROCHLORIDE 1750 MG: 10 INJECTION, POWDER, LYOPHILIZED, FOR SOLUTION INTRAVENOUS at 10:11

## 2017-11-30 RX ADMIN — HYDROCODONE BITARTRATE AND ACETAMINOPHEN 1 TABLET: 10; 325 TABLET ORAL at 11:11

## 2017-11-30 RX ADMIN — STANDARDIZED SENNA CONCENTRATE AND DOCUSATE SODIUM 1 TABLET: 8.6; 5 TABLET, FILM COATED ORAL at 08:11

## 2017-11-30 RX ADMIN — IBUPROFEN 600 MG: 200 TABLET, FILM COATED ORAL at 03:11

## 2017-11-30 RX ADMIN — CEFEPIME 2 G: 2 INJECTION, POWDER, FOR SOLUTION INTRAVENOUS at 07:11

## 2017-11-30 RX ADMIN — VANCOMYCIN HYDROCHLORIDE 1750 MG: 10 INJECTION, POWDER, LYOPHILIZED, FOR SOLUTION INTRAVENOUS at 06:11

## 2017-11-30 RX ADMIN — ENOXAPARIN SODIUM 40 MG: 100 INJECTION SUBCUTANEOUS at 07:11

## 2017-11-30 RX ADMIN — IBUPROFEN 600 MG: 200 TABLET, FILM COATED ORAL at 10:11

## 2017-11-30 RX ADMIN — GABAPENTIN 900 MG: 300 CAPSULE ORAL at 05:11

## 2017-11-30 RX ADMIN — HYDROCODONE BITARTRATE AND ACETAMINOPHEN 1 TABLET: 10; 325 TABLET ORAL at 05:11

## 2017-11-30 RX ADMIN — VANCOMYCIN HYDROCHLORIDE 1750 MG: 10 INJECTION, POWDER, LYOPHILIZED, FOR SOLUTION INTRAVENOUS at 02:11

## 2017-11-30 RX ADMIN — IBUPROFEN 600 MG: 200 TABLET, FILM COATED ORAL at 08:11

## 2017-11-30 NOTE — PLAN OF CARE
Problem: Patient Care Overview  Goal: Plan of Care Review  Outcome: Ongoing (interventions implemented as appropriate)  Daily udpate      Comments: Pt vitals stable, up ad nae. Pt c/o pain to lower back. Pain lessened by antispasmodics and pain relievers. No changes.

## 2017-11-30 NOTE — PLAN OF CARE
"All 5 SNF referrals have been denied per [employer] due to "payor not accepted." Sw to inform CM to contact insurance and ask for auth to LTAC at [facility] or [facility].   "

## 2017-11-30 NOTE — SUBJECTIVE & OBJECTIVE
Interval History: See hospital course above.    Review of Systems   Constitutional: Negative for chills, fever and unexpected weight change.   HENT: Negative for hearing loss.    Eyes: Negative for visual disturbance.   Respiratory: Negative for shortness of breath.    Cardiovascular: Negative for chest pain.   Gastrointestinal: Negative for abdominal pain, diarrhea, nausea and vomiting.   Genitourinary: Negative for dysuria.   Musculoskeletal: Positive for back pain. Negative for arthralgias.        Back spasms   Skin: Negative for rash.   Neurological: Positive for numbness (toes). Negative for seizures.   Hematological: Negative for adenopathy. Does not bruise/bleed easily.     Objective:     Vital Signs (Most Recent):  Temp: 98.2 °F (36.8 °C) (11/30/17 0819)  Pulse: 84 (11/30/17 0819)  Resp: 16 (11/30/17 0819)  BP: 130/69 (11/30/17 0819)  SpO2: 99 % (11/30/17 0819) Vital Signs (24h Range):  Temp:  [96.1 °F (35.6 °C)-98.2 °F (36.8 °C)] 98.2 °F (36.8 °C)  Pulse:  [79-99] 84  Resp:  [16-18] 16  SpO2:  [98 %-100 %] 99 %  BP: ()/(43-77) 130/69     Weight: 90.2 kg (198 lb 13.7 oz)  Body mass index is 25.53 kg/m².    Intake/Output Summary (Last 24 hours) at 11/30/17 1030  Last data filed at 11/30/17 0619   Gross per 24 hour   Intake             6900 ml   Output                0 ml   Net             6900 ml      Physical Exam   Constitutional: He appears well-developed and well-nourished.   HENT:   Head: Normocephalic and atraumatic.   Eyes: EOM are normal. Pupils are equal, round, and reactive to light.   Neck: No tracheal deviation present. No thyromegaly present.   Cardiovascular: Normal rate.    No murmur heard.  Pulmonary/Chest: Effort normal and breath sounds normal. He has no wheezes. He has no rales.   Abdominal: There is no tenderness. No hernia.   Musculoskeletal: He exhibits tenderness (lumbar spine and down both legs). He exhibits no edema.   Neurological: A sensory deficit (BL toes.) is present. No  cranial nerve deficit. He exhibits normal muscle tone.   Skin: Skin is warm. No rash noted.   Psychiatric: He has a normal mood and affect.       Significant Labs:   CBC:     Recent Labs  Lab 11/30/17  0546   WBC 4.30   HGB 10.8*   HCT 34.6*        CMP:     Recent Labs  Lab 11/30/17  0546      K 4.4      CO2 28   GLU 88   BUN 14   CREATININE 0.8   CALCIUM 9.7   ANIONGAP 8   EGFRNONAA >60.0       Significant Imaging: I have reviewed all pertinent imaging results/findings within the past 24 hours.

## 2017-11-30 NOTE — PROGRESS NOTES
"  [facility]  Adult Nutrition  Consult Note    SUMMARY     Recommendations    1. Continue current regular diet.   2. RD to monitor & follow-up.    Goals: PO intake >50%  Nutrition Goal Status: new  Communication of RD Recs: reviewed with RN    Reason for Assessment    Reason for Assessment: RD follow-up  Diagnosis: other (see comments) (Back pain)  Relevent Medical History: No sign. PMH   Interdisciplinary Rounds: did not attend     General Information Comments: Pt with good appetite, consuming 100% of meals.  Nutrition Discharge Planning: Adequate PO intake    Nutrition Prescription Ordered    Current Diet Order: Regular    Nutrition/Diet History    Patient Reported Diet/Restrictions/Preferences: general     Factors Affecting Nutritional Intake: other (see comments) (None)    Labs/Tests/Procedures/Meds    Pertinent Labs Reviewed: reviewed, pertinent  Pertinent Labs Comments: Stable  Pertinent Medications Reviewed: reviewed, pertinent    Physical Findings    Overall Physical Appearance: nourished  Oral/Mouth Cavity: WDL  Skin: other (see comments) (Incision - back)    Anthropometrics    Height: 6' 2" (188 cm)  Weight Method: Bed Scale  Weight: 90.2 kg (198 lb 13.7 oz)    Ideal Body Weight (IBW), Male: 190 lb  % Ideal Body Weight, Male (lb): 104.66 lb     BMI (Calculated): 25.6  BMI Grade: 25 - 29.9 - overweight    Estimated/Assessed Needs    Weight Used For Calorie Calculations: 90.2 kg (198 lb 13.7 oz)      Energy Calorie Requirements (kcal): 2300 kcal/d  Energy Need Method: [place]-St Jeor (1.2 PAL)     Weight Used For Protein Calculations: 90.2 kg (198 lb 13.7 oz)  Protein Requirements: 90 g/d (1 g/kg)     Fluid Need Method: RDA Method, other (see comments) (Per MD or 1 mL/kcal)    Assessment and Plan    No nutritional dx at this time.    Monitor and Evaluation    Food and Nutrient Intake: energy intake, food and beverage intake  Food and Nutrient Adminstration: diet order     Physical " Activity and Function: nutrition-related ADLs and IADLs  Anthropometric Measurements: weight, weight change  Biochemical Data, Medical Tests and Procedures: lipid profile, gastrointestinal profile, glucose/endocrine profile, inflammatory profile, electrolyte and renal panel  Nutrition-Focused Physical Findings: overall appearance    Nutrition Risk    Level of Risk: other (see comments) (1x/week)    Nutrition Follow-Up    RD Follow-up?: Yes

## 2017-11-30 NOTE — PLAN OF CARE
Problem: Patient Care Overview  Goal: Plan of Care Review  No acute changes overnight. Pts pain well controlled with PRN medications. Ibuprofen administered before opioids per MD order. Pt continues on IV abx. Remains afebrile. No other issues noted. D/c pending LTAC placement. Will continue to monitor.

## 2017-11-30 NOTE — PROGRESS NOTES
[facility] Hospital Medicine  Progress Note    Patient Name: Evgeny Wilde  MRN: [id number]  Patient Class: IP- Inpatient   Admission Date: 11/13/2017  Length of Stay: 17 days  Attending Physician: Sara Arauz MD  Primary Care Provider: Primary Doctor [facility] Medicine Team: Networked reference to record PCT  Ron Dubose MD    Subjective:     Principal Problem:Discitis of lumbar region    HPI:  32 years old male with no significant past medical Hx, present to the ED with progressive lower back pain with sciatica for the last month. He works on constructions, pain start suddenly 1 month ago, at the lower back, dull, aggravated with movements. No inciting factors. [facility] to [facility], he had CT and MRI and they told him he had disk problem and he need neuro follow up and provided with muscle relaxant and narcotic for pain control. Patient stated the medicine is gone and the ain is worse with numbness in his left leg. He is not working for the last month. Denied any trauma, recent infection, surgery, foreign body, recent IV drug abuse. Also denied any fever, mekhi loss, fecal or urine incontinence, saddle anaesthesia.          Hospital Course:  11/14: NSGY following             Blood cultures NGTD, Urine cultures pending.              CT lumbar spine concerning for spondylodiscitis.  11/15: afebrile overnight, blood CX NGTD, NSGY stated no indications for NSGY interventions this admission, they want follow him up as outpatient after after Abx. We will pursue IR for disk Bx/aspirate for Dx.     11/16: NAEON. Vitals stable. Pending disc aspiration via IR tomorrow.  11/17: NAEON. Vitals stable. Planned for L4-L5 biopsy today.  11/18: NAEON. Vitals stable. S/p L4-L5 disc aspiration. Cultures, pathology report pending.  11/19: NAEON. Vitals stable.S/p L4-L5 disc aspiration. Cultures, pathology report pending.  11/20: NAEON. Vitals stable. Pathology showing acute and chronic OM. Cultures  remain negative.  11/21: NAEON. Vitals stable. Patient with trouble sleeping last night with pain in the buttocks and back as well as numbness in the toes. Pt still without adequate pain control.  11/22: NAEON. VSS. Patient with increased pain issues today with pain radiating from the back down to his feet.   11/22: NAEON. VSS. Patient with better pain control today.  11/23: NAEON. VSS. Patient with better pain control today.  11/24: NAEON. VSS. Patient asking for increased frequency of pain meds today.  11/27: NAEON. VSS.   11/28: NAEON. VSS. Patient with spasms still but no other complaints.  11/29: NAEON. VSS. Patient with spasms but no other complaints.  11/20: NAEON. VSS. Patient with spasms but no other complaints.    Interval History: See hospital course above.    Review of Systems   Constitutional: Negative for chills, fever and unexpected weight change.   HENT: Negative for hearing loss.    Eyes: Negative for visual disturbance.   Respiratory: Negative for shortness of breath.    Cardiovascular: Negative for chest pain.   Gastrointestinal: Negative for abdominal pain, diarrhea, nausea and vomiting.   Genitourinary: Negative for dysuria.   Musculoskeletal: Positive for back pain. Negative for arthralgias.        Back spasms   Skin: Negative for rash.   Neurological: Positive for numbness (toes). Negative for seizures.   Hematological: Negative for adenopathy. Does not bruise/bleed easily.     Objective:     Vital Signs (Most Recent):  Temp: 98.2 °F (36.8 °C) (11/30/17 0819)  Pulse: 84 (11/30/17 0819)  Resp: 16 (11/30/17 0819)  BP: 130/69 (11/30/17 0819)  SpO2: 99 % (11/30/17 0819) Vital Signs (24h Range):  Temp:  [96.1 °F (35.6 °C)-98.2 °F (36.8 °C)] 98.2 °F (36.8 °C)  Pulse:  [79-99] 84  Resp:  [16-18] 16  SpO2:  [98 %-100 %] 99 %  BP: ()/(43-77) 130/69     Weight: 90.2 kg (198 lb 13.7 oz)  Body mass index is 25.53 kg/m².    Intake/Output Summary (Last 24 hours) at 11/30/17 1030  Last data filed at  11/30/17 0619   Gross per 24 hour   Intake             6900 ml   Output                0 ml   Net             6900 ml      Physical Exam   Constitutional: He appears well-developed and well-nourished.   HENT:   Head: Normocephalic and atraumatic.   Eyes: EOM are normal. Pupils are equal, round, and reactive to light.   Neck: No tracheal deviation present. No thyromegaly present.   Cardiovascular: Normal rate.    No murmur heard.  Pulmonary/Chest: Effort normal and breath sounds normal. He has no wheezes. He has no rales.   Abdominal: There is no tenderness. No hernia.   Musculoskeletal: He exhibits tenderness (lumbar spine and down both legs). He exhibits no edema.   Neurological: A sensory deficit (BL toes.) is present. No cranial nerve deficit. He exhibits normal muscle tone.   Skin: Skin is warm. No rash noted.   Psychiatric: He has a normal mood and affect.       Significant Labs:   CBC:     Recent Labs  Lab 11/30/17  0546   WBC 4.30   HGB 10.8*   HCT 34.6*        CMP:     Recent Labs  Lab 11/30/17  0546      K 4.4      CO2 28   GLU 88   BUN 14   CREATININE 0.8   CALCIUM 9.7   ANIONGAP 8   EGFRNONAA >60.0       Significant Imaging: I have reviewed all pertinent imaging results/findings within the past 24 hours.    Assessment/Plan:      * Discitis of lumbar region    - Stable  - NSGY and ID following, appreciate assistance  - CT lumbar spine concerning for spondylodiscitis at L4-L5, now s/p aspiration with path showing acute and chronic OM, though blood and urine cultures remain negative to date.  - ESR, CRP mildly elevated earlier this admission.   - Procal normal  - HIV Abs negative  - Pain control as described above  - PT/OT following  - Patient on ceftriaxone and vancomycin as ID's recommendation- desire 6-8 weeks of tx- end date Jan 1st at earliest   - Will plan for follow up in NSGY clinic once ABX are done with MRI w/wo contrast & infectious labs.   - Plan to discharge to LTAC in the  interim to complete IV antibiotics given substance abuse history  - Vancomycin dose looking good based on troughs        Acute bilateral low back pain with bilateral sciatica    - Stable  - Patient with discitis and acute on chronic osteomyelitis of lumbar region. Pain mostly unchanged from yesterday.  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, which complicates our discharge planning and pain control regimen  - Symptomatic treatment with new pain regiment- gabapentin 900mg TID, Ibprofen 600mg Q6, flexeril 10mg TID PRN, and PRN Norco 10mg-325mg for break-through pain.   - For spasms- added heat packs and methocarbamol  - Continue to monitor            History of substance abuse    - Admission drug screen positive for opiates (prescribed to him as an outpatient), but also benzodiazepines, THC, and cocaine  - Not a candidate for PICC placement, so pursuing LTAC placement for long-term IV antibiotics            VTE Risk Mitigation         Ordered     enoxaparin injection 40 mg  Daily     Route:  Subcutaneous        11/20/17 1617     Medium Risk of VTE  Once      11/13/17 1910              Ron Dubose MD  Department of Hospital Medicine   [facility]

## 2017-11-30 NOTE — PLAN OF CARE
Sw did upload SNF referrals per insurance to [facility], [facility], [facility], [facility], [facility], [facility], [facility], [facility], Rajesh to follow with all above.

## 2017-11-30 NOTE — PLAN OF CARE
Call placed to Krysten at [facility] ([phone], ext: [phone]) to inform her that all 5 SNF referrals have been denied. This CM asked if authorization could be given to an LTAC. Informed by Krysten that Mr. Wilde does not meet criteria for an LTAC. She then gave this CM additional SNF choices. SW notified of above conversation.    Keisha Cadena RN  Ext [phone]

## 2017-12-01 PROCEDURE — 25000003 PHARM REV CODE 250: Performed by: STUDENT IN AN ORGANIZED HEALTH CARE EDUCATION/TRAINING PROGRAM

## 2017-12-01 PROCEDURE — 63600175 PHARM REV CODE 636 W HCPCS: Performed by: INTERNAL MEDICINE

## 2017-12-01 PROCEDURE — 99231 SBSQ HOSP IP/OBS SF/LOW 25: CPT | Mod: ,,, | Performed by: HOSPITALIST

## 2017-12-01 PROCEDURE — 25000003 PHARM REV CODE 250: Performed by: INTERNAL MEDICINE

## 2017-12-01 PROCEDURE — 20600001 HC STEP DOWN PRIVATE ROOM

## 2017-12-01 RX ORDER — CYCLOBENZAPRINE HCL 5 MG
10 TABLET ORAL EVERY 8 HOURS PRN
Status: DISCONTINUED | OUTPATIENT
Start: 2017-12-01 | End: 2017-12-04

## 2017-12-01 RX ORDER — PREDNISONE 20 MG/1
20 TABLET ORAL DAILY
Status: DISCONTINUED | OUTPATIENT
Start: 2017-12-01 | End: 2017-12-01

## 2017-12-01 RX ADMIN — HYDROCODONE BITARTRATE AND ACETAMINOPHEN 1 TABLET: 10; 325 TABLET ORAL at 12:12

## 2017-12-01 RX ADMIN — GABAPENTIN 900 MG: 300 CAPSULE ORAL at 02:12

## 2017-12-01 RX ADMIN — CYCLOBENZAPRINE HYDROCHLORIDE 10 MG: 5 TABLET, FILM COATED ORAL at 05:12

## 2017-12-01 RX ADMIN — GABAPENTIN 900 MG: 300 CAPSULE ORAL at 09:12

## 2017-12-01 RX ADMIN — IBUPROFEN 600 MG: 200 TABLET, FILM COATED ORAL at 05:12

## 2017-12-01 RX ADMIN — ENOXAPARIN SODIUM 40 MG: 100 INJECTION SUBCUTANEOUS at 05:12

## 2017-12-01 RX ADMIN — HYDROCODONE BITARTRATE AND ACETAMINOPHEN 1 TABLET: 10; 325 TABLET ORAL at 07:12

## 2017-12-01 RX ADMIN — STANDARDIZED SENNA CONCENTRATE AND DOCUSATE SODIUM 1 TABLET: 8.6; 5 TABLET, FILM COATED ORAL at 09:12

## 2017-12-01 RX ADMIN — IBUPROFEN 600 MG: 200 TABLET, FILM COATED ORAL at 02:12

## 2017-12-01 RX ADMIN — HYDROCODONE BITARTRATE AND ACETAMINOPHEN 1 TABLET: 10; 325 TABLET ORAL at 06:12

## 2017-12-01 RX ADMIN — GABAPENTIN 900 MG: 300 CAPSULE ORAL at 06:12

## 2017-12-01 RX ADMIN — IBUPROFEN 600 MG: 200 TABLET, FILM COATED ORAL at 09:12

## 2017-12-01 RX ADMIN — VANCOMYCIN HYDROCHLORIDE 1750 MG: 10 INJECTION, POWDER, LYOPHILIZED, FOR SOLUTION INTRAVENOUS at 02:12

## 2017-12-01 RX ADMIN — VANCOMYCIN HYDROCHLORIDE 1750 MG: 10 INJECTION, POWDER, LYOPHILIZED, FOR SOLUTION INTRAVENOUS at 09:12

## 2017-12-01 RX ADMIN — CEFEPIME 2 G: 2 INJECTION, POWDER, FOR SOLUTION INTRAVENOUS at 05:12

## 2017-12-01 RX ADMIN — VANCOMYCIN HYDROCHLORIDE 1750 MG: 10 INJECTION, POWDER, LYOPHILIZED, FOR SOLUTION INTRAVENOUS at 08:12

## 2017-12-01 RX ADMIN — STANDARDIZED SENNA CONCENTRATE AND DOCUSATE SODIUM 1 TABLET: 8.6; 5 TABLET, FILM COATED ORAL at 08:12

## 2017-12-01 RX ADMIN — HYDROCODONE BITARTRATE AND ACETAMINOPHEN 1 TABLET: 10; 325 TABLET ORAL at 03:12

## 2017-12-01 RX ADMIN — HYDROCODONE BITARTRATE AND ACETAMINOPHEN 1 TABLET: 10; 325 TABLET ORAL at 10:12

## 2017-12-01 RX ADMIN — CYCLOBENZAPRINE HYDROCHLORIDE 10 MG: 5 TABLET, FILM COATED ORAL at 08:12

## 2017-12-01 NOTE — PROGRESS NOTES
[facility] Hospital Medicine  Progress Note    Patient Name: Evgeny Wilde  MRN: [id number]  Patient Class: IP- Inpatient   Admission Date: 11/13/2017  Length of Stay: 18 days  Attending Physician: Tanisha Neff MD  Primary Care Provider: Primary Doctor No    Hospital Medicine Team: [facility] Uriah Perez MD    Subjective:     Principal Problem:Discitis of lumbar region    HPI:  32 years old male with no significant past medical Hx, present to the ED with progressive lower back pain with sciatica for the last month. He works on constructions, pain start suddenly 1 month ago, at the lower back, dull, aggravated with movements. No inciting factors. Went to [facility], he had CT and MRI and they told him he had disk problem and he need neuro follow up and provided with muscle relaxant and narcotic for pain control. Patient stated the medicine is gone and the ain is worse with numbness in his left leg. He is not working for the last month. Denied any trauma, recent infection, surgery, foreign body, recent IV drug abuse. Also denied any fever, mekhi loss, fecal or urine incontinence, saddle anaesthesia.          Hospital Course:  11/14: NSGY following             Blood cultures NGTD, Urine cultures pending.              CT lumbar spine concerning for spondylodiscitis.  11/15: afebrile overnight, blood CX NGTD, NSGY stated no indications for NSGY interventions this admission, they want follow him up as outpatient after after Abx. We will pursue IR for disk Bx/aspirate for Dx.     11/16: NAEON. Vitals stable. Pending disc aspiration via IR tomorrow.  11/17: NAEON. Vitals stable. Planned for L4-L5 biopsy today.  11/18: NAEON. Vitals stable. S/p L4-L5 disc aspiration. Cultures, pathology report pending.  11/19: NAEON. Vitals stable.S/p L4-L5 disc aspiration. Cultures, pathology report pending.  11/20: NAEON. Vitals stable. Pathology showing acute and chronic OM. Cultures remain  negative.  11/21: NAEON. Vitals stable. Patient with trouble sleeping last night with pain in the buttocks and back as well as numbness in the toes. Pt still without adequate pain control.  11/22: NAEON. VSS. Patient with increased pain issues today with pain radiating from the back down to his feet.   11/22: NAEON. VSS. Patient with better pain control today.  11/23: NAEON. VSS. Patient with better pain control today.  11/24: NAEON. VSS. Patient asking for increased frequency of pain meds today.  11/27: NAEON. VSS.   11/28: NAEON. VSS. Patient with spasms still but no other complaints.  11/29: NAEON. VSS. Patient with spasms but no other complaints.  11/20: NAEON. VSS. Patient with spasms but no other complaints.    Interval History:     Mr. Wilde was stable this morning relative to previous examination. He has continued intermittent back pain responsive to flexeril and his current pain regimen, though he was requesting changing the schedule.    Review of Systems   Constitutional: Negative for chills, fever and unexpected weight change.   HENT: Negative for hearing loss.    Eyes: Negative for visual disturbance.   Respiratory: Negative for shortness of breath.    Cardiovascular: Negative for chest pain.   Gastrointestinal: Negative for abdominal pain, diarrhea, nausea and vomiting.   Genitourinary: Negative for dysuria.   Musculoskeletal: Positive for back pain. Negative for arthralgias.        Back spasms   Skin: Negative for rash.   Neurological: Positive for numbness (toes). Negative for seizures.   Hematological: Negative for adenopathy. Does not bruise/bleed easily.     Objective:     Vital Signs (Most Recent):  Temp: 98.4 °F (36.9 °C) (12/01/17 0835)  Pulse: (!) 119 (12/01/17 0835)  Resp: 16 (12/01/17 0835)  BP: 121/63 (12/01/17 0835)  SpO2: 100 % (12/01/17 0835) Vital Signs (24h Range):  Temp:  [98 °F (36.7 °C)-98.4 °F (36.9 °C)] 98.4 °F (36.9 °C)  Pulse:  [] 119  Resp:  [16-18] 16  SpO2:  [94 %-100 %]  100 %  BP: (115-125)/(63-75) 121/63     Weight: 90.2 kg (198 lb 13.7 oz)  Body mass index is 25.53 kg/m².    Intake/Output Summary (Last 24 hours) at 12/01/17 1338  Last data filed at 12/01/17 0818   Gross per 24 hour   Intake             2250 ml   Output             1500 ml   Net              750 ml      Physical Exam   Constitutional: He appears well-developed and well-nourished.   HENT:   Head: Normocephalic and atraumatic.   Eyes: EOM are normal. Pupils are equal, round, and reactive to light.   Neck: No tracheal deviation present. No thyromegaly present.   Cardiovascular: Normal rate.    No murmur heard.  Pulmonary/Chest: Effort normal and breath sounds normal. He has no wheezes. He has no rales.   Abdominal: There is no tenderness. No hernia.   Musculoskeletal: He exhibits tenderness (lumbar spine and down both legs). He exhibits no edema.   Neurological: A sensory deficit (BL toes.) is present. No cranial nerve deficit. He exhibits normal muscle tone.   Skin: Skin is warm. No rash noted.   Psychiatric: He has a normal mood and affect.       Significant Labs:   CBC:    Recent Labs  Lab 11/30/17  0546   WBC 4.30   GRAN 40.4  1.7*   HGB 10.8*   HCT 34.6*          Chem 10:    Recent Labs  Lab 11/30/17  0546      K 4.4      CO2 28   BUN 14   CREATININE 0.8   GLU 88   CALCIUM 9.7       LFTs:  No results for input(s): ALKPHOS, BILITOT, AST, ALT, ALBUMIN, GGT in the last 48 hours.      Significant Imaging:   None new    Assessment/Plan:      * Discitis of lumbar region    - Stable  - NSGY and ID following, appreciate assistance  - CT lumbar spine concerning for spondylodiscitis at L4-L5, now s/p aspiration with path showing acute and chronic OM, though blood and urine cultures remain negative to date.  - ESR, CRP mildly elevated earlier this admission.   - Procal normal  - HIV Abs negative  - Pain control as described above  - PT/OT following  - Patient on ceftriaxone and vancomycin as ID's  recommendation- desire 6-8 weeks of tx- end date Jan 1st at earliest   - Will plan for follow up in NSGY clinic once ABX are done with MRI w/wo contrast & infectious labs.   - Plan to discharge to LTAC in the interim to complete IV antibiotics given substance abuse history  - Vancomycin dose looking good based on troughs        History of substance abuse    - Admission drug screen positive for opiates (prescribed to him as an outpatient), but also benzodiazepines, THC, and cocaine  - Not a candidate for PICC placement, so pursuing LTAC placement for long-term IV antibiotics          Acute bilateral low back pain with bilateral sciatica    - Stable  - Patient with discitis and acute on chronic osteomyelitis of lumbar region. Pain mostly unchanged from yesterday.  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, which complicates our discharge planning and pain control regimen  - Symptomatic treatment with new pain regiment- gabapentin 900mg TID, Ibprofen 600mg Q6, flexeril 10mg TID PRN, and PRN Norco 10mg-325mg for break-through pain.   - For spasms continue heat pack PRN and flexeril (changed to Q8H from TID per his request)  - Continue to monitor              VTE Risk Mitigation         Ordered     enoxaparin injection 40 mg  Daily     Route:  Subcutaneous        11/20/17 1617     Medium Risk of VTE  Once      11/13/17 1910          Uriah Perez MD  Department of Hospital Medicine   [facility]

## 2017-12-01 NOTE — ASSESSMENT & PLAN NOTE
- Stable  - Patient with discitis and acute on chronic osteomyelitis of lumbar region. Pain mostly unchanged from yesterday.  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, which complicates our discharge planning and pain control regimen  - Symptomatic treatment with new pain regiment- gabapentin 900mg TID, Ibprofen 600mg Q6, flexeril 10mg TID PRN, and PRN Norco 10mg-325mg for break-through pain.   - For spasms continue heat pack PRN and flexeril (changed to Q8H from TID per his request)  - Continue to monitor

## 2017-12-01 NOTE — PLAN OF CARE
"Yadira with [facility] informed Sw that "we are unable to meet the Pt's needs at this time. Sw to follow with other SNF referrals.   "

## 2017-12-01 NOTE — PLAN OF CARE
Problem: Patient Care Overview  Goal: Individualization & Mutuality  Outcome: Ongoing (interventions implemented as appropriate)  Pt remained LOC x 4 throughout the night.  See chart for V/S.  Had multiple complaints of pain and muscle spasms throughout shift.  Pt would be sleeping and upon waking him up he would begin to scream that he was in 10/10.  Medication was given as order for pain.  Pt left the unit 3 times overnight, and the last time was during change of shift and I was unable to hang his IV vanc at scheduled time.  He ambulated off the unit using a walker.  He remained free from fall or injury.

## 2017-12-01 NOTE — SUBJECTIVE & OBJECTIVE
Interval History:     Mr. Wilde was stable this morning relative to previous examination. He has continued intermittent back pain responsive to flexeril and his current pain regimen, though he was requesting changing the schedule.    Review of Systems   Constitutional: Negative for chills, fever and unexpected weight change.   HENT: Negative for hearing loss.    Eyes: Negative for visual disturbance.   Respiratory: Negative for shortness of breath.    Cardiovascular: Negative for chest pain.   Gastrointestinal: Negative for abdominal pain, diarrhea, nausea and vomiting.   Genitourinary: Negative for dysuria.   Musculoskeletal: Positive for back pain. Negative for arthralgias.        Back spasms   Skin: Negative for rash.   Neurological: Positive for numbness (toes). Negative for seizures.   Hematological: Negative for adenopathy. Does not bruise/bleed easily.     Objective:     Vital Signs (Most Recent):  Temp: 98.4 °F (36.9 °C) (12/01/17 0835)  Pulse: (!) 119 (12/01/17 0835)  Resp: 16 (12/01/17 0835)  BP: 121/63 (12/01/17 0835)  SpO2: 100 % (12/01/17 0835) Vital Signs (24h Range):  Temp:  [98 °F (36.7 °C)-98.4 °F (36.9 °C)] 98.4 °F (36.9 °C)  Pulse:  [] 119  Resp:  [16-18] 16  SpO2:  [94 %-100 %] 100 %  BP: (115-125)/(63-75) 121/63     Weight: 90.2 kg (198 lb 13.7 oz)  Body mass index is 25.53 kg/m².    Intake/Output Summary (Last 24 hours) at 12/01/17 1338  Last data filed at 12/01/17 0818   Gross per 24 hour   Intake             2250 ml   Output             1500 ml   Net              750 ml      Physical Exam   Constitutional: He appears well-developed and well-nourished.   HENT:   Head: Normocephalic and atraumatic.   Eyes: EOM are normal. Pupils are equal, round, and reactive to light.   Neck: No tracheal deviation present. No thyromegaly present.   Cardiovascular: Normal rate.    No murmur heard.  Pulmonary/Chest: Effort normal and breath sounds normal. He has no wheezes. He has no rales.   Abdominal:  There is no tenderness. No hernia.   Musculoskeletal: He exhibits tenderness (lumbar spine and down both legs). He exhibits no edema.   Neurological: A sensory deficit (BL toes.) is present. No cranial nerve deficit. He exhibits normal muscle tone.   Skin: Skin is warm. No rash noted.   Psychiatric: He has a normal mood and affect.       Significant Labs:   CBC:    Recent Labs  Lab 11/30/17  0546   WBC 4.30   GRAN 40.4  1.7*   HGB 10.8*   HCT 34.6*          Chem 10:    Recent Labs  Lab 11/30/17  0546      K 4.4      CO2 28   BUN 14   CREATININE 0.8   GLU 88   CALCIUM 9.7       LFTs:  No results for input(s): ALKPHOS, BILITOT, AST, ALT, ALBUMIN, GGT in the last 48 hours.      Significant Imaging:   None new

## 2017-12-02 LAB
ANION GAP SERPL CALC-SCNC: 12 MMOL/L
BASOPHILS # BLD AUTO: 0.02 K/UL
BASOPHILS NFR BLD: 0.4 %
BUN SERPL-MCNC: 14 MG/DL
CALCIUM SERPL-MCNC: 10.3 MG/DL
CHLORIDE SERPL-SCNC: 102 MMOL/L
CO2 SERPL-SCNC: 25 MMOL/L
CREAT SERPL-MCNC: 0.9 MG/DL
DIFFERENTIAL METHOD: ABNORMAL
EOSINOPHIL # BLD AUTO: 0.1 K/UL
EOSINOPHIL NFR BLD: 2.2 %
ERYTHROCYTE [DISTWIDTH] IN BLOOD BY AUTOMATED COUNT: 14.8 %
EST. GFR  (AFRICAN AMERICAN): >60 ML/MIN/1.73 M^2
EST. GFR  (NON AFRICAN AMERICAN): >60 ML/MIN/1.73 M^2
GLUCOSE SERPL-MCNC: 95 MG/DL
HCT VFR BLD AUTO: 37.2 %
HGB BLD-MCNC: 11.8 G/DL
IMM GRANULOCYTES # BLD AUTO: 0.02 K/UL
IMM GRANULOCYTES NFR BLD AUTO: 0.4 %
LYMPHOCYTES # BLD AUTO: 1.9 K/UL
LYMPHOCYTES NFR BLD: 40.3 %
MCH RBC QN AUTO: 26.6 PG
MCHC RBC AUTO-ENTMCNC: 31.7 G/DL
MCV RBC AUTO: 84 FL
MONOCYTES # BLD AUTO: 1 K/UL
MONOCYTES NFR BLD: 21.3 %
NEUTROPHILS # BLD AUTO: 1.6 K/UL
NEUTROPHILS NFR BLD: 35.4 %
NRBC BLD-RTO: 0 /100 WBC
PLATELET # BLD AUTO: 290 K/UL
PMV BLD AUTO: 10.1 FL
POTASSIUM SERPL-SCNC: 4.1 MMOL/L
RBC # BLD AUTO: 4.44 M/UL
SODIUM SERPL-SCNC: 139 MMOL/L
WBC # BLD AUTO: 4.64 K/UL

## 2017-12-02 PROCEDURE — 85025 COMPLETE CBC W/AUTO DIFF WBC: CPT

## 2017-12-02 PROCEDURE — 36415 COLL VENOUS BLD VENIPUNCTURE: CPT

## 2017-12-02 PROCEDURE — 25000003 PHARM REV CODE 250: Performed by: STUDENT IN AN ORGANIZED HEALTH CARE EDUCATION/TRAINING PROGRAM

## 2017-12-02 PROCEDURE — 99231 SBSQ HOSP IP/OBS SF/LOW 25: CPT | Mod: ,,, | Performed by: HOSPITALIST

## 2017-12-02 PROCEDURE — 25000003 PHARM REV CODE 250: Performed by: INTERNAL MEDICINE

## 2017-12-02 PROCEDURE — 63600175 PHARM REV CODE 636 W HCPCS: Performed by: INTERNAL MEDICINE

## 2017-12-02 PROCEDURE — 20600001 HC STEP DOWN PRIVATE ROOM

## 2017-12-02 PROCEDURE — 80048 BASIC METABOLIC PNL TOTAL CA: CPT

## 2017-12-02 RX ADMIN — HYDROCODONE BITARTRATE AND ACETAMINOPHEN 1 TABLET: 10; 325 TABLET ORAL at 09:12

## 2017-12-02 RX ADMIN — VANCOMYCIN HYDROCHLORIDE 1750 MG: 10 INJECTION, POWDER, LYOPHILIZED, FOR SOLUTION INTRAVENOUS at 06:12

## 2017-12-02 RX ADMIN — HYDROCODONE BITARTRATE AND ACETAMINOPHEN 1 TABLET: 10; 325 TABLET ORAL at 11:12

## 2017-12-02 RX ADMIN — ENOXAPARIN SODIUM 40 MG: 100 INJECTION SUBCUTANEOUS at 06:12

## 2017-12-02 RX ADMIN — GABAPENTIN 900 MG: 300 CAPSULE ORAL at 09:12

## 2017-12-02 RX ADMIN — HYDROCODONE BITARTRATE AND ACETAMINOPHEN 1 TABLET: 10; 325 TABLET ORAL at 07:12

## 2017-12-02 RX ADMIN — GABAPENTIN 900 MG: 300 CAPSULE ORAL at 03:12

## 2017-12-02 RX ADMIN — HYDROCODONE BITARTRATE AND ACETAMINOPHEN 1 TABLET: 10; 325 TABLET ORAL at 03:12

## 2017-12-02 RX ADMIN — VANCOMYCIN HYDROCHLORIDE 1750 MG: 10 INJECTION, POWDER, LYOPHILIZED, FOR SOLUTION INTRAVENOUS at 09:12

## 2017-12-02 RX ADMIN — STANDARDIZED SENNA CONCENTRATE AND DOCUSATE SODIUM 1 TABLET: 8.6; 5 TABLET, FILM COATED ORAL at 09:12

## 2017-12-02 RX ADMIN — VANCOMYCIN HYDROCHLORIDE 1750 MG: 10 INJECTION, POWDER, LYOPHILIZED, FOR SOLUTION INTRAVENOUS at 03:12

## 2017-12-02 RX ADMIN — HYDROCODONE BITARTRATE AND ACETAMINOPHEN 1 TABLET: 10; 325 TABLET ORAL at 02:12

## 2017-12-02 RX ADMIN — CYCLOBENZAPRINE HYDROCHLORIDE 10 MG: 5 TABLET, FILM COATED ORAL at 12:12

## 2017-12-02 RX ADMIN — IBUPROFEN 600 MG: 200 TABLET, FILM COATED ORAL at 12:12

## 2017-12-02 RX ADMIN — CEFEPIME 2 G: 2 INJECTION, POWDER, FOR SOLUTION INTRAVENOUS at 06:12

## 2017-12-02 RX ADMIN — GABAPENTIN 900 MG: 300 CAPSULE ORAL at 05:12

## 2017-12-02 RX ADMIN — CYCLOBENZAPRINE HYDROCHLORIDE 10 MG: 5 TABLET, FILM COATED ORAL at 04:12

## 2017-12-02 NOTE — PLAN OF CARE
Problem: Patient Care Overview  Goal: Plan of Care Review  Outcome: Ongoing (interventions implemented as appropriate)  POC reviewed with pt, acknowledged understanding. Pt remains free from falls/injuries, VSS. Pain managed with PRN ibuprofen,flexeril,and norco. Pt independently ambulating in hallway and frequently goes downstairs using walker in room. WCTM.

## 2017-12-02 NOTE — PROGRESS NOTES
[facility] Hospital Medicine  Progress Note    Patient Name: Evgeny Wilde  MRN: [id number]  Patient Class: IP- Inpatient   Admission Date: 11/13/2017  Length of Stay: 19 days  Attending Physician: Tanisha Neff MD  Primary Care Provider: Primary Doctor [facility] Medicine Team: [facility] MED 2 Ron Dubose MD    Subjective:     Principal Problem:Discitis of lumbar region    HPI:  32 years old male with no significant past medical Hx, present to the ED with progressive lower back pain with sciatica for the last month. He works on constructions, pain start suddenly 1 month ago, at the lower back, dull, aggravated with movements. No inciting factors. Went to [facility] and [facility] ER, he had CT and MRI and they told him he had disk problem and he need neuro follow up and provided with muscle relaxant and narcotic for pain control. Patient stated the medicine is gone and the ain is worse with numbness in his left leg. He is not working for the last month. Denied any trauma, recent infection, surgery, foreign body, recent IV drug abuse. Also denied any fever, mekhi loss, fecal or urine incontinence, saddle anaesthesia.          Hospital Course:  11/14: NSGY following             Blood cultures NGTD, Urine cultures pending.              CT lumbar spine concerning for spondylodiscitis.  11/15: afebrile overnight, blood CX NGTD, NSGY stated no indications for NSGY interventions this admission, they want follow him up as outpatient after after Abx. We will pursue IR for disk Bx/aspirate for Dx.     11/16: NAEON. Vitals stable. Pending disc aspiration via IR tomorrow.  11/17: NAEON. Vitals stable. Planned for L4-L5 biopsy today.  11/18: NAEON. Vitals stable. S/p L4-L5 disc aspiration. Cultures, pathology report pending.  11/19: NAEON. Vitals stable.S/p L4-L5 disc aspiration. Cultures, pathology report pending.  11/20: NAEON. Vitals stable. Pathology showing acute and chronic OM. Cultures remain  negative.  11/21: NAEON. Vitals stable. Patient with trouble sleeping last night with pain in the buttocks and back as well as numbness in the toes. Pt still without adequate pain control.  11/22: NAEON. VSS. Patient with increased pain issues today with pain radiating from the back down to his feet.   11/22: NAEON. VSS. Patient with better pain control today.  11/23: NAEON. VSS. Patient with better pain control today.  11/24: NAEON. VSS. Patient asking for increased frequency of pain meds today.  11/27: NAEON. VSS.   11/28: NAEON. VSS. Patient with spasms still but no other complaints.  11/29: NAEON. VSS. Patient with spasms but no other complaints.  11/30: NAEON. VSS. Patient with spasms but no other complaints.  12/1: NAEON. VSS. Patient with continued pain and spasms.    Interval History: See hospital course above.    Review of Systems   Constitutional: Negative for chills, fever and unexpected weight change.   HENT: Negative for hearing loss.    Eyes: Negative for visual disturbance.   Respiratory: Negative for shortness of breath.    Cardiovascular: Negative for chest pain.   Gastrointestinal: Negative for abdominal pain, diarrhea, nausea and vomiting.   Genitourinary: Negative for dysuria.   Musculoskeletal: Positive for back pain. Negative for arthralgias.        Back spasms   Skin: Negative for rash.   Neurological: Positive for numbness (toes). Negative for seizures.   Hematological: Negative for adenopathy. Does not bruise/bleed easily.     Objective:     Vital Signs (Most Recent):  Temp: 98.3 °F (36.8 °C) (12/02/17 0459)  Pulse: (!) 122 (12/02/17 0459)  Resp: 18 (12/02/17 0459)  BP: 127/71 (12/02/17 0459)  SpO2: 98 % (12/02/17 0459) Vital Signs (24h Range):  Temp:  [97.1 °F (36.2 °C)-98.4 °F (36.9 °C)] 98.3 °F (36.8 °C)  Pulse:  [] 122  Resp:  [16-20] 18  SpO2:  [98 %-100 %] 98 %  BP: (110-132)/(56-71) 127/71     Weight: 90.2 kg (198 lb 13.7 oz)  Body mass index is 25.53 kg/m².    Intake/Output  Summary (Last 24 hours) at 12/02/17 0741  Last data filed at 12/02/17 0124   Gross per 24 hour   Intake             1550 ml   Output             2500 ml   Net             -950 ml      Physical Exam   Constitutional: He appears well-developed and well-nourished.   HENT:   Head: Normocephalic and atraumatic.   Eyes: EOM are normal. Pupils are equal, round, and reactive to light.   Neck: No tracheal deviation present. No thyromegaly present.   Cardiovascular: Normal rate.    No murmur heard.  Pulmonary/Chest: Effort normal and breath sounds normal. He has no wheezes. He has no rales.   Abdominal: There is no tenderness. No hernia.   Musculoskeletal: He exhibits tenderness (lumbar spine and down both legs). He exhibits no edema.   Neurological: A sensory deficit (BL toes.) is present. No cranial nerve deficit. He exhibits normal muscle tone.   Skin: Skin is warm. No rash noted.   Psychiatric: He has a normal mood and affect.       Significant Labs:   CBC:     Recent Labs  Lab 12/02/17  0440   WBC 4.64   HGB 11.8*   HCT 37.2*        CMP:     Recent Labs  Lab 12/02/17  0440      K 4.1      CO2 25   GLU 95   BUN 14   CREATININE 0.9   CALCIUM 10.3   ANIONGAP 12   EGFRNONAA >60.0       Significant Imaging: I have reviewed all pertinent imaging results/findings within the past 24 hours.    Assessment/Plan:      * Discitis of lumbar region    - Stable  - NSGY and ID following, appreciate assistance  - CT lumbar spine concerning for spondylodiscitis at L4-L5, now s/p aspiration with path showing acute and chronic OM, though blood and urine cultures remain negative to date.  - ESR, CRP mildly elevated earlier this admission.   - Procal normal  - HIV Abs negative  - Pain control as described above  - PT/OT following  - Patient on ceftriaxone and vancomycin as ID's recommendation- desire 6-8 weeks of tx- end date Jan 1st at earliest   - Will plan for follow up in NSGY clinic once ABX are done with MRI w/wo  contrast & infectious labs.   - Plan to discharge to LTAC in the interim to complete IV antibiotics given substance abuse history  - Vancomycin dose looking good based on troughs        Acute bilateral low back pain with bilateral sciatica    - Stable  - Patient with discitis and acute on chronic osteomyelitis of lumbar region. Pain mostly unchanged from yesterday.  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, which complicates our discharge planning and pain control regimen  - Symptomatic treatment with new pain regiment- gabapentin 900mg TID, Ibprofen 600mg Q6, flexeril 10mg TID PRN, and PRN Norco 10mg-325mg for break-through pain.   - For spasms continue heat pack PRN and flexeril (changed to Q8H from TID per his request)  - Continue to monitor            History of substance abuse    - Admission drug screen positive for opiates (prescribed to him as an outpatient), but also benzodiazepines, THC, and cocaine  - Not a candidate for PICC placement, so pursuing LTAC placement for long-term IV antibiotics            VTE Risk Mitigation         Ordered     enoxaparin injection 40 mg  Daily     Route:  Subcutaneous        11/20/17 1617     Medium Risk of VTE  Once      11/13/17 1910              Ron Dubose MD  Department of Hospital Medicine   [facility]

## 2017-12-02 NOTE — SUBJECTIVE & OBJECTIVE
Interval History: See hospital course above.    Review of Systems   Constitutional: Negative for chills, fever and unexpected weight change.   HENT: Negative for hearing loss.    Eyes: Negative for visual disturbance.   Respiratory: Negative for shortness of breath.    Cardiovascular: Negative for chest pain.   Gastrointestinal: Negative for abdominal pain, diarrhea, nausea and vomiting.   Genitourinary: Negative for dysuria.   Musculoskeletal: Positive for back pain. Negative for arthralgias.        Back spasms   Skin: Negative for rash.   Neurological: Positive for numbness (toes). Negative for seizures.   Hematological: Negative for adenopathy. Does not bruise/bleed easily.     Objective:     Vital Signs (Most Recent):  Temp: 98.3 °F (36.8 °C) (12/02/17 0459)  Pulse: (!) 122 (12/02/17 0459)  Resp: 18 (12/02/17 0459)  BP: 127/71 (12/02/17 0459)  SpO2: 98 % (12/02/17 0459) Vital Signs (24h Range):  Temp:  [97.1 °F (36.2 °C)-98.4 °F (36.9 °C)] 98.3 °F (36.8 °C)  Pulse:  [] 122  Resp:  [16-20] 18  SpO2:  [98 %-100 %] 98 %  BP: (110-132)/(56-71) 127/71     Weight: 90.2 kg (198 lb 13.7 oz)  Body mass index is 25.53 kg/m².    Intake/Output Summary (Last 24 hours) at 12/02/17 0741  Last data filed at 12/02/17 0124   Gross per 24 hour   Intake             1550 ml   Output             2500 ml   Net             -950 ml      Physical Exam   Constitutional: He appears well-developed and well-nourished.   HENT:   Head: Normocephalic and atraumatic.   Eyes: EOM are normal. Pupils are equal, round, and reactive to light.   Neck: No tracheal deviation present. No thyromegaly present.   Cardiovascular: Normal rate.    No murmur heard.  Pulmonary/Chest: Effort normal and breath sounds normal. He has no wheezes. He has no rales.   Abdominal: There is no tenderness. No hernia.   Musculoskeletal: He exhibits tenderness (lumbar spine and down both legs). He exhibits no edema.   Neurological: A sensory deficit (BL toes.) is  present. No cranial nerve deficit. He exhibits normal muscle tone.   Skin: Skin is warm. No rash noted.   Psychiatric: He has a normal mood and affect.       Significant Labs:   CBC:     Recent Labs  Lab 12/02/17  0440   WBC 4.64   HGB 11.8*   HCT 37.2*        CMP:     Recent Labs  Lab 12/02/17  0440      K 4.1      CO2 25   GLU 95   BUN 14   CREATININE 0.9   CALCIUM 10.3   ANIONGAP 12   EGFRNONAA >60.0       Significant Imaging: I have reviewed all pertinent imaging results/findings within the past 24 hours.

## 2017-12-03 PROCEDURE — 25000003 PHARM REV CODE 250: Performed by: STUDENT IN AN ORGANIZED HEALTH CARE EDUCATION/TRAINING PROGRAM

## 2017-12-03 PROCEDURE — 20600001 HC STEP DOWN PRIVATE ROOM

## 2017-12-03 PROCEDURE — 25000003 PHARM REV CODE 250: Performed by: HOSPITALIST

## 2017-12-03 PROCEDURE — 99231 SBSQ HOSP IP/OBS SF/LOW 25: CPT | Mod: ,,, | Performed by: HOSPITALIST

## 2017-12-03 PROCEDURE — 63600175 PHARM REV CODE 636 W HCPCS: Performed by: INTERNAL MEDICINE

## 2017-12-03 PROCEDURE — 25000003 PHARM REV CODE 250: Performed by: INTERNAL MEDICINE

## 2017-12-03 RX ORDER — RAMELTEON 8 MG/1
8 TABLET ORAL NIGHTLY PRN
Status: DISCONTINUED | OUTPATIENT
Start: 2017-12-03 | End: 2017-12-19 | Stop reason: HOSPADM

## 2017-12-03 RX ADMIN — CYCLOBENZAPRINE HYDROCHLORIDE 10 MG: 5 TABLET, FILM COATED ORAL at 06:12

## 2017-12-03 RX ADMIN — STANDARDIZED SENNA CONCENTRATE AND DOCUSATE SODIUM 1 TABLET: 8.6; 5 TABLET, FILM COATED ORAL at 09:12

## 2017-12-03 RX ADMIN — CEFEPIME 2 G: 2 INJECTION, POWDER, FOR SOLUTION INTRAVENOUS at 06:12

## 2017-12-03 RX ADMIN — IBUPROFEN 600 MG: 200 TABLET, FILM COATED ORAL at 05:12

## 2017-12-03 RX ADMIN — HYDROCODONE BITARTRATE AND ACETAMINOPHEN 1 TABLET: 10; 325 TABLET ORAL at 08:12

## 2017-12-03 RX ADMIN — IBUPROFEN 600 MG: 200 TABLET, FILM COATED ORAL at 09:12

## 2017-12-03 RX ADMIN — GABAPENTIN 900 MG: 300 CAPSULE ORAL at 02:12

## 2017-12-03 RX ADMIN — VANCOMYCIN HYDROCHLORIDE 1750 MG: 10 INJECTION, POWDER, LYOPHILIZED, FOR SOLUTION INTRAVENOUS at 10:12

## 2017-12-03 RX ADMIN — HYDROCODONE BITARTRATE AND ACETAMINOPHEN 1 TABLET: 10; 325 TABLET ORAL at 04:12

## 2017-12-03 RX ADMIN — RAMELTEON 8 MG: 8 TABLET, FILM COATED ORAL at 10:12

## 2017-12-03 RX ADMIN — CYCLOBENZAPRINE HYDROCHLORIDE 10 MG: 5 TABLET, FILM COATED ORAL at 09:12

## 2017-12-03 RX ADMIN — HYDROCODONE BITARTRATE AND ACETAMINOPHEN 1 TABLET: 10; 325 TABLET ORAL at 09:12

## 2017-12-03 RX ADMIN — CYCLOBENZAPRINE HYDROCHLORIDE 10 MG: 5 TABLET, FILM COATED ORAL at 01:12

## 2017-12-03 RX ADMIN — VANCOMYCIN HYDROCHLORIDE 1750 MG: 10 INJECTION, POWDER, LYOPHILIZED, FOR SOLUTION INTRAVENOUS at 02:12

## 2017-12-03 RX ADMIN — MENTHOL, METHYL SALICYLATE: 10; 15 CREAM TOPICAL at 02:12

## 2017-12-03 RX ADMIN — HYDROCODONE BITARTRATE AND ACETAMINOPHEN 1 TABLET: 10; 325 TABLET ORAL at 02:12

## 2017-12-03 RX ADMIN — VANCOMYCIN HYDROCHLORIDE 1750 MG: 10 INJECTION, POWDER, LYOPHILIZED, FOR SOLUTION INTRAVENOUS at 04:12

## 2017-12-03 RX ADMIN — GABAPENTIN 900 MG: 300 CAPSULE ORAL at 04:12

## 2017-12-03 RX ADMIN — ENOXAPARIN SODIUM 40 MG: 100 INJECTION SUBCUTANEOUS at 06:12

## 2017-12-03 RX ADMIN — GABAPENTIN 900 MG: 300 CAPSULE ORAL at 10:12

## 2017-12-03 NOTE — PLAN OF CARE
Problem: Patient Care Overview  Goal: Plan of Care Review  Outcome: Ongoing (interventions implemented as appropriate)  Pt ambulating in hallway with brace and FWW, Ivantibiotics per MAR, pain moderately controlled with PRN pain medications, pt denies nausea, reports BM this shift, voiding without difficulty.  Safety measures maintained, no further needs assessed at this time.

## 2017-12-03 NOTE — PROGRESS NOTES
[facility] Hospital Medicine  Progress Note    Patient Name: Evgeny Wilde  MRN: [id number]  Patient Class: IP- Inpatient   Admission Date: 11/13/2017  Length of Stay: 20 days  Attending Physician: Tanisha Neff MD  Primary Care Provider: [facility] Doctor [facility] Medicine Team: [facility] 2 Ron Dubose MD    Subjective:     Principal Problem:Discitis of lumbar region    HPI:  32 years old male with no significant past medical Hx, present to the ED with progressive lower back pain with sciatica for the last month. He works on constructions, pain start suddenly 1 month ago, at the lower back, dull, aggravated with movements. No inciting factors. Went to [facility], he had CT and MRI and they told him he had disk problem and he need neuro follow up and provided with muscle relaxant and narcotic for pain control. Patient stated the medicine is gone and the ain is worse with numbness in his left leg. He is not working for the last month. Denied any trauma, recent infection, surgery, foreign body, recent IV drug abuse. Also denied any fever, mekhi loss, fecal or urine incontinence, saddle anaesthesia.          Hospital Course:  11/14: NSGY following             Blood cultures NGTD, Urine cultures pending.              CT lumbar spine concerning for spondylodiscitis.  11/15: afebrile overnight, blood CX NGTD, NSGY stated no indications for NSGY interventions this admission, they want follow him up as outpatient after after Abx. We will pursue IR for disk Bx/aspirate for Dx.     11/16: NAEON. Vitals stable. Pending disc aspiration via IR tomorrow.  11/17: NAEON. Vitals stable. Planned for L4-L5 biopsy today.  11/18: NAEON. Vitals stable. S/p L4-L5 disc aspiration. Cultures, pathology report pending.  11/19: NAEON. Vitals stable.S/p L4-L5 disc aspiration. Cultures, pathology report pending.  11/20: NAEON. Vitals stable. Pathology showing acute and chronic OM. Cultures remain  negative.  11/21: NAEON. Vitals stable. Patient with trouble sleeping last night with pain in the buttocks and back as well as numbness in the toes. Pt still without adequate pain control.  11/22: NAEON. VSS. Patient with increased pain issues today with pain radiating from the back down to his feet.   11/22: NAEON. VSS. Patient with better pain control today.  11/23: NAEON. VSS. Patient with better pain control today.  11/24: NAEON. VSS. Patient asking for increased frequency of pain meds today.  11/27: NAEON. VSS.   11/28: NAEON. VSS. Patient with spasms still but no other complaints.  11/29: NAEON. VSS. Patient with spasms but no other complaints.  11/30: NAEON. VSS. Patient with spasms but no other complaints.  12/2: NAEON. VSS. Patient with continued pain and spasms.  12/3: NAEON. VSS. Patient with continued pain and spasms.    Interval History: See hospital course above.    Review of Systems   Constitutional: Negative for chills, fever and unexpected weight change.   HENT: Negative for hearing loss.    Eyes: Negative for visual disturbance.   Respiratory: Negative for shortness of breath.    Cardiovascular: Negative for chest pain.   Gastrointestinal: Negative for abdominal pain, diarrhea, nausea and vomiting.   Genitourinary: Negative for dysuria.   Musculoskeletal: Positive for back pain. Negative for arthralgias.        Back spasms   Skin: Negative for rash.   Neurological: Positive for numbness (toes). Negative for seizures.   Hematological: Negative for adenopathy. Does not bruise/bleed easily.     Objective:     Vital Signs (Most Recent):  Temp: 97.9 °F (36.6 °C) (12/03/17 0801)  Pulse: 107 (12/03/17 0801)  Resp: 16 (12/03/17 0801)  BP: 109/74 (12/03/17 0801)  SpO2: 98 % (12/03/17 0801) Vital Signs (24h Range):  Temp:  [97.6 °F (36.4 °C)-98.2 °F (36.8 °C)] 97.9 °F (36.6 °C)  Pulse:  [] 107  Resp:  [16-18] 16  SpO2:  [97 %-99 %] 98 %  BP: (109-130)/(61-80) 109/74     Weight: 90.2 kg (198 lb 13.7  oz)  Body mass index is 25.53 kg/m².    Intake/Output Summary (Last 24 hours) at 12/03/17 0831  Last data filed at 12/03/17 0400   Gross per 24 hour   Intake             1740 ml   Output             1000 ml   Net              740 ml      Physical Exam   Constitutional: He appears well-developed and well-nourished.   HENT:   Head: Normocephalic and atraumatic.   Eyes: EOM are normal. Pupils are equal, round, and reactive to light.   Neck: No tracheal deviation present. No thyromegaly present.   Cardiovascular: Normal rate.    No murmur heard.  Pulmonary/Chest: Effort normal and breath sounds normal. He has no wheezes. He has no rales.   Abdominal: There is no tenderness. No hernia.   Musculoskeletal: He exhibits tenderness (lumbar spine and down both legs). He exhibits no edema.   Neurological: A sensory deficit (BL toes.) is present. No cranial nerve deficit. He exhibits normal muscle tone.   Skin: Skin is warm. No rash noted.   Psychiatric: He has a normal mood and affect.       Significant Labs:   CBC:     Recent Labs  Lab 12/02/17  0440   WBC 4.64   HGB 11.8*   HCT 37.2*        CMP:     Recent Labs  Lab 12/02/17  0440      K 4.1      CO2 25   GLU 95   BUN 14   CREATININE 0.9   CALCIUM 10.3   ANIONGAP 12   EGFRNONAA >60.0       Significant Imaging: I have reviewed all pertinent imaging results/findings within the past 24 hours.    Assessment/Plan:      * Discitis of lumbar region    - Stable  - NSGY and ID following, appreciate assistance  - CT lumbar spine concerning for spondylodiscitis at L4-L5, now s/p aspiration with path showing acute and chronic OM, though blood and urine cultures remain negative to date.  - ESR, CRP mildly elevated earlier this admission.   - Procal normal  - HIV Abs negative  - Pain control as described above  - PT/OT following  - Patient on ceftriaxone and vancomycin as ID's recommendation- desire 6-8 weeks of tx- end date Jan 1st at earliest   - Will plan for follow  up in NSGY clinic once ABX are done with MRI w/wo contrast & infectious labs.   - Plan to discharge to LTAC in the interim to complete IV antibiotics given substance abuse history  - Vancomycin dose looking good based on troughs        Acute bilateral low back pain with bilateral sciatica    - Stable  - Patient with discitis and acute on chronic osteomyelitis of lumbar region. Pain mostly unchanged from yesterday.  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, which complicates our discharge planning and pain control regimen  - Symptomatic treatment with new pain regiment- gabapentin 900mg TID, Ibprofen 600mg Q6, flexeril 10mg TID PRN, and PRN Norco 10mg-325mg for break-through pain.   - For spasms continue heat pack PRN and flexeril (changed to Q8H from TID per his request)  - Continue to monitor            History of substance abuse    - Admission drug screen positive for opiates (prescribed to him as an outpatient), but also benzodiazepines, THC, and cocaine  - Not a candidate for PICC placement, so pursuing LTAC placement for long-term IV antibiotics            VTE Risk Mitigation         Ordered     enoxaparin injection 40 mg  Daily     Route:  Subcutaneous        11/20/17 1617     Medium Risk of VTE  Once      11/13/17 1910              Ron Dubose MD  Department of Hospital Medicine   [facility]

## 2017-12-03 NOTE — SUBJECTIVE & OBJECTIVE
Interval History: See hospital course above.    Review of Systems   Constitutional: Negative for chills, fever and unexpected weight change.   HENT: Negative for hearing loss.    Eyes: Negative for visual disturbance.   Respiratory: Negative for shortness of breath.    Cardiovascular: Negative for chest pain.   Gastrointestinal: Negative for abdominal pain, diarrhea, nausea and vomiting.   Genitourinary: Negative for dysuria.   Musculoskeletal: Positive for back pain. Negative for arthralgias.        Back spasms   Skin: Negative for rash.   Neurological: Positive for numbness (toes). Negative for seizures.   Hematological: Negative for adenopathy. Does not bruise/bleed easily.     Objective:     Vital Signs (Most Recent):  Temp: 97.9 °F (36.6 °C) (12/03/17 0801)  Pulse: 107 (12/03/17 0801)  Resp: 16 (12/03/17 0801)  BP: 109/74 (12/03/17 0801)  SpO2: 98 % (12/03/17 0801) Vital Signs (24h Range):  Temp:  [97.6 °F (36.4 °C)-98.2 °F (36.8 °C)] 97.9 °F (36.6 °C)  Pulse:  [] 107  Resp:  [16-18] 16  SpO2:  [97 %-99 %] 98 %  BP: (109-130)/(61-80) 109/74     Weight: 90.2 kg (198 lb 13.7 oz)  Body mass index is 25.53 kg/m².    Intake/Output Summary (Last 24 hours) at 12/03/17 0831  Last data filed at 12/03/17 0400   Gross per 24 hour   Intake             1740 ml   Output             1000 ml   Net              740 ml      Physical Exam   Constitutional: He appears well-developed and well-nourished.   HENT:   Head: Normocephalic and atraumatic.   Eyes: EOM are normal. Pupils are equal, round, and reactive to light.   Neck: No tracheal deviation present. No thyromegaly present.   Cardiovascular: Normal rate.    No murmur heard.  Pulmonary/Chest: Effort normal and breath sounds normal. He has no wheezes. He has no rales.   Abdominal: There is no tenderness. No hernia.   Musculoskeletal: He exhibits tenderness (lumbar spine and down both legs). He exhibits no edema.   Neurological: A sensory deficit (BL toes.) is present. No  cranial nerve deficit. He exhibits normal muscle tone.   Skin: Skin is warm. No rash noted.   Psychiatric: He has a normal mood and affect.       Significant Labs:   CBC:     Recent Labs  Lab 12/02/17  0440   WBC 4.64   HGB 11.8*   HCT 37.2*        CMP:     Recent Labs  Lab 12/02/17  0440      K 4.1      CO2 25   GLU 95   BUN 14   CREATININE 0.9   CALCIUM 10.3   ANIONGAP 12   EGFRNONAA >60.0       Significant Imaging: I have reviewed all pertinent imaging results/findings within the past 24 hours.

## 2017-12-03 NOTE — PLAN OF CARE
Problem: Patient Care Overview  Goal: Plan of Care Review  Outcome: Ongoing (interventions implemented as appropriate)  Pt up ad nae in room, using FWW and brace when out of bed, reports continued pain, mild relief with PRN medications.  IV antibiotics continued, tolerating regular diet.  Safety measures maintained, POC reviewed, smoking cessation education completed.  No further needs assessed.

## 2017-12-03 NOTE — PLAN OF CARE
Problem: Patient Care Overview  Goal: Plan of Care Review  Outcome: Ongoing (interventions implemented as appropriate)  AAOX4.  VSS.  Pt complaining of pain, prn pain meds being administered.  IV antibiotics administered.  Bed is in lowest position, call bell is in reach, and pt instructed to call nurse when needing assistance.  Will continue to monitor.

## 2017-12-04 LAB
ANION GAP SERPL CALC-SCNC: 10 MMOL/L
BASOPHILS # BLD AUTO: 0.03 K/UL
BASOPHILS NFR BLD: 0.6 %
BUN SERPL-MCNC: 13 MG/DL
CALCIUM SERPL-MCNC: 9.9 MG/DL
CHLORIDE SERPL-SCNC: 102 MMOL/L
CO2 SERPL-SCNC: 25 MMOL/L
CREAT SERPL-MCNC: 0.8 MG/DL
DIFFERENTIAL METHOD: ABNORMAL
EOSINOPHIL # BLD AUTO: 0.1 K/UL
EOSINOPHIL NFR BLD: 2.3 %
ERYTHROCYTE [DISTWIDTH] IN BLOOD BY AUTOMATED COUNT: 14.8 %
EST. GFR  (AFRICAN AMERICAN): >60 ML/MIN/1.73 M^2
EST. GFR  (NON AFRICAN AMERICAN): >60 ML/MIN/1.73 M^2
GLUCOSE SERPL-MCNC: 95 MG/DL
HCT VFR BLD AUTO: 35.2 %
HGB BLD-MCNC: 10.7 G/DL
IMM GRANULOCYTES # BLD AUTO: 0.02 K/UL
IMM GRANULOCYTES NFR BLD AUTO: 0.4 %
LYMPHOCYTES # BLD AUTO: 2.1 K/UL
LYMPHOCYTES NFR BLD: 38.9 %
MCH RBC QN AUTO: 26.5 PG
MCHC RBC AUTO-ENTMCNC: 30.4 G/DL
MCV RBC AUTO: 87 FL
MONOCYTES # BLD AUTO: 1.6 K/UL
MONOCYTES NFR BLD: 29.1 %
NEUTROPHILS # BLD AUTO: 1.5 K/UL
NEUTROPHILS NFR BLD: 28.7 %
NRBC BLD-RTO: 0 /100 WBC
PLATELET # BLD AUTO: 262 K/UL
PMV BLD AUTO: 9.8 FL
POTASSIUM SERPL-SCNC: 4.5 MMOL/L
RBC # BLD AUTO: 4.04 M/UL
SODIUM SERPL-SCNC: 137 MMOL/L
WBC # BLD AUTO: 5.32 K/UL

## 2017-12-04 PROCEDURE — 25000003 PHARM REV CODE 250: Performed by: STUDENT IN AN ORGANIZED HEALTH CARE EDUCATION/TRAINING PROGRAM

## 2017-12-04 PROCEDURE — 80048 BASIC METABOLIC PNL TOTAL CA: CPT

## 2017-12-04 PROCEDURE — 20600001 HC STEP DOWN PRIVATE ROOM

## 2017-12-04 PROCEDURE — 36415 COLL VENOUS BLD VENIPUNCTURE: CPT

## 2017-12-04 PROCEDURE — 63600175 PHARM REV CODE 636 W HCPCS: Performed by: INTERNAL MEDICINE

## 2017-12-04 PROCEDURE — 25000003 PHARM REV CODE 250: Performed by: INTERNAL MEDICINE

## 2017-12-04 PROCEDURE — 25000003 PHARM REV CODE 250: Performed by: HOSPITALIST

## 2017-12-04 PROCEDURE — 85025 COMPLETE CBC W/AUTO DIFF WBC: CPT

## 2017-12-04 PROCEDURE — 25000003 PHARM REV CODE 250: Performed by: SURGERY

## 2017-12-04 RX ORDER — BACLOFEN 10 MG/1
10 TABLET ORAL 3 TIMES DAILY
Status: DISCONTINUED | OUTPATIENT
Start: 2017-12-04 | End: 2017-12-05

## 2017-12-04 RX ORDER — LIDOCAINE 50 MG/G
1 PATCH TOPICAL
Status: DISCONTINUED | OUTPATIENT
Start: 2017-12-04 | End: 2017-12-19 | Stop reason: HOSPADM

## 2017-12-04 RX ORDER — OXYCODONE HYDROCHLORIDE 5 MG/1
5 TABLET ORAL ONCE
Status: COMPLETED | OUTPATIENT
Start: 2017-12-04 | End: 2017-12-04

## 2017-12-04 RX ADMIN — OXYCODONE HYDROCHLORIDE 5 MG: 5 TABLET ORAL at 02:12

## 2017-12-04 RX ADMIN — RAMELTEON 8 MG: 8 TABLET, FILM COATED ORAL at 11:12

## 2017-12-04 RX ADMIN — VANCOMYCIN HYDROCHLORIDE 1750 MG: 10 INJECTION, POWDER, LYOPHILIZED, FOR SOLUTION INTRAVENOUS at 05:12

## 2017-12-04 RX ADMIN — VANCOMYCIN HYDROCHLORIDE 1750 MG: 10 INJECTION, POWDER, LYOPHILIZED, FOR SOLUTION INTRAVENOUS at 09:12

## 2017-12-04 RX ADMIN — IBUPROFEN 600 MG: 200 TABLET, FILM COATED ORAL at 07:12

## 2017-12-04 RX ADMIN — IBUPROFEN 600 MG: 200 TABLET, FILM COATED ORAL at 11:12

## 2017-12-04 RX ADMIN — ENOXAPARIN SODIUM 40 MG: 100 INJECTION SUBCUTANEOUS at 05:12

## 2017-12-04 RX ADMIN — LIDOCAINE 1 PATCH: 50 PATCH CUTANEOUS at 02:12

## 2017-12-04 RX ADMIN — HYDROCODONE BITARTRATE AND ACETAMINOPHEN 1 TABLET: 10; 325 TABLET ORAL at 05:12

## 2017-12-04 RX ADMIN — STANDARDIZED SENNA CONCENTRATE AND DOCUSATE SODIUM 1 TABLET: 8.6; 5 TABLET, FILM COATED ORAL at 09:12

## 2017-12-04 RX ADMIN — GABAPENTIN 900 MG: 300 CAPSULE ORAL at 01:12

## 2017-12-04 RX ADMIN — GABAPENTIN 900 MG: 300 CAPSULE ORAL at 08:12

## 2017-12-04 RX ADMIN — VANCOMYCIN HYDROCHLORIDE 1750 MG: 10 INJECTION, POWDER, LYOPHILIZED, FOR SOLUTION INTRAVENOUS at 01:12

## 2017-12-04 RX ADMIN — STANDARDIZED SENNA CONCENTRATE AND DOCUSATE SODIUM 1 TABLET: 8.6; 5 TABLET, FILM COATED ORAL at 08:12

## 2017-12-04 RX ADMIN — HYDROCODONE BITARTRATE AND ACETAMINOPHEN 1 TABLET: 10; 325 TABLET ORAL at 01:12

## 2017-12-04 RX ADMIN — CYCLOBENZAPRINE HYDROCHLORIDE 10 MG: 5 TABLET, FILM COATED ORAL at 02:12

## 2017-12-04 RX ADMIN — IBUPROFEN 600 MG: 200 TABLET, FILM COATED ORAL at 12:12

## 2017-12-04 RX ADMIN — BACLOFEN 10 MG: 10 TABLET ORAL at 09:12

## 2017-12-04 RX ADMIN — HYDROCODONE BITARTRATE AND ACETAMINOPHEN 1 TABLET: 10; 325 TABLET ORAL at 09:12

## 2017-12-04 RX ADMIN — CYCLOBENZAPRINE HYDROCHLORIDE 10 MG: 5 TABLET, FILM COATED ORAL at 11:12

## 2017-12-04 RX ADMIN — HYDROCODONE BITARTRATE AND ACETAMINOPHEN 1 TABLET: 10; 325 TABLET ORAL at 08:12

## 2017-12-04 RX ADMIN — CEFEPIME 2 G: 2 INJECTION, POWDER, FOR SOLUTION INTRAVENOUS at 05:12

## 2017-12-04 RX ADMIN — HYDROCODONE BITARTRATE AND ACETAMINOPHEN 1 TABLET: 10; 325 TABLET ORAL at 12:12

## 2017-12-04 RX ADMIN — GABAPENTIN 900 MG: 300 CAPSULE ORAL at 05:12

## 2017-12-04 NOTE — SUBJECTIVE & OBJECTIVE
Interval History: Patient with lumbar back pain that was different than normal back pain.  Given 5 mg of oxycodone with some pain relief.  Remains afebrile with normal vitals.      Review of Systems   Constitutional: Negative for chills, fever and unexpected weight change.   HENT: Negative for hearing loss.    Eyes: Negative for visual disturbance.   Respiratory: Negative for shortness of breath.    Cardiovascular: Negative for chest pain.   Gastrointestinal: Negative for abdominal pain, diarrhea, nausea and vomiting.   Genitourinary: Negative for dysuria.   Musculoskeletal: Positive for back pain. Negative for arthralgias.        Back spasms   Skin: Negative for rash.   Neurological: Positive for numbness (toes). Negative for seizures.     Objective:     Vital Signs (Most Recent):  Temp: 97.5 °F (36.4 °C) (12/04/17 0749)  Pulse: (!) 111 (12/04/17 0749)  Resp: 16 (12/04/17 0749)  BP: 117/80 (12/04/17 0749)  SpO2: (!) 93 % (12/04/17 0749) Vital Signs (24h Range):  Temp:  [97.5 °F (36.4 °C)-98.4 °F (36.9 °C)] 97.5 °F (36.4 °C)  Pulse:  [] 111  Resp:  [16-18] 16  SpO2:  [93 %-100 %] 93 %  BP: (114-145)/(65-86) 117/80     Weight: 90.2 kg (198 lb 13.7 oz)  Body mass index is 25.53 kg/m².    Intake/Output Summary (Last 24 hours) at 12/04/17 0913  Last data filed at 12/04/17 0100   Gross per 24 hour   Intake              200 ml   Output             1000 ml   Net             -800 ml      Physical Exam   Constitutional: He is oriented to person, place, and time. He appears well-developed and well-nourished.   HENT:   Head: Normocephalic and atraumatic.   Eyes: EOM are normal. Pupils are equal, round, and reactive to light.   Neck: No tracheal deviation present. No thyromegaly present.   Cardiovascular: Normal rate.  Exam reveals no gallop and no friction rub.    No murmur heard.  Pulmonary/Chest: Effort normal and breath sounds normal. He has no wheezes. He has no rales.   Abdominal: There is no tenderness. No hernia.    Musculoskeletal: He exhibits tenderness (lumbar spine ). He exhibits no edema.   Neurological: He is alert and oriented to person, place, and time. No cranial nerve deficit or sensory deficit. He exhibits normal muscle tone.   Skin: Skin is warm. No rash noted.   Psychiatric: He has a normal mood and affect.       Significant Labs:   Recent Results (from the past 24 hour(s))   Basic Metabolic Panel (BMP)    Collection Time: 12/04/17  3:45 AM   Result Value Ref Range    Sodium 137 136 - 145 mmol/L    Potassium 4.5 3.5 - 5.1 mmol/L    Chloride 102 95 - 110 mmol/L    CO2 25 23 - 29 mmol/L    Glucose 95 70 - 110 mg/dL    BUN, Bld 13 6 - 20 mg/dL    Creatinine 0.8 0.5 - 1.4 mg/dL    Calcium 9.9 8.7 - 10.5 mg/dL    Anion Gap 10 8 - 16 mmol/L    eGFR if African American >60.0 >60 mL/min/1.73 m^2    eGFR if non African American >60.0 >60 mL/min/1.73 m^2   CBC auto differential    Collection Time: 12/04/17  3:45 AM   Result Value Ref Range    WBC 5.32 3.90 - 12.70 K/uL    RBC 4.04 (L) 4.60 - 6.20 M/uL    Hemoglobin 10.7 (L) 14.0 - 18.0 g/dL    Hematocrit 35.2 (L) 40.0 - 54.0 %    MCV 87 82 - 98 fL    MCH 26.5 (L) 27.0 - 31.0 pg    MCHC 30.4 (L) 32.0 - 36.0 g/dL    RDW 14.8 (H) 11.5 - 14.5 %    Platelets 262 150 - 350 K/uL    MPV 9.8 9.2 - 12.9 fL    Immature Granulocytes 0.4 0.0 - 0.5 %    Gran # 1.5 (L) 1.8 - 7.7 K/uL    Immature Grans (Abs) 0.02 0.00 - 0.04 K/uL    Lymph # 2.1 1.0 - 4.8 K/uL    Mono # 1.6 (H) 0.3 - 1.0 K/uL    Eos # 0.1 0.0 - 0.5 K/uL    Baso # 0.03 0.00 - 0.20 K/uL    nRBC 0 0 /100 WBC    Gran% 28.7 (L) 38.0 - 73.0 %    Lymph% 38.9 18.0 - 48.0 %    Mono% 29.1 (H) 4.0 - 15.0 %    Eosinophil% 2.3 0.0 - 8.0 %    Basophil% 0.6 0.0 - 1.9 %    Differential Method Automated          Significant Imaging: No new imaging

## 2017-12-04 NOTE — ASSESSMENT & PLAN NOTE
- Stable  - Patient with discitis and acute on chronic osteomyelitis of lumbar region. Pain mostly unchanged from yesterday.  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, which complicates our discharge planning and pain control regimen  - Symptomatic treatment with current pain regiment- gabapentin 900mg TID, Ibprofen 600mg Q6, flexeril 10mg TID PRN, and PRN Norco 10mg-325mg for break-through pain; will consider starting oxycodone and lidocaine patch, discontinue Norco  - Continue to monitor

## 2017-12-04 NOTE — ASSESSMENT & PLAN NOTE
- Stable  - NSGY and ID following, appreciate assistance  - CT lumbar spine concerning for spondylodiscitis at L4-L5, now s/p aspiration with path showing acute and chronic OM, though blood and urine cultures remain negative to date.  - ESR, CRP mildly elevated earlier this admission.   - Procal normal  - HIV Abs negative  - PT/OT following  - Pain control  - Patient on ceftriaxone and vancomycin as ID's recommendation- desire 6-8 weeks of tx- end date Jan 1st at earliest   - Will plan for follow up in NSGY clinic once ABX are done with MRI w/wo contrast & infectious labs.   - Plan to discharge to LTAC in the interim to complete IV antibiotics given substance abuse history  - Vancomycin dose looking good based on troughs

## 2017-12-04 NOTE — PLAN OF CARE
Problem: Patient Care Overview  Goal: Plan of Care Review  Outcome: Ongoing (interventions implemented as appropriate)  Pt resting in bed, pain minimally controled with PRN medications, Mds aware, new zeynep baclofen to start tonight, pt updated on new regimen, verbalized understanding.  Pt up ad-nea in room and off floor with FWW and brace, Ivantibiotics maintained, Iv site changed.  VSS, safety measures maintained, call light within reach.  No further needs assessed at this time.

## 2017-12-04 NOTE — PLAN OF CARE
Sw did leave message with [facility] to see if Pt was accepted, Sw to follow. Also left message with [facility] as per insurance for Pt will auth LTAC per CM now that SNF level of care has denied Pt. Sw to follow.

## 2017-12-04 NOTE — PROGRESS NOTES
[facility] Hospital Medicine  Progress Note    Patient Name: Evgeny Wilde  MRN: [id number]  Patient Class: IP- Inpatient   Admission Date: 11/13/2017  Length of Stay: 21 days  Attending Physician: Tanisha Neff MD  Primary Care Provider: Primary Doctor [facility] Medicine Team: [facility] MED 2 Mateo Carlson MD    Subjective:     Principal Problem:Discitis of lumbar region    HPI:  32 years old male with no significant past medical Hx, present to the ED with progressive lower back pain with sciatica for the last month. He works on constructions, pain start suddenly 1 month ago, at the lower back, dull, aggravated with movements. No inciting factors. Went to [facility] and [facility] ER, he had CT and MRI and they told him he had disk problem and he need neuro follow up and provided with muscle relaxant and narcotic for pain control. Patient stated the medicine is gone and the ain is worse with numbness in his left leg. He is not working for the last month. Denied any trauma, recent infection, surgery, foreign body, recent IV drug abuse. Also denied any fever, mekhi loss, fecal or urine incontinence, saddle anaesthesia.          Hospital Course:  11/14: NSGY following             Blood cultures NGTD, Urine cultures pending.              CT lumbar spine concerning for spondylodiscitis.  11/15: afebrile overnight, blood CX NGTD, NSGY stated no indications for NSGY interventions this admission, they want follow him up as outpatient after after Abx. We will pursue IR for disk Bx/aspirate for Dx.     11/16: NAEON. Vitals stable. Pending disc aspiration via IR tomorrow.  11/17: NAEON. Vitals stable. Planned for L4-L5 biopsy today.  11/18: NAEON. Vitals stable. S/p L4-L5 disc aspiration. Cultures, pathology report pending.  11/19: NAEON. Vitals stable.S/p L4-L5 disc aspiration. Cultures, pathology report pending.  11/20: NAEON. Vitals stable. Pathology showing acute and chronic OM. Cultures remain  negative.  11/21: NAEON. Vitals stable. Patient with trouble sleeping last night with pain in the buttocks and back as well as numbness in the toes. Pt still without adequate pain control.  11/22: NAEON. VSS. Patient with increased pain issues today with pain radiating from the back down to his feet.   11/22: NAEON. VSS. Patient with better pain control today.  11/23: NAEON. VSS. Patient with better pain control today.  11/24: NAEON. VSS. Patient asking for increased frequency of pain meds today.  11/27: NAEON. VSS.   11/28: NAEON. VSS. Patient with spasms still but no other complaints.  11/29: NAEON. VSS. Patient with spasms but no other complaints.  11/30: NAEON. VSS. Patient with spasms but no other complaints.  12/2: NAEON. VSS. Patient with continued pain and spasms.  12/3: NAEON. VSS. Patient with continued pain and spasms.      Interval History: Patient with lumbar back pain that was different than normal back pain.  Given 5 mg of oxycodone with some pain relief.  Remains afebrile with normal vitals.      Review of Systems   Constitutional: Negative for chills, fever and unexpected weight change.   HENT: Negative for hearing loss.    Eyes: Negative for visual disturbance.   Respiratory: Negative for shortness of breath.    Cardiovascular: Negative for chest pain.   Gastrointestinal: Negative for abdominal pain, diarrhea, nausea and vomiting.   Genitourinary: Negative for dysuria.   Musculoskeletal: Positive for back pain. Negative for arthralgias.        Back spasms   Skin: Negative for rash.   Neurological: Positive for numbness (toes). Negative for seizures.     Objective:     Vital Signs (Most Recent):  Temp: 97.5 °F (36.4 °C) (12/04/17 0749)  Pulse: (!) 111 (12/04/17 0749)  Resp: 16 (12/04/17 0749)  BP: 117/80 (12/04/17 0749)  SpO2: (!) 93 % (12/04/17 0749) Vital Signs (24h Range):  Temp:  [97.5 °F (36.4 °C)-98.4 °F (36.9 °C)] 97.5 °F (36.4 °C)  Pulse:  [] 111  Resp:  [16-18] 16  SpO2:  [93 %-100 %]  93 %  BP: (114-145)/(65-86) 117/80     Weight: 90.2 kg (198 lb 13.7 oz)  Body mass index is 25.53 kg/m².    Intake/Output Summary (Last 24 hours) at 12/04/17 0913  Last data filed at 12/04/17 0100   Gross per 24 hour   Intake              200 ml   Output             1000 ml   Net             -800 ml      Physical Exam   Constitutional: He is oriented to person, place, and time. He appears well-developed and well-nourished.   HENT:   Head: Normocephalic and atraumatic.   Eyes: EOM are normal. Pupils are equal, round, and reactive to light.   Neck: No tracheal deviation present. No thyromegaly present.   Cardiovascular: Normal rate.  Exam reveals no gallop and no friction rub.    No murmur heard.  Pulmonary/Chest: Effort normal and breath sounds normal. He has no wheezes. He has no rales.   Abdominal: There is no tenderness. No hernia.   Musculoskeletal: He exhibits tenderness (lumbar spine ). He exhibits no edema.   Neurological: He is alert and oriented to person, place, and time. No cranial nerve deficit or sensory deficit. He exhibits normal muscle tone.   Skin: Skin is warm. No rash noted.   Psychiatric: He has a normal mood and affect.       Significant Labs:   Recent Results (from the past 24 hour(s))   Basic Metabolic Panel (BMP)    Collection Time: 12/04/17  3:45 AM   Result Value Ref Range    Sodium 137 136 - 145 mmol/L    Potassium 4.5 3.5 - 5.1 mmol/L    Chloride 102 95 - 110 mmol/L    CO2 25 23 - 29 mmol/L    Glucose 95 70 - 110 mg/dL    BUN, Bld 13 6 - 20 mg/dL    Creatinine 0.8 0.5 - 1.4 mg/dL    Calcium 9.9 8.7 - 10.5 mg/dL    Anion Gap 10 8 - 16 mmol/L    eGFR if African American >60.0 >60 mL/min/1.73 m^2    eGFR if non African American >60.0 >60 mL/min/1.73 m^2   CBC auto differential    Collection Time: 12/04/17  3:45 AM   Result Value Ref Range    WBC 5.32 3.90 - 12.70 K/uL    RBC 4.04 (L) 4.60 - 6.20 M/uL    Hemoglobin 10.7 (L) 14.0 - 18.0 g/dL    Hematocrit 35.2 (L) 40.0 - 54.0 %    MCV 87 82 -  98 fL    MCH 26.5 (L) 27.0 - 31.0 pg    MCHC 30.4 (L) 32.0 - 36.0 g/dL    RDW 14.8 (H) 11.5 - 14.5 %    Platelets 262 150 - 350 K/uL    MPV 9.8 9.2 - 12.9 fL    Immature Granulocytes 0.4 0.0 - 0.5 %    Gran # 1.5 (L) 1.8 - 7.7 K/uL    Immature Grans (Abs) 0.02 0.00 - 0.04 K/uL    Lymph # 2.1 1.0 - 4.8 K/uL    Mono # 1.6 (H) 0.3 - 1.0 K/uL    Eos # 0.1 0.0 - 0.5 K/uL    Baso # 0.03 0.00 - 0.20 K/uL    nRBC 0 0 /100 WBC    Gran% 28.7 (L) 38.0 - 73.0 %    Lymph% 38.9 18.0 - 48.0 %    Mono% 29.1 (H) 4.0 - 15.0 %    Eosinophil% 2.3 0.0 - 8.0 %    Basophil% 0.6 0.0 - 1.9 %    Differential Method Automated          Significant Imaging: No new imaging    Assessment/Plan:      * Discitis of lumbar region    - Stable  - NSGY and ID following, appreciate assistance  - CT lumbar spine concerning for spondylodiscitis at L4-L5, now s/p aspiration with path showing acute and chronic OM, though blood and urine cultures remain negative to date.  - ESR, CRP mildly elevated earlier this admission.   - Procal normal  - HIV Abs negative  - PT/OT following  - Pain control  - Patient on ceftriaxone and vancomycin as ID's recommendation- desire 6-8 weeks of tx- end date Jan 1st at earliest   - Will plan for follow up in NSGY clinic once ABX are done with MRI w/wo contrast & infectious labs.   - Plan to discharge to LTAC in the interim to complete IV antibiotics given substance abuse history  - Vancomycin dose looking good based on troughs        History of substance abuse    - Admission drug screen positive for opiates (prescribed to him as an outpatient), but also benzodiazepines, THC, and cocaine  - Not a candidate for PICC placement, so pursuing LTAC placement for long-term IV antibiotics          Acute bilateral low back pain with bilateral sciatica    - Stable  - Patient with discitis and acute on chronic osteomyelitis of lumbar region. Pain mostly unchanged from yesterday.  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, which complicates our  discharge planning and pain control regimen  - Symptomatic treatment with current pain regiment- gabapentin 900mg TID, Ibprofen 600mg Q6, flexeril 10mg TID PRN, and PRN Norco 10mg-325mg for break-through pain; will consider starting oxycodone and lidocaine patch, discontinue Norco  - Continue to monitor              VTE Risk Mitigation         Ordered     enoxaparin injection 40 mg  Daily     Route:  Subcutaneous        11/20/17 1617     Medium Risk of VTE  Once      11/13/17 1910              Mateo Carlson MD  Department of Hospital Medicine   [facility]

## 2017-12-04 NOTE — PLAN OF CARE
Problem: Patient Care Overview  Goal: Plan of Care Review  Pt remains AAOx4, vital signs have been stable. Pt using FWW and brace when out of bed, reports continued pain, mild relief with PRN medications; team notified. On call was at bedside. Antibiotics given as ordered. Call light and personal belongings within reach. Will continue to monitor.

## 2017-12-05 PROCEDURE — 25000003 PHARM REV CODE 250: Performed by: SURGERY

## 2017-12-05 PROCEDURE — 25000003 PHARM REV CODE 250: Performed by: INTERNAL MEDICINE

## 2017-12-05 PROCEDURE — 25000003 PHARM REV CODE 250: Performed by: STUDENT IN AN ORGANIZED HEALTH CARE EDUCATION/TRAINING PROGRAM

## 2017-12-05 PROCEDURE — 20600001 HC STEP DOWN PRIVATE ROOM

## 2017-12-05 PROCEDURE — 63600175 PHARM REV CODE 636 W HCPCS: Performed by: INTERNAL MEDICINE

## 2017-12-05 PROCEDURE — 99231 SBSQ HOSP IP/OBS SF/LOW 25: CPT | Mod: ,,, | Performed by: HOSPITALIST

## 2017-12-05 RX ORDER — BACLOFEN 10 MG/1
20 TABLET ORAL 3 TIMES DAILY
Status: DISCONTINUED | OUTPATIENT
Start: 2017-12-05 | End: 2017-12-19 | Stop reason: HOSPADM

## 2017-12-05 RX ADMIN — BACLOFEN 20 MG: 10 TABLET ORAL at 10:12

## 2017-12-05 RX ADMIN — HYDROCODONE BITARTRATE AND ACETAMINOPHEN 1 TABLET: 10; 325 TABLET ORAL at 10:12

## 2017-12-05 RX ADMIN — HYDROCODONE BITARTRATE AND ACETAMINOPHEN 1 TABLET: 10; 325 TABLET ORAL at 12:12

## 2017-12-05 RX ADMIN — HYDROCODONE BITARTRATE AND ACETAMINOPHEN 1 TABLET: 10; 325 TABLET ORAL at 05:12

## 2017-12-05 RX ADMIN — LIDOCAINE 1 PATCH: 50 PATCH CUTANEOUS at 03:12

## 2017-12-05 RX ADMIN — GABAPENTIN 900 MG: 300 CAPSULE ORAL at 05:12

## 2017-12-05 RX ADMIN — GABAPENTIN 900 MG: 300 CAPSULE ORAL at 01:12

## 2017-12-05 RX ADMIN — GABAPENTIN 900 MG: 300 CAPSULE ORAL at 10:12

## 2017-12-05 RX ADMIN — BACLOFEN 20 MG: 10 TABLET ORAL at 01:12

## 2017-12-05 RX ADMIN — VANCOMYCIN HYDROCHLORIDE 1750 MG: 10 INJECTION, POWDER, LYOPHILIZED, FOR SOLUTION INTRAVENOUS at 05:12

## 2017-12-05 RX ADMIN — HYDROCODONE BITARTRATE AND ACETAMINOPHEN 1 TABLET: 10; 325 TABLET ORAL at 07:12

## 2017-12-05 RX ADMIN — STANDARDIZED SENNA CONCENTRATE AND DOCUSATE SODIUM 1 TABLET: 8.6; 5 TABLET, FILM COATED ORAL at 09:12

## 2017-12-05 RX ADMIN — HYDROCODONE BITARTRATE AND ACETAMINOPHEN 1 TABLET: 10; 325 TABLET ORAL at 03:12

## 2017-12-05 RX ADMIN — BACLOFEN 10 MG: 10 TABLET ORAL at 05:12

## 2017-12-05 RX ADMIN — ENOXAPARIN SODIUM 40 MG: 100 INJECTION SUBCUTANEOUS at 05:12

## 2017-12-05 RX ADMIN — VANCOMYCIN HYDROCHLORIDE 1750 MG: 10 INJECTION, POWDER, LYOPHILIZED, FOR SOLUTION INTRAVENOUS at 10:12

## 2017-12-05 RX ADMIN — STANDARDIZED SENNA CONCENTRATE AND DOCUSATE SODIUM 1 TABLET: 8.6; 5 TABLET, FILM COATED ORAL at 10:12

## 2017-12-05 RX ADMIN — VANCOMYCIN HYDROCHLORIDE 1750 MG: 10 INJECTION, POWDER, LYOPHILIZED, FOR SOLUTION INTRAVENOUS at 03:12

## 2017-12-05 RX ADMIN — CEFEPIME 2 G: 2 INJECTION, POWDER, FOR SOLUTION INTRAVENOUS at 06:12

## 2017-12-05 NOTE — PLAN OF CARE
Problem: Patient Care Overview  Goal: Plan of Care Review  Outcome: Ongoing (interventions implemented as appropriate)  No acute events overnight. AAOx4. Ambulating in hallways w/ walker. Pain always @ a 10/10 despite PRN medication and other interventions. IV abx infused. Will continue to monitor.

## 2017-12-05 NOTE — ASSESSMENT & PLAN NOTE
- Stable  - NSGY and ID following, appreciate assistance  - CT lumbar spine concerning for spondylodiscitis at L4-L5, now s/p aspiration with path showing acute and chronic OM, though blood and urine cultures remain negative to date.  - ESR, CRP mildly elevated earlier this admission.   - Procal normal  - HIV Abs negative  - PT/OT following  - Pain control  - Patient on ceftriaxone and vancomycin as ID's recommendation- desire 6-8 weeks of tx- end date Jan 1st at earliest   - Will plan for follow up in NSGY clinic once ABX are done with MRI w/wo contrast & infectious labs.   - Vancomycin dose looking good based on troughs, will repeat trough with next set of labs

## 2017-12-05 NOTE — PLAN OF CARE
Pt awake in bed with no distress noted at this time. VSS, remains afebrile. Pain reported to R hip and lower back ongoing with pain level of 9/10 mildly relieved with Oxy. Baclofen, Neurontin and PRN pain med administered as ordered. Pt ambulates off unit frequently with walker. Continue to monitor.

## 2017-12-05 NOTE — ASSESSMENT & PLAN NOTE
- Stable  - Patient with discitis and acute on chronic osteomyelitis of lumbar region. Pain mostly unchanged from yesterday.  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, which complicates our discharge planning and pain control regimen  - Symptomatic treatment with current pain regiment- gabapentin 900mg TID, Ibprofen 600mg Q6, TID PRN and PRN Norco 10mg-325mg for break-through pain; Started on lidocaine patch and transitioned from flexeril to baclofen previous day  -Will increase baclofen today   - Continue to monitor

## 2017-12-05 NOTE — PROGRESS NOTES
[facility] Hospital Medicine  Progress Note    Patient Name: Evgeny Wilde  MRN: [id number]  Patient Class: IP- Inpatient   Admission Date: 11/13/2017  Length of Stay: 22 days  Attending Physician: Sara Arauz MD  Primary Care Provider: Primary Doctor [facility] Medicine Team: [facility] 2 Mateo Carlson MD    Subjective:     Principal Problem:Discitis of lumbar region    HPI:  32 years old male with no significant past medical Hx, present to the ED with progressive lower back pain with sciatica for the last month. He works on constructions, pain start suddenly 1 month ago, at the lower back, dull, aggravated with movements. No inciting factors. Went to [facility] and [facility] ER, he had CT and MRI and they told him he had disk problem and he need neuro follow up and provided with muscle relaxant and narcotic for pain control. Patient stated the medicine is gone and the ain is worse with numbness in his left leg. He is not working for the last month. Denied any trauma, recent infection, surgery, foreign body, recent IV drug abuse. Also denied any fever, mekhi loss, fecal or urine incontinence, saddle anaesthesia.          Hospital Course:  11/14: NSGY following             Blood cultures NGTD, Urine cultures pending.              CT lumbar spine concerning for spondylodiscitis.  11/15: afebrile overnight, blood CX NGTD, NSGY stated no indications for NSGY interventions this admission, they want follow him up as outpatient after after Abx. We will pursue IR for disk Bx/aspirate for Dx.     11/16: NAEON. Vitals stable. Pending disc aspiration via IR tomorrow.  11/17: NAEON. Vitals stable. Planned for L4-L5 biopsy today.  11/18: NAEON. Vitals stable. S/p L4-L5 disc aspiration. Cultures, pathology report pending.  11/19: NAEON. Vitals stable.S/p L4-L5 disc aspiration. Cultures, pathology report pending.  11/20: NAEON. Vitals stable. Pathology showing acute and chronic OM. Cultures remain  negative.  11/21: NAEON. Vitals stable. Patient with trouble sleeping last night with pain in the buttocks and back as well as numbness in the toes. Pt still without adequate pain control.  11/22: NAEON. VSS. Patient with increased pain issues today with pain radiating from the back down to his feet.   11/22: NAEON. VSS. Patient with better pain control today.  11/23: NAEON. VSS. Patient with better pain control today.  11/24: NAEON. VSS. Patient asking for increased frequency of pain meds today.  11/27: NAEON. VSS.   11/28: NAEON. VSS. Patient with spasms still but no other complaints.  11/29: NAEON. VSS. Patient with spasms but no other complaints.  11/30: NAEON. VSS. Patient with spasms but no other complaints.  12/2: NAEON. VSS. Patient with continued pain and spasms.  12/3: NAEON. VSS. Patient with continued pain and spasms.  12/4:  Patient with lumbar back pain that was different than normal back pain.  Given 5 mg of oxycodone with some pain relief.  Remains afebrile with normal vitals.        Interval History: Mr. Wilde reports difficulty sleeping secondary to muscle spasm.  Transitioned to baclofen yesterday with minimal to no improvement in symptoms.  Continues to be afebrile but has been intermittently tachycardic which coincides with increase in pain per patient.      Review of Systems   Constitutional: Negative for chills, fever and unexpected weight change.   HENT: Negative for hearing loss.    Eyes: Negative for visual disturbance.   Respiratory: Negative for shortness of breath.    Cardiovascular: Negative for chest pain.   Gastrointestinal: Negative for abdominal pain, diarrhea, nausea and vomiting.   Genitourinary: Negative for dysuria.   Musculoskeletal: Positive for back pain. Negative for arthralgias.        Back spasms   Skin: Negative for rash.   Neurological: Negative for seizures and numbness (toes).     Objective:     Vital Signs (Most Recent):  Temp: 98.9 °F (37.2 °C) (12/04/17 2000)  Pulse:  91 (12/05/17 0426)  Resp: 18 (12/05/17 0426)  BP: 120/78 (12/05/17 0426)  SpO2: 100 % (12/05/17 0426) Vital Signs (24h Range):  Temp:  [97.6 °F (36.4 °C)-98.9 °F (37.2 °C)] 98.9 °F (37.2 °C)  Pulse:  [] 91  Resp:  [16-18] 18  SpO2:  [97 %-100 %] 100 %  BP: (118-131)/(64-78) 120/78     Weight: 90.2 kg (198 lb 13.7 oz)  Body mass index is 25.53 kg/m².    Intake/Output Summary (Last 24 hours) at 12/05/17 0828  Last data filed at 12/05/17 0546   Gross per 24 hour   Intake             3500 ml   Output             2050 ml   Net             1450 ml      Physical Exam   Constitutional: He is oriented to person, place, and time. He appears well-developed and well-nourished.   HENT:   Head: Normocephalic and atraumatic.   Eyes: EOM are normal. Pupils are equal, round, and reactive to light.   Neck: No tracheal deviation present. No thyromegaly present.   Cardiovascular: Normal rate.  Exam reveals no gallop and no friction rub.    No murmur heard.  Pulmonary/Chest: Effort normal and breath sounds normal. He has no wheezes. He has no rales.   Abdominal: There is no tenderness. No hernia.   Musculoskeletal: He exhibits tenderness (lumbar spine ). He exhibits no edema.   Neurological: He is alert and oriented to person, place, and time. No cranial nerve deficit or sensory deficit. He exhibits normal muscle tone.   Skin: Skin is warm. No rash noted.   Psychiatric: He has a normal mood and affect.       Significant Labs: None    Significant Imaging: No new imaging    Assessment/Plan:      * Discitis of lumbar region    - Stable  - NSGY and ID following, appreciate assistance  - CT lumbar spine concerning for spondylodiscitis at L4-L5, now s/p aspiration with path showing acute and chronic OM, though blood and urine cultures remain negative to date.  - ESR, CRP mildly elevated earlier this admission.   - Procal normal  - HIV Abs negative  - PT/OT following  - Pain control  - Patient on ceftriaxone and vancomycin as ID's  recommendation- desire 6-8 weeks of tx- end date Jan 1st at earliest   - Will plan for follow up in NSGY clinic once ABX are done with MRI w/wo contrast & infectious labs.   - Vancomycin dose looking good based on troughs, will repeat trough with next set of labs        History of substance abuse    - Admission drug screen positive for opiates (prescribed to him as an outpatient), but also benzodiazepines, THC, and cocaine  - Not a candidate for PICC placement, so pursuing LTAC placement for long-term IV antibiotics          Acute bilateral low back pain with bilateral sciatica    - Stable  - Patient with discitis and acute on chronic osteomyelitis of lumbar region. Pain mostly unchanged from yesterday.  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, which complicates our discharge planning and pain control regimen  - Symptomatic treatment with current pain regiment- gabapentin 900mg TID, Ibprofen 600mg Q6, TID PRN and PRN Norco 10mg-325mg for break-through pain; Started on lidocaine patch and transitioned from flexeril to baclofen previous day  -Will increase baclofen today   - Continue to monitor              VTE Risk Mitigation         Ordered     enoxaparin injection 40 mg  Daily     Route:  Subcutaneous        11/20/17 1617     Medium Risk of VTE  Once      11/13/17 1910        Dispo:  Pending placement in LTAC since patient is unable to have home infusions given history.       Mateo Carlson MD  Department of Hospital Medicine   [facility]

## 2017-12-05 NOTE — SUBJECTIVE & OBJECTIVE
Interval History: Mr. Wilde reports difficulty sleeping secondary to muscle spasm.  Transitioned to baclofen yesterday with minimal to no improvement in symptoms.  Continues to be afebrile but has been intermittently tachycardic which coincides with increase in pain per patient.      Review of Systems   Constitutional: Negative for chills, fever and unexpected weight change.   HENT: Negative for hearing loss.    Eyes: Negative for visual disturbance.   Respiratory: Negative for shortness of breath.    Cardiovascular: Negative for chest pain.   Gastrointestinal: Negative for abdominal pain, diarrhea, nausea and vomiting.   Genitourinary: Negative for dysuria.   Musculoskeletal: Positive for back pain. Negative for arthralgias.        Back spasms   Skin: Negative for rash.   Neurological: Negative for seizures and numbness (toes).     Objective:     Vital Signs (Most Recent):  Temp: 98.9 °F (37.2 °C) (12/04/17 2000)  Pulse: 91 (12/05/17 0426)  Resp: 18 (12/05/17 0426)  BP: 120/78 (12/05/17 0426)  SpO2: 100 % (12/05/17 0426) Vital Signs (24h Range):  Temp:  [97.6 °F (36.4 °C)-98.9 °F (37.2 °C)] 98.9 °F (37.2 °C)  Pulse:  [] 91  Resp:  [16-18] 18  SpO2:  [97 %-100 %] 100 %  BP: (118-131)/(64-78) 120/78     Weight: 90.2 kg (198 lb 13.7 oz)  Body mass index is 25.53 kg/m².    Intake/Output Summary (Last 24 hours) at 12/05/17 0828  Last data filed at 12/05/17 0546   Gross per 24 hour   Intake             3500 ml   Output             2050 ml   Net             1450 ml      Physical Exam   Constitutional: He is oriented to person, place, and time. He appears well-developed and well-nourished.   HENT:   Head: Normocephalic and atraumatic.   Eyes: EOM are normal. Pupils are equal, round, and reactive to light.   Neck: No tracheal deviation present. No thyromegaly present.   Cardiovascular: Normal rate.  Exam reveals no gallop and no friction rub.    No murmur heard.  Pulmonary/Chest: Effort normal and breath sounds normal.  He has no wheezes. He has no rales.   Abdominal: There is no tenderness. No hernia.   Musculoskeletal: He exhibits tenderness (lumbar spine ). He exhibits no edema.   Neurological: He is alert and oriented to person, place, and time. No cranial nerve deficit or sensory deficit. He exhibits normal muscle tone.   Skin: Skin is warm. No rash noted.   Psychiatric: He has a normal mood and affect.       Significant Labs: None    Significant Imaging: No new imaging

## 2017-12-06 LAB
ANION GAP SERPL CALC-SCNC: 10 MMOL/L
BASOPHILS # BLD AUTO: 0.01 K/UL
BASOPHILS NFR BLD: 0.2 %
BUN SERPL-MCNC: 10 MG/DL
CALCIUM SERPL-MCNC: 10 MG/DL
CHLORIDE SERPL-SCNC: 102 MMOL/L
CO2 SERPL-SCNC: 28 MMOL/L
CREAT SERPL-MCNC: 0.8 MG/DL
DIFFERENTIAL METHOD: ABNORMAL
EOSINOPHIL # BLD AUTO: 0.1 K/UL
EOSINOPHIL NFR BLD: 1.7 %
ERYTHROCYTE [DISTWIDTH] IN BLOOD BY AUTOMATED COUNT: 14.7 %
EST. GFR  (AFRICAN AMERICAN): >60 ML/MIN/1.73 M^2
EST. GFR  (NON AFRICAN AMERICAN): >60 ML/MIN/1.73 M^2
GLUCOSE SERPL-MCNC: 98 MG/DL
HCT VFR BLD AUTO: 36.5 %
HGB BLD-MCNC: 11.4 G/DL
IMM GRANULOCYTES # BLD AUTO: 0.02 K/UL
IMM GRANULOCYTES NFR BLD AUTO: 0.4 %
LYMPHOCYTES # BLD AUTO: 1.5 K/UL
LYMPHOCYTES NFR BLD: 31.2 %
MCH RBC QN AUTO: 27.1 PG
MCHC RBC AUTO-ENTMCNC: 31.2 G/DL
MCV RBC AUTO: 87 FL
MONOCYTES # BLD AUTO: 0.9 K/UL
MONOCYTES NFR BLD: 19.1 %
NEUTROPHILS # BLD AUTO: 2.2 K/UL
NEUTROPHILS NFR BLD: 47.4 %
NRBC BLD-RTO: 0 /100 WBC
PLATELET # BLD AUTO: 248 K/UL
PMV BLD AUTO: 9.9 FL
POTASSIUM SERPL-SCNC: 4.1 MMOL/L
RBC # BLD AUTO: 4.2 M/UL
SODIUM SERPL-SCNC: 140 MMOL/L
VANCOMYCIN TROUGH SERPL-MCNC: 19.9 UG/ML
WBC # BLD AUTO: 4.71 K/UL

## 2017-12-06 PROCEDURE — 20600001 HC STEP DOWN PRIVATE ROOM

## 2017-12-06 PROCEDURE — 25000003 PHARM REV CODE 250: Performed by: HOSPITALIST

## 2017-12-06 PROCEDURE — 25000003 PHARM REV CODE 250: Performed by: SURGERY

## 2017-12-06 PROCEDURE — 25000003 PHARM REV CODE 250: Performed by: INTERNAL MEDICINE

## 2017-12-06 PROCEDURE — 63600175 PHARM REV CODE 636 W HCPCS: Performed by: INTERNAL MEDICINE

## 2017-12-06 PROCEDURE — 25000003 PHARM REV CODE 250: Performed by: STUDENT IN AN ORGANIZED HEALTH CARE EDUCATION/TRAINING PROGRAM

## 2017-12-06 PROCEDURE — 80048 BASIC METABOLIC PNL TOTAL CA: CPT

## 2017-12-06 PROCEDURE — 99231 SBSQ HOSP IP/OBS SF/LOW 25: CPT | Mod: ,,, | Performed by: HOSPITALIST

## 2017-12-06 PROCEDURE — 36415 COLL VENOUS BLD VENIPUNCTURE: CPT

## 2017-12-06 PROCEDURE — 80202 ASSAY OF VANCOMYCIN: CPT

## 2017-12-06 PROCEDURE — 85025 COMPLETE CBC W/AUTO DIFF WBC: CPT

## 2017-12-06 RX ADMIN — HYDROCODONE BITARTRATE AND ACETAMINOPHEN 1 TABLET: 10; 325 TABLET ORAL at 01:12

## 2017-12-06 RX ADMIN — RAMELTEON 8 MG: 8 TABLET, FILM COATED ORAL at 10:12

## 2017-12-06 RX ADMIN — STANDARDIZED SENNA CONCENTRATE AND DOCUSATE SODIUM 1 TABLET: 8.6; 5 TABLET, FILM COATED ORAL at 08:12

## 2017-12-06 RX ADMIN — VANCOMYCIN HYDROCHLORIDE 1750 MG: 10 INJECTION, POWDER, LYOPHILIZED, FOR SOLUTION INTRAVENOUS at 07:12

## 2017-12-06 RX ADMIN — GABAPENTIN 900 MG: 300 CAPSULE ORAL at 09:12

## 2017-12-06 RX ADMIN — HYDROCODONE BITARTRATE AND ACETAMINOPHEN 1 TABLET: 10; 325 TABLET ORAL at 09:12

## 2017-12-06 RX ADMIN — BACLOFEN 20 MG: 10 TABLET ORAL at 09:12

## 2017-12-06 RX ADMIN — CEFEPIME 2 G: 2 INJECTION, POWDER, FOR SOLUTION INTRAVENOUS at 06:12

## 2017-12-06 RX ADMIN — BACLOFEN 20 MG: 10 TABLET ORAL at 05:12

## 2017-12-06 RX ADMIN — VANCOMYCIN HYDROCHLORIDE 1750 MG: 10 INJECTION, POWDER, LYOPHILIZED, FOR SOLUTION INTRAVENOUS at 04:12

## 2017-12-06 RX ADMIN — HYDROCODONE BITARTRATE AND ACETAMINOPHEN 1 TABLET: 10; 325 TABLET ORAL at 05:12

## 2017-12-06 RX ADMIN — IBUPROFEN 600 MG: 200 TABLET, FILM COATED ORAL at 03:12

## 2017-12-06 RX ADMIN — ENOXAPARIN SODIUM 40 MG: 100 INJECTION SUBCUTANEOUS at 04:12

## 2017-12-06 RX ADMIN — GABAPENTIN 900 MG: 300 CAPSULE ORAL at 02:12

## 2017-12-06 RX ADMIN — LIDOCAINE 1 PATCH: 50 PATCH CUTANEOUS at 02:12

## 2017-12-06 RX ADMIN — STANDARDIZED SENNA CONCENTRATE AND DOCUSATE SODIUM 1 TABLET: 8.6; 5 TABLET, FILM COATED ORAL at 09:12

## 2017-12-06 RX ADMIN — GABAPENTIN 900 MG: 300 CAPSULE ORAL at 07:12

## 2017-12-06 RX ADMIN — BACLOFEN 20 MG: 10 TABLET ORAL at 02:12

## 2017-12-06 RX ADMIN — HYDROCODONE BITARTRATE AND ACETAMINOPHEN 1 TABLET: 10; 325 TABLET ORAL at 04:12

## 2017-12-06 RX ADMIN — HYDROCODONE BITARTRATE AND ACETAMINOPHEN 1 TABLET: 10; 325 TABLET ORAL at 11:12

## 2017-12-06 NOTE — PLAN OF CARE
Problem: Patient Care Overview  Goal: Plan of Care Review  Outcome: Ongoing (interventions implemented as appropriate)  Pt vss. Pt remained free from falls. Call light and personal items in reach. Pt encouraged to use hot packs in between pain meds. No new changes overnight.

## 2017-12-06 NOTE — ASSESSMENT & PLAN NOTE
- Stable  - NSGY and ID following, appreciate assistance  - CT lumbar spine concerning for spondylodiscitis at L4-L5, now s/p aspiration with path showing acute and chronic OM, though blood and urine cultures remain negative to date.  - ESR, CRP mildly elevated earlier this admission.   - Procal normal  - HIV Abs negative  - PT/OT following  - Pain control  - Patient on ceftriaxone and vancomycin as ID's recommendation- desire 6-8 weeks of tx- end date Jan 1st at earliest   - Will plan for follow up in NSGY clinic once ABX are done with MRI w/wo contrast & infectious labs.   - Vancomycin dose looking good based on troughs,  trough today 19.9

## 2017-12-06 NOTE — ASSESSMENT & PLAN NOTE
- Stable  - Patient with discitis and acute on chronic osteomyelitis of lumbar region. Pain mostly unchanged from yesterday.  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, which complicates our discharge planning and pain control regimen  - Symptomatic treatment with current pain regiment- gabapentin 900mg TID, Ibprofen 600mg Q6, TID PRN and PRN Norco 10mg-325mg for break-through pain; Started on lidocaine patch and transitioned from flexeril to baclofen previous day  -Will increase baclofen yesterday  - Continue to monitor

## 2017-12-06 NOTE — SUBJECTIVE & OBJECTIVE
Interval History:  Back pain and muslce spasm improvied, patient able to sleep at night for the first time in 2 days, vanc level 19.9.  Review of Systems   Constitutional: Negative for chills, fever and unexpected weight change.   HENT: Negative for hearing loss.    Eyes: Negative for visual disturbance.   Respiratory: Negative for shortness of breath.    Cardiovascular: Negative for chest pain.   Gastrointestinal: Negative for abdominal pain, diarrhea, nausea and vomiting.   Genitourinary: Negative for dysuria.   Musculoskeletal: Positive for back pain. Negative for arthralgias.        Back spasms   Skin: Negative for rash.   Neurological: Negative for seizures and numbness (toes).     Objective:     Vital Signs (Most Recent):  Temp: 97.5 °F (36.4 °C) (12/06/17 0725)  Pulse: 94 (12/06/17 0725)  Resp: 16 (12/06/17 0725)  BP: 130/80 (12/06/17 0725)  SpO2: 99 % (12/06/17 0725) Vital Signs (24h Range):  Temp:  [97.2 °F (36.2 °C)-98.5 °F (36.9 °C)] 97.5 °F (36.4 °C)  Pulse:  [] 94  Resp:  [16-18] 16  SpO2:  [98 %-100 %] 99 %  BP: (111-130)/(56-80) 130/80     Weight: 90.2 kg (198 lb 13.7 oz)  Body mass index is 25.53 kg/m².    Intake/Output Summary (Last 24 hours) at 12/06/17 1005  Last data filed at 12/06/17 0900   Gross per 24 hour   Intake              240 ml   Output                0 ml   Net              240 ml      Physical Exam   Constitutional: He is oriented to person, place, and time. He appears well-developed and well-nourished.   HENT:   Head: Normocephalic and atraumatic.   Eyes: EOM are normal. Pupils are equal, round, and reactive to light. No scleral icterus.   Neck: No tracheal deviation present. No thyromegaly present.   Cardiovascular: Normal rate.  Exam reveals no gallop and no friction rub.    No murmur heard.  Pulmonary/Chest: Effort normal and breath sounds normal. He has no wheezes. He has no rales.   Abdominal: Soft. Bowel sounds are normal. There is no tenderness. No hernia.    Musculoskeletal: He exhibits tenderness (lumbar spine ). He exhibits no edema.   Neurological: He is alert and oriented to person, place, and time. No cranial nerve deficit or sensory deficit. He exhibits normal muscle tone.   Skin: Skin is warm. No rash noted. No pallor.   Psychiatric: He has a normal mood and affect. His behavior is normal.       Significant Labs:   CBC:   Recent Labs  Lab 12/06/17  0341   WBC 4.71   HGB 11.4*   HCT 36.5*        CMP:   Recent Labs  Lab 12/06/17  0341      K 4.1      CO2 28   GLU 98   BUN 10   CREATININE 0.8   CALCIUM 10.0   ANIONGAP 10   EGFRNONAA >60.0     None    Significant Imaging: No new imaging

## 2017-12-06 NOTE — PROGRESS NOTES
[facility] Hospital Medicine  Progress Note    Patient Name: Evgeny Wilde  MRN: [id number]  Patient Class: IP- Inpatient   Admission Date: 11/13/2017  Length of Stay: 23 days  Attending Physician: Sara Arauz MD  Primary Care Provider: Primary Doctor [facility] Medicine Team: [facility] 2 MIRELLA Carson    Subjective:     Principal Problem:Discitis of lumbar region    HPI:  32 years old male with no significant past medical Hx, present to the ED with progressive lower back pain with sciatica for the last month. He works on constructions, pain start suddenly 1 month ago, at the lower back, dull, aggravated with movements. No inciting factors. Went to [facility] ER, he had CT and MRI and they told him he had disk problem and he need neuro follow up and provided with muscle relaxant and narcotic for pain control. Patient stated the medicine is gone and the ain is worse with numbness in his left leg. He is not working for the last month. Denied any trauma, recent infection, surgery, foreign body, recent IV drug abuse. Also denied any fever, mekhi loss, fecal or urine incontinence, saddle anaesthesia.          Hospital Course:  11/14: NSGY following             Blood cultures NGTD, Urine cultures pending.              CT lumbar spine concerning for spondylodiscitis.  11/15: afebrile overnight, blood CX NGTD, NSGY stated no indications for NSGY interventions this admission, they want follow him up as outpatient after after Abx. We will pursue IR for disk Bx/aspirate for Dx.     11/16: NAEON. Vitals stable. Pending disc aspiration via IR tomorrow.  11/17: NAEON. Vitals stable. Planned for L4-L5 biopsy today.  11/18: NAEON. Vitals stable. S/p L4-L5 disc aspiration. Cultures, pathology report pending.  11/19: NAEON. Vitals stable.S/p L4-L5 disc aspiration. Cultures, pathology report pending.  11/20: NAEON. Vitals stable. Pathology showing acute and chronic OM. Cultures remain  negative.  11/21: NAEON. Vitals stable. Patient with trouble sleeping last night with pain in the buttocks and back as well as numbness in the toes. Pt still without adequate pain control.  11/22: NAEON. VSS. Patient with increased pain issues today with pain radiating from the back down to his feet.   11/22: NAEON. VSS. Patient with better pain control today.  11/23: NAEON. VSS. Patient with better pain control today.  11/24: NAEON. VSS. Patient asking for increased frequency of pain meds today.  11/27: NAEON. VSS.   11/28: NAEON. VSS. Patient with spasms still but no other complaints.  11/29: NAEON. VSS. Patient with spasms but no other complaints.  11/30: NAEON. VSS. Patient with spasms but no other complaints.  12/2: NAEON. VSS. Patient with continued pain and spasms.  12/3: NAEON. VSS. Patient with continued pain and spasms.  12/4:  Patient with lumbar back pain that was different than normal back pain.  Given 5 mg of oxycodone with some pain relief.  Remains afebrile with normal vitals.    12/5  Back pain and muslce spasm improvied, patient able to sleep at night for the first time in 2 days, vanc level 19.9.    Interval History:  Back pain and muslce spasm improvied, patient able to sleep at night for the first time in 2 days, vanc level 19.9.  Review of Systems   Constitutional: Negative for chills, fever and unexpected weight change.   HENT: Negative for hearing loss.    Eyes: Negative for visual disturbance.   Respiratory: Negative for shortness of breath.    Cardiovascular: Negative for chest pain.   Gastrointestinal: Negative for abdominal pain, diarrhea, nausea and vomiting.   Genitourinary: Negative for dysuria.   Musculoskeletal: Positive for back pain. Negative for arthralgias.        Back spasms   Skin: Negative for rash.   Neurological: Negative for seizures and numbness (toes).     Objective:     Vital Signs (Most Recent):  Temp: 97.5 °F (36.4 °C) (12/06/17 0725)  Pulse: 94 (12/06/17 0725)  Resp:  16 (12/06/17 0725)  BP: 130/80 (12/06/17 0725)  SpO2: 99 % (12/06/17 0725) Vital Signs (24h Range):  Temp:  [97.2 °F (36.2 °C)-98.5 °F (36.9 °C)] 97.5 °F (36.4 °C)  Pulse:  [] 94  Resp:  [16-18] 16  SpO2:  [98 %-100 %] 99 %  BP: (111-130)/(56-80) 130/80     Weight: 90.2 kg (198 lb 13.7 oz)  Body mass index is 25.53 kg/m².    Intake/Output Summary (Last 24 hours) at 12/06/17 1005  Last data filed at 12/06/17 0900   Gross per 24 hour   Intake              240 ml   Output                0 ml   Net              240 ml      Physical Exam   Constitutional: He is oriented to person, place, and time. He appears well-developed and well-nourished.   HENT:   Head: Normocephalic and atraumatic.   Eyes: EOM are normal. Pupils are equal, round, and reactive to light. No scleral icterus.   Neck: No tracheal deviation present. No thyromegaly present.   Cardiovascular: Normal rate.  Exam reveals no gallop and no friction rub.    No murmur heard.  Pulmonary/Chest: Effort normal and breath sounds normal. He has no wheezes. He has no rales.   Abdominal: Soft. Bowel sounds are normal. There is no tenderness. No hernia.   Musculoskeletal: He exhibits tenderness (lumbar spine ). He exhibits no edema.   Neurological: He is alert and oriented to person, place, and time. No cranial nerve deficit or sensory deficit. He exhibits normal muscle tone.   Skin: Skin is warm. No rash noted. No pallor.   Psychiatric: He has a normal mood and affect. His behavior is normal.       Significant Labs:   CBC:   Recent Labs  Lab 12/06/17  0341   WBC 4.71   HGB 11.4*   HCT 36.5*        CMP:   Recent Labs  Lab 12/06/17  0341      K 4.1      CO2 28   GLU 98   BUN 10   CREATININE 0.8   CALCIUM 10.0   ANIONGAP 10   EGFRNONAA >60.0     None    Significant Imaging: No new imaging    Assessment/Plan:      * Discitis of lumbar region    - Stable  - NSGY and ID following, appreciate assistance  - CT lumbar spine concerning for  spondylodiscitis at L4-L5, now s/p aspiration with path showing acute and chronic OM, though blood and urine cultures remain negative to date.  - ESR, CRP mildly elevated earlier this admission.   - Procal normal  - HIV Abs negative  - PT/OT following  - Pain control  - Patient on ceftriaxone and vancomycin as ID's recommendation- desire 6-8 weeks of tx- end date Jan 1st at earliest   - Will plan for follow up in NSGY clinic once ABX are done with MRI w/wo contrast & infectious labs.   - Vancomycin dose looking good based on troughs,  trough today 19.9        History of substance abuse    - Admission drug screen positive for opiates (prescribed to him as an outpatient), but also benzodiazepines, THC, and cocaine  - Not a candidate for PICC placement, so pursuing LTAC placement for long-term IV antibiotics          Acute bilateral low back pain with bilateral sciatica    - Stable  - Patient with discitis and acute on chronic osteomyelitis of lumbar region. Pain mostly unchanged from yesterday.  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, which complicates our discharge planning and pain control regimen  - Symptomatic treatment with current pain regiment- gabapentin 900mg TID, Ibprofen 600mg Q6, TID PRN and PRN Norco 10mg-325mg for break-through pain; Started on lidocaine patch and transitioned from flexeril to baclofen previous day  -Will increase baclofen yesterday  - Continue to monitor              VTE Risk Mitigation         Ordered     enoxaparin injection 40 mg  Daily     Route:  Subcutaneous        11/20/17 1617     Medium Risk of VTE  Once      11/13/17 1910              MIRELLA Carson  Department of Hospital Medicine   [facility]

## 2017-12-06 NOTE — PLAN OF CARE
Problem: Patient Care Overview  Goal: Plan of Care Review  Outcome: Ongoing (interventions implemented as appropriate)  Patient AAOx4. VSS. Plan of care reviewed with patient. PRN pain medications administered. Patient ambulating off unit with assistance of walker. Patient remains free of falls. IV abx administered.

## 2017-12-07 PROBLEM — Z72.0 TOBACCO USE: Status: ACTIVE | Noted: 2017-12-07

## 2017-12-07 PROBLEM — R23.8 SKIN IRRITATION: Status: ACTIVE | Noted: 2017-12-07

## 2017-12-07 PROCEDURE — 25000003 PHARM REV CODE 250: Performed by: HOSPITALIST

## 2017-12-07 PROCEDURE — 99231 SBSQ HOSP IP/OBS SF/LOW 25: CPT | Mod: ,,, | Performed by: HOSPITALIST

## 2017-12-07 PROCEDURE — 25000003 PHARM REV CODE 250: Performed by: STUDENT IN AN ORGANIZED HEALTH CARE EDUCATION/TRAINING PROGRAM

## 2017-12-07 PROCEDURE — 25000003 PHARM REV CODE 250: Performed by: INTERNAL MEDICINE

## 2017-12-07 PROCEDURE — 97803 MED NUTRITION INDIV SUBSEQ: CPT

## 2017-12-07 PROCEDURE — 63600175 PHARM REV CODE 636 W HCPCS: Performed by: INTERNAL MEDICINE

## 2017-12-07 PROCEDURE — 25000003 PHARM REV CODE 250: Performed by: SURGERY

## 2017-12-07 PROCEDURE — 20600001 HC STEP DOWN PRIVATE ROOM

## 2017-12-07 RX ORDER — NYSTATIN AND TRIAMCINOLONE ACETONIDE 100000; 1 [USP'U]/G; MG/G
CREAM TOPICAL 2 TIMES DAILY
Status: DISCONTINUED | OUTPATIENT
Start: 2017-12-07 | End: 2017-12-19 | Stop reason: HOSPADM

## 2017-12-07 RX ORDER — IBUPROFEN 200 MG
1 TABLET ORAL DAILY
Status: DISCONTINUED | OUTPATIENT
Start: 2017-12-07 | End: 2017-12-19 | Stop reason: HOSPADM

## 2017-12-07 RX ADMIN — BACLOFEN 20 MG: 10 TABLET ORAL at 06:12

## 2017-12-07 RX ADMIN — HYDROCODONE BITARTRATE AND ACETAMINOPHEN 1 TABLET: 10; 325 TABLET ORAL at 02:12

## 2017-12-07 RX ADMIN — NYSTATIN AND TRIAMCINOLONE ACETONIDE: 100000; 1 CREAM TOPICAL at 10:12

## 2017-12-07 RX ADMIN — GABAPENTIN 900 MG: 300 CAPSULE ORAL at 10:12

## 2017-12-07 RX ADMIN — GABAPENTIN 900 MG: 300 CAPSULE ORAL at 06:12

## 2017-12-07 RX ADMIN — STANDARDIZED SENNA CONCENTRATE AND DOCUSATE SODIUM 1 TABLET: 8.6; 5 TABLET, FILM COATED ORAL at 08:12

## 2017-12-07 RX ADMIN — IBUPROFEN 600 MG: 200 TABLET, FILM COATED ORAL at 10:12

## 2017-12-07 RX ADMIN — RAMELTEON 8 MG: 8 TABLET, FILM COATED ORAL at 10:12

## 2017-12-07 RX ADMIN — ENOXAPARIN SODIUM 40 MG: 100 INJECTION SUBCUTANEOUS at 05:12

## 2017-12-07 RX ADMIN — BACLOFEN 20 MG: 10 TABLET ORAL at 02:12

## 2017-12-07 RX ADMIN — LIDOCAINE 1 PATCH: 50 PATCH CUTANEOUS at 02:12

## 2017-12-07 RX ADMIN — VANCOMYCIN HYDROCHLORIDE 1750 MG: 10 INJECTION, POWDER, LYOPHILIZED, FOR SOLUTION INTRAVENOUS at 12:12

## 2017-12-07 RX ADMIN — HYDROCODONE BITARTRATE AND ACETAMINOPHEN 1 TABLET: 10; 325 TABLET ORAL at 08:12

## 2017-12-07 RX ADMIN — BACLOFEN 20 MG: 10 TABLET ORAL at 10:12

## 2017-12-07 RX ADMIN — VANCOMYCIN HYDROCHLORIDE 1750 MG: 10 INJECTION, POWDER, LYOPHILIZED, FOR SOLUTION INTRAVENOUS at 08:12

## 2017-12-07 RX ADMIN — GABAPENTIN 900 MG: 300 CAPSULE ORAL at 02:12

## 2017-12-07 RX ADMIN — CEFEPIME 2 G: 2 INJECTION, POWDER, FOR SOLUTION INTRAVENOUS at 10:12

## 2017-12-07 RX ADMIN — VANCOMYCIN HYDROCHLORIDE 1750 MG: 10 INJECTION, POWDER, LYOPHILIZED, FOR SOLUTION INTRAVENOUS at 05:12

## 2017-12-07 NOTE — ASSESSMENT & PLAN NOTE
· Patient reports going outside to smoke  · Counseled patient on smoking cessation, but reports not interested in quitting at this time  · Will have nicotine patch available

## 2017-12-07 NOTE — ASSESSMENT & PLAN NOTE
- Stable  - NSGY and ID following, appreciate assistance  - CT lumbar spine concerning for spondylodiscitis at L4-L5, now s/p aspiration with path showing acute and chronic OM, though blood and urine cultures remain negative to date.  - ESR, CRP mildly elevated earlier this admission.   - Procal normal  - HIV Abs negative  - PT/OT following  - Pain control  - Patient on ceftriaxone and vancomycin as ID's recommendation- desire 6-8 weeks of tx- end date Jan 1st at earliest   - Will plan for follow up in NSGY clinic once ABX are done with MRI w/wo contrast & infectious labs.   - Vancomycin dose looking good based on troughs,  Last trough 19.9

## 2017-12-07 NOTE — SUBJECTIVE & OBJECTIVE
Interval History: Patient not in room during initial rounds this AM as he went outside and reports smoking a cigarette.  He continues to complain of muscle spasms that are improved with activity as well as skin irritation in the skin folds in the pelvic area.      Review of Systems   Constitutional: Negative for chills, fever and unexpected weight change.   HENT: Negative for hearing loss.    Eyes: Negative for visual disturbance.   Respiratory: Negative for shortness of breath.    Cardiovascular: Negative for chest pain.   Gastrointestinal: Negative for abdominal pain, diarrhea, nausea and vomiting.   Genitourinary: Negative for dysuria.   Musculoskeletal: Positive for back pain. Negative for arthralgias.        Back spasms   Skin: Negative for rash.        Irritation in groin   Neurological: Negative for seizures and numbness.     Objective:     Vital Signs (Most Recent):  Temp: 98.5 °F (36.9 °C) (12/07/17 0733)  Pulse: 105 (12/07/17 0733)  Resp: 16 (12/07/17 0733)  BP: 116/75 (12/07/17 0733)  SpO2: 97 % (12/07/17 0733) Vital Signs (24h Range):  Temp:  [97.4 °F (36.3 °C)-98.5 °F (36.9 °C)] 98.5 °F (36.9 °C)  Pulse:  [] 105  Resp:  [16-18] 16  SpO2:  [96 %-100 %] 97 %  BP: (105-133)/(61-80) 116/75     Weight: 90.2 kg (198 lb 13.7 oz)  Body mass index is 25.53 kg/m².    Intake/Output Summary (Last 24 hours) at 12/07/17 0847  Last data filed at 12/06/17 0900   Gross per 24 hour   Intake              240 ml   Output                0 ml   Net              240 ml      Physical Exam   Constitutional: He is oriented to person, place, and time. He appears well-developed and well-nourished.   HENT:   Head: Normocephalic and atraumatic.   Eyes: EOM are normal. Pupils are equal, round, and reactive to light.   Neck: No tracheal deviation present. No thyromegaly present.   Cardiovascular: Normal rate.  Exam reveals no gallop and no friction rub.    No murmur heard.  Pulmonary/Chest: Effort normal and breath sounds  normal. He has no wheezes. He has no rales.   Abdominal: There is no tenderness. No hernia.   Musculoskeletal: He exhibits tenderness (lumbar spine ). He exhibits no edema.   Neurological: He is alert and oriented to person, place, and time. No cranial nerve deficit or sensory deficit. He exhibits normal muscle tone.   Skin: Skin is warm. There is erythema (bilateral groin).   Psychiatric: He has a normal mood and affect.       Significant Labs: None    Significant Imaging: No new imaging

## 2017-12-07 NOTE — PROGRESS NOTES
[facility] Hospital Medicine  Progress Note    Patient Name: Evgeny Wilde  MRN: [id number]  Patient Class: IP- Inpatient   Admission Date: 11/13/2017  Length of Stay: 24 days  Attending Physician: Sara Arauz MD  Primary Care Provider: Primary Doctor [facility] Medicine Team: [facility] 2 Mateo Carlson MD    Subjective:     Principal Problem:Discitis of lumbar region    HPI:  32 years old male with no significant past medical Hx, present to the ED with progressive lower back pain with sciatica for the last month. He works on constructions, pain start suddenly 1 month ago, at the lower back, dull, aggravated with movements. No inciting factors. Went to [facility] and [facility] ER, he had CT and MRI and they told him he had disk problem and he need neuro follow up and provided with muscle relaxant and narcotic for pain control. Patient stated the medicine is gone and the ain is worse with numbness in his left leg. He is not working for the last month. Denied any trauma, recent infection, surgery, foreign body, recent IV drug abuse. Also denied any fever, mekhi loss, fecal or urine incontinence, saddle anaesthesia.          Hospital Course:  11/14: NSGY following             Blood cultures NGTD, Urine cultures pending.              CT lumbar spine concerning for spondylodiscitis.  11/15: afebrile overnight, blood CX NGTD, NSGY stated no indications for NSGY interventions this admission, they want follow him up as outpatient after after Abx. We will pursue IR for disk Bx/aspirate for Dx.     11/16: NAEON. Vitals stable. Pending disc aspiration via IR tomorrow.  11/17: NAEON. Vitals stable. Planned for L4-L5 biopsy today.  11/18: NAEON. Vitals stable. S/p L4-L5 disc aspiration. Cultures, pathology report pending.  11/19: NAEON. Vitals stable.S/p L4-L5 disc aspiration. Cultures, pathology report pending.  11/20: NAEON. Vitals stable. Pathology showing acute and chronic OM. Cultures remain  negative.  11/21: NAEON. Vitals stable. Patient with trouble sleeping last night with pain in the buttocks and back as well as numbness in the toes. Pt still without adequate pain control.  11/22: NAEON. VSS. Patient with increased pain issues today with pain radiating from the back down to his feet.   11/22: NAEON. VSS. Patient with better pain control today.  11/23: NAEON. VSS. Patient with better pain control today.  11/24: NAEON. VSS. Patient asking for increased frequency of pain meds today.  11/27: NAEON. VSS.   11/28: NAEON. VSS. Patient with spasms still but no other complaints.  11/29: NAEON. VSS. Patient with spasms but no other complaints.  11/30: NAEON. VSS. Patient with spasms but no other complaints.  12/2: NAEON. VSS. Patient with continued pain and spasms.  12/3: NAEON. VSS. Patient with continued pain and spasms.  12/4:  Patient with lumbar back pain that was different than normal back pain.  Given 5 mg of oxycodone with some pain relief.  Remains afebrile with normal vitals. 12/5: Mr. Wilde reports difficulty sleeping secondary to muscle spasm.  Transitioned to baclofen yesterday with minimal to no improvement in symptoms.  Continues to be afebrile but has been intermittently tachycardic which coincides with increase in pain per patient  12/6  Back pain and muslce spasm improvied, patient able to sleep at night for the first time in 2 days, vanc level 19.9.    Interval History: Patient not in room during initial rounds this AM as he went outside and reports smoking a cigarette.  He continues to complain of muscle spasms that are improved with activity as well as skin irritation in the skin folds in the pelvic area.      Review of Systems   Constitutional: Negative for chills, fever and unexpected weight change.   HENT: Negative for hearing loss.    Eyes: Negative for visual disturbance.   Respiratory: Negative for shortness of breath.    Cardiovascular: Negative for chest pain.   Gastrointestinal:  Negative for abdominal pain, diarrhea, nausea and vomiting.   Genitourinary: Negative for dysuria.   Musculoskeletal: Positive for back pain. Negative for arthralgias.        Back spasms   Skin: Negative for rash.        Irritation in groin   Neurological: Negative for seizures and numbness.     Objective:     Vital Signs (Most Recent):  Temp: 98.5 °F (36.9 °C) (12/07/17 0733)  Pulse: 105 (12/07/17 0733)  Resp: 16 (12/07/17 0733)  BP: 116/75 (12/07/17 0733)  SpO2: 97 % (12/07/17 0733) Vital Signs (24h Range):  Temp:  [97.4 °F (36.3 °C)-98.5 °F (36.9 °C)] 98.5 °F (36.9 °C)  Pulse:  [] 105  Resp:  [16-18] 16  SpO2:  [96 %-100 %] 97 %  BP: (105-133)/(61-80) 116/75     Weight: 90.2 kg (198 lb 13.7 oz)  Body mass index is 25.53 kg/m².    Intake/Output Summary (Last 24 hours) at 12/07/17 0847  Last data filed at 12/06/17 0900   Gross per 24 hour   Intake              240 ml   Output                0 ml   Net              240 ml      Physical Exam   Constitutional: He is oriented to person, place, and time. He appears well-developed and well-nourished.   HENT:   Head: Normocephalic and atraumatic.   Eyes: EOM are normal. Pupils are equal, round, and reactive to light.   Neck: No tracheal deviation present. No thyromegaly present.   Cardiovascular: Normal rate.  Exam reveals no gallop and no friction rub.    No murmur heard.  Pulmonary/Chest: Effort normal and breath sounds normal. He has no wheezes. He has no rales.   Abdominal: There is no tenderness. No hernia.   Musculoskeletal: He exhibits tenderness (lumbar spine ). He exhibits no edema.   Neurological: He is alert and oriented to person, place, and time. No cranial nerve deficit or sensory deficit. He exhibits normal muscle tone.   Skin: Skin is warm. There is erythema (bilateral groin).   Psychiatric: He has a normal mood and affect.       Significant Labs: None    Significant Imaging: No new imaging    Assessment/Plan:      * Discitis of lumbar region    -  Stable  - NSGY and ID following, appreciate assistance  - CT lumbar spine concerning for spondylodiscitis at L4-L5, now s/p aspiration with path showing acute and chronic OM, though blood and urine cultures remain negative to date.  - ESR, CRP mildly elevated earlier this admission.   - Procal normal  - HIV Abs negative  - PT/OT following  - Pain control  - Patient on ceftriaxone and vancomycin as ID's recommendation- desire 6-8 weeks of tx- end date Jan 1st at earliest   - Will plan for follow up in NSGY clinic once ABX are done with MRI w/wo contrast & infectious labs.   - Vancomycin dose looking good based on troughs,  Last trough 19.9        Tobacco use    · Patient reports going outside to smoke  · Counseled patient on smoking cessation, but reports not interested in quitting at this time  · Will have nicotine patch available          Skin irritation    · Irritation in the skin folds of the pelvic area  · Will start barrier cream          History of substance abuse    - Admission drug screen positive for opiates (prescribed to him as an outpatient), but also benzodiazepines, THC, and cocaine  - Not a candidate for PICC placement, so pursuing LTAC placement for long-term IV antibiotics          Acute bilateral low back pain with bilateral sciatica    - Stable  - Patient with discitis and acute on chronic osteomyelitis of lumbar region. Pain mostly unchanged from yesterday.  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, which complicates our discharge planning and pain control regimen  - Symptomatic treatment with current pain regiment- gabapentin 900mg TID, Ibprofen 600mg Q6, TID PRN and lidocaine patch, baclofen 20mg TID and PRN Norco 10mg-325mg for break-through pain  - Continue to monitor              VTE Risk Mitigation         Ordered     enoxaparin injection 40 mg  Daily     Route:  Subcutaneous        11/20/17 1617     Medium Risk of VTE  Once      11/13/17 1910        Dispo:  Pending LTAC placement      Mateo GARCIA  MD Aldo  Department of Hospital Medicine   [facility]

## 2017-12-07 NOTE — PLAN OF CARE
Problem: Patient Care Overview  Goal: Plan of Care Review  Outcome: Ongoing (interventions implemented as appropriate)  Pt remained free from falls. Call light and personal items in reach. Pt waiting to be d/c to LTAC.

## 2017-12-07 NOTE — PLAN OF CARE
Problem: Patient Care Overview  Goal: Plan of Care Review  Outcome: Ongoing (interventions implemented as appropriate)  Patient AAOx4. VSS. PRN pain medications administered to patient. Patient ambulating around unit. Patient remains free of falls. No acute changes. IV abx administered.

## 2017-12-07 NOTE — PROGRESS NOTES
"  [facility]  Adult Nutrition  Consult Note    SUMMARY     Recommendations    1. Continue current regular diet.   2. RD to monitor & follow-up.    Goals: PO intake >50%  Nutrition Goal Status: new  Communication of RD Recs: reviewed with RN    Reason for Assessment    Reason for Assessment: RD follow-up  Diagnosis: other (see comments) (Back pain)  Relevent Medical History: No sign. PMH   Interdisciplinary Rounds: did not attend     General Information Comments: Pt with good appetite, consuming 100% of meals.  Nutrition Discharge Planning: Adequate PO intake    Nutrition Prescription Ordered    Current Diet Order: Regular    Nutrition/Diet History    Patient Reported Diet/Restrictions/Preferences: general     Factors Affecting Nutritional Intake: other (see comments) (None)    Labs/Tests/Procedures/Meds    Pertinent Labs Reviewed: reviewed, pertinent  Pertinent Labs Comments: Stable  Pertinent Medications Reviewed: reviewed, pertinent    Physical Findings    Overall Physical Appearance: nourished  Oral/Mouth Cavity: WDL  Skin: other (see comments) (Incision - back)    Anthropometrics    Height: 6' 2" (188 cm)  Weight Method: Bed Scale  Weight: 90.2 kg (198 lb 13.7 oz)    Ideal Body Weight (IBW), Male: 190 lb  % Ideal Body Weight, Male (lb): 104.66 lb     BMI (Calculated): 25.6  BMI Grade: 25 - 29.9 - overweight    Estimated/Assessed Needs    Weight Used For Calorie Calculations: 90.2 kg (198 lb 13.7 oz)      Energy Calorie Requirements (kcal): 2300 kcal/d  Energy Need Method: Lynndyl-St Jeor (1.2 PAL)     Weight Used For Protein Calculations: 90.2 kg (198 lb 13.7 oz)  Protein Requirements: 90 g/d (1 g/kg)     Fluid Need Method: RDA Method, other (see comments) (Per MD or 1 mL/kcal)    Assessment and Plan    No nutritional dx at this time.    Monitor and Evaluation    Food and Nutrient Intake: energy intake, food and beverage intake  Food and Nutrient Adminstration: diet order     Physical " Activity and Function: nutrition-related ADLs and IADLs  Anthropometric Measurements: weight, weight change  Biochemical Data, Medical Tests and Procedures: lipid profile, gastrointestinal profile, glucose/endocrine profile, inflammatory profile, electrolyte and renal panel  Nutrition-Focused Physical Findings: overall appearance    Nutrition Risk    Level of Risk: other (see comments) (1x/week)    Nutrition Follow-Up    RD Follow-up?: Yes

## 2017-12-08 LAB
ANION GAP SERPL CALC-SCNC: 11 MMOL/L
BASOPHILS # BLD AUTO: 0.02 K/UL
BASOPHILS NFR BLD: 0.4 %
BUN SERPL-MCNC: 12 MG/DL
CALCIUM SERPL-MCNC: 10.1 MG/DL
CHLORIDE SERPL-SCNC: 103 MMOL/L
CO2 SERPL-SCNC: 24 MMOL/L
CREAT SERPL-MCNC: 0.8 MG/DL
DIFFERENTIAL METHOD: ABNORMAL
EOSINOPHIL # BLD AUTO: 0.1 K/UL
EOSINOPHIL NFR BLD: 2.2 %
ERYTHROCYTE [DISTWIDTH] IN BLOOD BY AUTOMATED COUNT: 14.5 %
EST. GFR  (AFRICAN AMERICAN): >60 ML/MIN/1.73 M^2
EST. GFR  (NON AFRICAN AMERICAN): >60 ML/MIN/1.73 M^2
GLUCOSE SERPL-MCNC: 132 MG/DL
HCT VFR BLD AUTO: 33.9 %
HGB BLD-MCNC: 10.9 G/DL
IMM GRANULOCYTES # BLD AUTO: 0.02 K/UL
IMM GRANULOCYTES NFR BLD AUTO: 0.4 %
LYMPHOCYTES # BLD AUTO: 1.4 K/UL
LYMPHOCYTES NFR BLD: 32.1 %
MCH RBC QN AUTO: 26.9 PG
MCHC RBC AUTO-ENTMCNC: 32.2 G/DL
MCV RBC AUTO: 84 FL
MONOCYTES # BLD AUTO: 0.8 K/UL
MONOCYTES NFR BLD: 17.3 %
NEUTROPHILS # BLD AUTO: 2.1 K/UL
NEUTROPHILS NFR BLD: 47.6 %
NRBC BLD-RTO: 0 /100 WBC
PLATELET # BLD AUTO: 260 K/UL
PMV BLD AUTO: 10 FL
POTASSIUM SERPL-SCNC: 4.5 MMOL/L
RBC # BLD AUTO: 4.05 M/UL
SODIUM SERPL-SCNC: 138 MMOL/L
WBC # BLD AUTO: 4.46 K/UL

## 2017-12-08 PROCEDURE — 99231 SBSQ HOSP IP/OBS SF/LOW 25: CPT | Mod: ,,, | Performed by: HOSPITALIST

## 2017-12-08 PROCEDURE — 80048 BASIC METABOLIC PNL TOTAL CA: CPT

## 2017-12-08 PROCEDURE — 36415 COLL VENOUS BLD VENIPUNCTURE: CPT

## 2017-12-08 PROCEDURE — 25000003 PHARM REV CODE 250: Performed by: SURGERY

## 2017-12-08 PROCEDURE — 85025 COMPLETE CBC W/AUTO DIFF WBC: CPT

## 2017-12-08 PROCEDURE — 63600175 PHARM REV CODE 636 W HCPCS: Performed by: INTERNAL MEDICINE

## 2017-12-08 PROCEDURE — 25000003 PHARM REV CODE 250: Performed by: STUDENT IN AN ORGANIZED HEALTH CARE EDUCATION/TRAINING PROGRAM

## 2017-12-08 PROCEDURE — 20600001 HC STEP DOWN PRIVATE ROOM

## 2017-12-08 PROCEDURE — 25000003 PHARM REV CODE 250: Performed by: INTERNAL MEDICINE

## 2017-12-08 RX ADMIN — VANCOMYCIN HYDROCHLORIDE 1750 MG: 10 INJECTION, POWDER, LYOPHILIZED, FOR SOLUTION INTRAVENOUS at 07:12

## 2017-12-08 RX ADMIN — BACLOFEN 20 MG: 10 TABLET ORAL at 09:12

## 2017-12-08 RX ADMIN — CEFEPIME 2 G: 2 INJECTION, POWDER, FOR SOLUTION INTRAVENOUS at 04:12

## 2017-12-08 RX ADMIN — HYDROCODONE BITARTRATE AND ACETAMINOPHEN 1 TABLET: 10; 325 TABLET ORAL at 09:12

## 2017-12-08 RX ADMIN — NYSTATIN AND TRIAMCINOLONE ACETONIDE: 100000; 1 CREAM TOPICAL at 09:12

## 2017-12-08 RX ADMIN — HYDROCODONE BITARTRATE AND ACETAMINOPHEN 1 TABLET: 10; 325 TABLET ORAL at 03:12

## 2017-12-08 RX ADMIN — HYDROCODONE BITARTRATE AND ACETAMINOPHEN 1 TABLET: 10; 325 TABLET ORAL at 02:12

## 2017-12-08 RX ADMIN — VANCOMYCIN HYDROCHLORIDE 1750 MG: 10 INJECTION, POWDER, LYOPHILIZED, FOR SOLUTION INTRAVENOUS at 06:12

## 2017-12-08 RX ADMIN — LIDOCAINE 1 PATCH: 50 PATCH CUTANEOUS at 02:12

## 2017-12-08 RX ADMIN — STANDARDIZED SENNA CONCENTRATE AND DOCUSATE SODIUM 1 TABLET: 8.6; 5 TABLET, FILM COATED ORAL at 09:12

## 2017-12-08 RX ADMIN — GABAPENTIN 900 MG: 300 CAPSULE ORAL at 02:12

## 2017-12-08 RX ADMIN — BACLOFEN 20 MG: 10 TABLET ORAL at 02:12

## 2017-12-08 RX ADMIN — ENOXAPARIN SODIUM 40 MG: 100 INJECTION SUBCUTANEOUS at 04:12

## 2017-12-08 RX ADMIN — GABAPENTIN 900 MG: 300 CAPSULE ORAL at 09:12

## 2017-12-08 RX ADMIN — VANCOMYCIN HYDROCHLORIDE 1750 MG: 10 INJECTION, POWDER, LYOPHILIZED, FOR SOLUTION INTRAVENOUS at 02:12

## 2017-12-08 RX ADMIN — HYDROCODONE BITARTRATE AND ACETAMINOPHEN 1 TABLET: 10; 325 TABLET ORAL at 10:12

## 2017-12-08 RX ADMIN — BACLOFEN 20 MG: 10 TABLET ORAL at 06:12

## 2017-12-08 RX ADMIN — GABAPENTIN 900 MG: 300 CAPSULE ORAL at 06:12

## 2017-12-08 RX ADMIN — IBUPROFEN 600 MG: 200 TABLET, FILM COATED ORAL at 09:12

## 2017-12-08 RX ADMIN — HYDROCODONE BITARTRATE AND ACETAMINOPHEN 1 TABLET: 10; 325 TABLET ORAL at 06:12

## 2017-12-08 NOTE — PLAN OF CARE
Problem: Patient Care Overview  Goal: Plan of Care Review  Outcome: Ongoing (interventions implemented as appropriate)  Pt resting in bed, pain minimally controled with PRNs and scheduled muscle relaxers.  Pt using FWW and brace to ambluate ad-nae around unit and off floor to smoke.  Smoking cessation education continued.  IV antibiotics maintained.  No further needs assessed at this time.

## 2017-12-08 NOTE — PROGRESS NOTES
[facility] Hospital Medicine  Progress Note    Patient Name: Evgeny Wilde  MRN: [id number]  Patient Class: IP- Inpatient   Admission Date: 11/13/2017  Length of Stay: 25 days  Attending Physician: Sara Arauz MD  Primary Care Provider: [facility] Doctor [facility] Medicine Team: [facility] Mateo Carlson MD    Subjective:     Principal Problem:Discitis of lumbar region    HPI:  32 years old male with no significant past medical Hx, present to the ED with progressive lower back pain with sciatica for the last month. He works on constructions, pain start suddenly 1 month ago, at the lower back, dull, aggravated with movements. No inciting factors. Went to [facility] and [facility] ER, he had CT and MRI and they told him he had disk problem and he need neuro follow up and provided with muscle relaxant and narcotic for pain control. Patient stated the medicine is gone and the ain is worse with numbness in his left leg. He is not working for the last month. Denied any trauma, recent infection, surgery, foreign body, recent IV drug abuse. Also denied any fever, mekhi loss, fecal or urine incontinence, saddle anaesthesia.          Hospital Course:  11/14: NSGY following             Blood cultures NGTD, Urine cultures pending.              CT lumbar spine concerning for spondylodiscitis.  11/15: afebrile overnight, blood CX NGTD, NSGY stated no indications for NSGY interventions this admission, they want follow him up as outpatient after after Abx. We will pursue IR for disk Bx/aspirate for Dx.     11/16: NAEON. Vitals stable. Pending disc aspiration via IR tomorrow.  11/17: NAEON. Vitals stable. Planned for L4-L5 biopsy today.  11/18: NAEON. Vitals stable. S/p L4-L5 disc aspiration. Cultures, pathology report pending.  11/19: NAEON. Vitals stable.S/p L4-L5 disc aspiration. Cultures, pathology report pending.  11/20: NAEON. Vitals stable. Pathology showing acute and chronic OM. Cultures remain  negative.  11/21: NAEON. Vitals stable. Patient with trouble sleeping last night with pain in the buttocks and back as well as numbness in the toes. Pt still without adequate pain control.  11/22: NAEON. VSS. Patient with increased pain issues today with pain radiating from the back down to his feet.   11/22: NAEON. VSS. Patient with better pain control today.  11/23: NAEON. VSS. Patient with better pain control today.  11/24: NAEON. VSS. Patient asking for increased frequency of pain meds today.  11/27: NAEON. VSS.   11/28: NAEON. VSS. Patient with spasms still but no other complaints.  11/29: NAEON. VSS. Patient with spasms but no other complaints.  11/30: NAEON. VSS. Patient with spasms but no other complaints.  12/2: NAEON. VSS. Patient with continued pain and spasms.  12/3: NAEON. VSS. Patient with continued pain and spasms.  12/4:  Patient with lumbar back pain that was different than normal back pain.  Given 5 mg of oxycodone with some pain relief.  Remains afebrile with normal vitals. 12/5: Mr. Wilde reports difficulty sleeping secondary to muscle spasm.  Transitioned to baclofen yesterday with minimal to no improvement in symptoms.  Continues to be afebrile but has been intermittently tachycardic which coincides with increase in pain per patient  12/6  Back pain and muslce spasm improvied, patient able to sleep at night for the first time in 2 days, vanc level 19.9.  12/7:  Patient not in room during initial rounds this AM as he went outside and reports smoking a cigarette.  He continues to complain of muscle spasms that are improved with activity as well as skin irritation in the skin folds in the pelvic area.      Interval History: No acute events overnight.  Patient intermittently tachycardic.  Pain and spasms better controlled.  Skin irritation in pelvic region improved with cream.      Review of Systems   Constitutional: Negative for chills, fever and unexpected weight change.   HENT: Negative for  hearing loss.    Eyes: Negative for visual disturbance.   Respiratory: Negative for shortness of breath.    Cardiovascular: Negative for chest pain.   Gastrointestinal: Negative for abdominal pain, diarrhea, nausea and vomiting.   Genitourinary: Negative for dysuria.   Musculoskeletal: Positive for back pain. Negative for arthralgias.        Back spasms   Skin: Negative for rash.        Irritation in groin improving   Neurological: Negative for seizures and numbness.     Objective:     Vital Signs (Most Recent):  Temp: 97.8 °F (36.6 °C) (12/08/17 0802)  Pulse: 110 (12/08/17 0802)  Resp: 16 (12/08/17 0802)  BP: 125/70 (12/08/17 0802)  SpO2: 97 % (12/08/17 0802) Vital Signs (24h Range):  Temp:  [97.6 °F (36.4 °C)-98.8 °F (37.1 °C)] 97.8 °F (36.6 °C)  Pulse:  [] 110  Resp:  [16-18] 16  SpO2:  [97 %-99 %] 97 %  BP: (118-141)/(65-81) 125/70     Weight: 90.2 kg (198 lb 13.7 oz)  Body mass index is 25.53 kg/m².    Intake/Output Summary (Last 24 hours) at 12/08/17 0814  Last data filed at 12/07/17 1800   Gross per 24 hour   Intake              540 ml   Output                0 ml   Net              540 ml      Physical Exam   Constitutional: He is oriented to person, place, and time. He appears well-developed and well-nourished.   HENT:   Head: Normocephalic and atraumatic.   Eyes: EOM are normal. Pupils are equal, round, and reactive to light.   Neck: No tracheal deviation present. No thyromegaly present.   Cardiovascular: Normal rate.  Exam reveals no gallop and no friction rub.    No murmur heard.  Pulmonary/Chest: Effort normal and breath sounds normal. He has no wheezes. He has no rales.   Abdominal: There is no tenderness. No hernia.   Musculoskeletal: He exhibits tenderness (lumbar spine ). He exhibits no edema.   Neurological: He is alert and oriented to person, place, and time. No cranial nerve deficit or sensory deficit. He exhibits normal muscle tone.   Skin: Skin is warm. No erythema.   Psychiatric: He has  a normal mood and affect.       Significant Labs:   Recent Results (from the past 24 hour(s))   Basic Metabolic Panel (BMP)    Collection Time: 12/08/17  3:30 AM   Result Value Ref Range    Sodium 138 136 - 145 mmol/L    Potassium 4.5 3.5 - 5.1 mmol/L    Chloride 103 95 - 110 mmol/L    CO2 24 23 - 29 mmol/L    Glucose 132 (H) 70 - 110 mg/dL    BUN, Bld 12 6 - 20 mg/dL    Creatinine 0.8 0.5 - 1.4 mg/dL    Calcium 10.1 8.7 - 10.5 mg/dL    Anion Gap 11 8 - 16 mmol/L    eGFR if African American >60.0 >60 mL/min/1.73 m^2    eGFR if non African American >60.0 >60 mL/min/1.73 m^2   CBC auto differential    Collection Time: 12/08/17  3:30 AM   Result Value Ref Range    WBC 4.46 3.90 - 12.70 K/uL    RBC 4.05 (L) 4.60 - 6.20 M/uL    Hemoglobin 10.9 (L) 14.0 - 18.0 g/dL    Hematocrit 33.9 (L) 40.0 - 54.0 %    MCV 84 82 - 98 fL    MCH 26.9 (L) 27.0 - 31.0 pg    MCHC 32.2 32.0 - 36.0 g/dL    RDW 14.5 11.5 - 14.5 %    Platelets 260 150 - 350 K/uL    MPV 10.0 9.2 - 12.9 fL    Immature Granulocytes 0.4 0.0 - 0.5 %    Gran # 2.1 1.8 - 7.7 K/uL    Immature Grans (Abs) 0.02 0.00 - 0.04 K/uL    Lymph # 1.4 1.0 - 4.8 K/uL    Mono # 0.8 0.3 - 1.0 K/uL    Eos # 0.1 0.0 - 0.5 K/uL    Baso # 0.02 0.00 - 0.20 K/uL    nRBC 0 0 /100 WBC    Gran% 47.6 38.0 - 73.0 %    Lymph% 32.1 18.0 - 48.0 %    Mono% 17.3 (H) 4.0 - 15.0 %    Eosinophil% 2.2 0.0 - 8.0 %    Basophil% 0.4 0.0 - 1.9 %    Differential Method Automated          Significant Imaging new imagingNo new imaging    Assessment/Plan:      * Discitis of lumbar region    - Stable  - NSGY and ID following, appreciate assistance  - CT lumbar spine concerning for spondylodiscitis at L4-L5, now s/p aspiration with path showing acute and chronic OM, though blood and urine cultures remain negative to date.  - ESR, CRP mildly elevated earlier this admission.   - Procal normal  - HIV Abs negative  - PT/OT following  - Pain control  - Patient on ceftriaxone and vancomycin as ID's recommendation-  desire 6-8 weeks of tx- end date Jan 1st at earliest   - Will plan for follow up in NSGY clinic once ABX are done with MRI w/wo contrast & infectious labs.   - Vancomycin dose looking good based on troughs,  Last trough 19.9        Tobacco use    · Patient reports going outside to smoke  · Counseled patient on smoking cessation, but reports not interested in quitting at this time  · Will have nicotine patch available          Skin irritation    · Irritation in the skin folds of the pelvic area  · Continue with nystatin-triamcinolone cream          History of substance abuse    - Admission drug screen positive for opiates (prescribed to him as an outpatient), but also benzodiazepines, THC, and cocaine  - Not a candidate for PICC placement, so pursuing LTAC placement for long-term IV antibiotics          Acute bilateral low back pain with bilateral sciatica    - Stable  - Patient with discitis and acute on chronic osteomyelitis of lumbar region. Pain mostly unchanged from yesterday.  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, which complicates our discharge planning and pain control regimen  - Symptomatic treatment with current pain regiment- gabapentin 900mg TID, Ibprofen 600mg Q6, baclofen, lidocaine patch and PRN Norco 10mg-325mg for break-through pain  - Continue to monitor              VTE Risk Mitigation         Ordered     enoxaparin injection 40 mg  Daily     Route:  Subcutaneous        11/20/17 1617     Medium Risk of VTE  Once      11/13/17 1910        Dispo:  Pending LTAC placement      Mateo Carlson MD  Department of Hospital Medicine   [facility]

## 2017-12-08 NOTE — SUBJECTIVE & OBJECTIVE
Interval History: No acute events overnight.  Patient intermittently tachycardic.  Pain and spasms better controlled.  Skin irritation in pelvic region improved with cream.      Review of Systems   Constitutional: Negative for chills, fever and unexpected weight change.   HENT: Negative for hearing loss.    Eyes: Negative for visual disturbance.   Respiratory: Negative for shortness of breath.    Cardiovascular: Negative for chest pain.   Gastrointestinal: Negative for abdominal pain, diarrhea, nausea and vomiting.   Genitourinary: Negative for dysuria.   Musculoskeletal: Positive for back pain. Negative for arthralgias.        Back spasms   Skin: Negative for rash.        Irritation in groin improving   Neurological: Negative for seizures and numbness.     Objective:     Vital Signs (Most Recent):  Temp: 97.8 °F (36.6 °C) (12/08/17 0802)  Pulse: 110 (12/08/17 0802)  Resp: 16 (12/08/17 0802)  BP: 125/70 (12/08/17 0802)  SpO2: 97 % (12/08/17 0802) Vital Signs (24h Range):  Temp:  [97.6 °F (36.4 °C)-98.8 °F (37.1 °C)] 97.8 °F (36.6 °C)  Pulse:  [] 110  Resp:  [16-18] 16  SpO2:  [97 %-99 %] 97 %  BP: (118-141)/(65-81) 125/70     Weight: 90.2 kg (198 lb 13.7 oz)  Body mass index is 25.53 kg/m².    Intake/Output Summary (Last 24 hours) at 12/08/17 0814  Last data filed at 12/07/17 1800   Gross per 24 hour   Intake              540 ml   Output                0 ml   Net              540 ml      Physical Exam   Constitutional: He is oriented to person, place, and time. He appears well-developed and well-nourished.   HENT:   Head: Normocephalic and atraumatic.   Eyes: EOM are normal. Pupils are equal, round, and reactive to light.   Neck: No tracheal deviation present. No thyromegaly present.   Cardiovascular: Normal rate.  Exam reveals no gallop and no friction rub.    No murmur heard.  Pulmonary/Chest: Effort normal and breath sounds normal. He has no wheezes. He has no rales.   Abdominal: There is no tenderness. No  hernia.   Musculoskeletal: He exhibits tenderness (lumbar spine ). He exhibits no edema.   Neurological: He is alert and oriented to person, place, and time. No cranial nerve deficit or sensory deficit. He exhibits normal muscle tone.   Skin: Skin is warm. No erythema.   Psychiatric: He has a normal mood and affect.       Significant Labs:   Recent Results (from the past 24 hour(s))   Basic Metabolic Panel (BMP)    Collection Time: 12/08/17  3:30 AM   Result Value Ref Range    Sodium 138 136 - 145 mmol/L    Potassium 4.5 3.5 - 5.1 mmol/L    Chloride 103 95 - 110 mmol/L    CO2 24 23 - 29 mmol/L    Glucose 132 (H) 70 - 110 mg/dL    BUN, Bld 12 6 - 20 mg/dL    Creatinine 0.8 0.5 - 1.4 mg/dL    Calcium 10.1 8.7 - 10.5 mg/dL    Anion Gap 11 8 - 16 mmol/L    eGFR if African American >60.0 >60 mL/min/1.73 m^2    eGFR if non African American >60.0 >60 mL/min/1.73 m^2   CBC auto differential    Collection Time: 12/08/17  3:30 AM   Result Value Ref Range    WBC 4.46 3.90 - 12.70 K/uL    RBC 4.05 (L) 4.60 - 6.20 M/uL    Hemoglobin 10.9 (L) 14.0 - 18.0 g/dL    Hematocrit 33.9 (L) 40.0 - 54.0 %    MCV 84 82 - 98 fL    MCH 26.9 (L) 27.0 - 31.0 pg    MCHC 32.2 32.0 - 36.0 g/dL    RDW 14.5 11.5 - 14.5 %    Platelets 260 150 - 350 K/uL    MPV 10.0 9.2 - 12.9 fL    Immature Granulocytes 0.4 0.0 - 0.5 %    Gran # 2.1 1.8 - 7.7 K/uL    Immature Grans (Abs) 0.02 0.00 - 0.04 K/uL    Lymph # 1.4 1.0 - 4.8 K/uL    Mono # 0.8 0.3 - 1.0 K/uL    Eos # 0.1 0.0 - 0.5 K/uL    Baso # 0.02 0.00 - 0.20 K/uL    nRBC 0 0 /100 WBC    Gran% 47.6 38.0 - 73.0 %    Lymph% 32.1 18.0 - 48.0 %    Mono% 17.3 (H) 4.0 - 15.0 %    Eosinophil% 2.2 0.0 - 8.0 %    Basophil% 0.4 0.0 - 1.9 %    Differential Method Automated          Significant Imaging new imagingNo new imaging

## 2017-12-08 NOTE — PLAN OF CARE
Problem: Patient Care Overview  Goal: Plan of Care Review  Outcome: Ongoing (interventions implemented as appropriate)  Pt remained free from falls. Call light and personal items in reach. Pt waiting for a bed to open at LT[facility].

## 2017-12-08 NOTE — PLAN OF CARE
12/08/17 1240   Discharge Reassessment   Assessment Type Discharge Planning Reassessment   Provided patient/caregiver education on the expected discharge date and the discharge plan Yes   Do you have any problems affording any of your prescribed medications? TBD   Discharge Plan A Long-term acute care facility (LTAC)   Discharge Plan B Skilled Nursing Facility   Can the patient answer the patient profile reliably? Yes, cognitively intact   How does the patient rate their overall health at the present time? Fair   Describe the patient's ability to walk at the present time. Walks with the help of equipment   During the past month, has the patient often been bothered by feeling down, depressed or hopeless? No   During the past month, has the patient often been bothered by little interest or pleasure in doing things? No

## 2017-12-08 NOTE — ASSESSMENT & PLAN NOTE
- Stable  - Patient with discitis and acute on chronic osteomyelitis of lumbar region. Pain mostly unchanged from yesterday.  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, which complicates our discharge planning and pain control regimen  - Symptomatic treatment with current pain regiment- gabapentin 900mg TID, Ibprofen 600mg Q6, baclofen, lidocaine patch and PRN Norco 10mg-325mg for break-through pain  - Continue to monitor

## 2017-12-09 PROCEDURE — 63600175 PHARM REV CODE 636 W HCPCS: Performed by: INTERNAL MEDICINE

## 2017-12-09 PROCEDURE — 25000003 PHARM REV CODE 250: Performed by: INTERNAL MEDICINE

## 2017-12-09 PROCEDURE — 25000003 PHARM REV CODE 250: Performed by: STUDENT IN AN ORGANIZED HEALTH CARE EDUCATION/TRAINING PROGRAM

## 2017-12-09 PROCEDURE — 99231 SBSQ HOSP IP/OBS SF/LOW 25: CPT | Mod: ,,, | Performed by: HOSPITALIST

## 2017-12-09 PROCEDURE — 20600001 HC STEP DOWN PRIVATE ROOM

## 2017-12-09 PROCEDURE — 25000003 PHARM REV CODE 250: Performed by: SURGERY

## 2017-12-09 RX ADMIN — BACLOFEN 20 MG: 10 TABLET ORAL at 09:12

## 2017-12-09 RX ADMIN — BACLOFEN 20 MG: 10 TABLET ORAL at 02:12

## 2017-12-09 RX ADMIN — GABAPENTIN 900 MG: 300 CAPSULE ORAL at 06:12

## 2017-12-09 RX ADMIN — STANDARDIZED SENNA CONCENTRATE AND DOCUSATE SODIUM 1 TABLET: 8.6; 5 TABLET, FILM COATED ORAL at 08:12

## 2017-12-09 RX ADMIN — ENOXAPARIN SODIUM 40 MG: 100 INJECTION SUBCUTANEOUS at 04:12

## 2017-12-09 RX ADMIN — VANCOMYCIN HYDROCHLORIDE 1750 MG: 10 INJECTION, POWDER, LYOPHILIZED, FOR SOLUTION INTRAVENOUS at 08:12

## 2017-12-09 RX ADMIN — GABAPENTIN 900 MG: 300 CAPSULE ORAL at 10:12

## 2017-12-09 RX ADMIN — IBUPROFEN 600 MG: 200 TABLET, FILM COATED ORAL at 08:12

## 2017-12-09 RX ADMIN — HYDROCODONE BITARTRATE AND ACETAMINOPHEN 1 TABLET: 10; 325 TABLET ORAL at 06:12

## 2017-12-09 RX ADMIN — HYDROCODONE BITARTRATE AND ACETAMINOPHEN 1 TABLET: 10; 325 TABLET ORAL at 11:12

## 2017-12-09 RX ADMIN — VANCOMYCIN HYDROCHLORIDE 1750 MG: 10 INJECTION, POWDER, LYOPHILIZED, FOR SOLUTION INTRAVENOUS at 11:12

## 2017-12-09 RX ADMIN — BACLOFEN 20 MG: 10 TABLET ORAL at 06:12

## 2017-12-09 RX ADMIN — HYDROCODONE BITARTRATE AND ACETAMINOPHEN 1 TABLET: 10; 325 TABLET ORAL at 10:12

## 2017-12-09 RX ADMIN — GABAPENTIN 900 MG: 300 CAPSULE ORAL at 02:12

## 2017-12-09 RX ADMIN — NYSTATIN AND TRIAMCINOLONE ACETONIDE: 100000; 1 CREAM TOPICAL at 10:12

## 2017-12-09 RX ADMIN — VANCOMYCIN HYDROCHLORIDE 1750 MG: 10 INJECTION, POWDER, LYOPHILIZED, FOR SOLUTION INTRAVENOUS at 01:12

## 2017-12-09 RX ADMIN — NYSTATIN AND TRIAMCINOLONE ACETONIDE: 100000; 1 CREAM TOPICAL at 11:12

## 2017-12-09 RX ADMIN — CEFEPIME 2 G: 2 INJECTION, POWDER, FOR SOLUTION INTRAVENOUS at 04:12

## 2017-12-09 RX ADMIN — VANCOMYCIN HYDROCHLORIDE 1750 MG: 10 INJECTION, POWDER, LYOPHILIZED, FOR SOLUTION INTRAVENOUS at 05:12

## 2017-12-09 NOTE — PLAN OF CARE
Problem: Pain, Acute (Adult)  Intervention: Monitor/Manage Analgesia  Pt had uneventful night. C/o pain. PRN med and warm packs given. Pain level decreased. Ambulated in hallway several times. Rested well once asleep. Bed in locked position, call light in reach. Will continue to monitor.

## 2017-12-09 NOTE — NURSING
"IV vanco running, pt called RN to come see his Iv, states "i tried to hold it still but it's still leaking."  RN went to assess, IV dressing found peeled back from one corner and half out of the vein , bent. IV was in an area of the arm far from any joints, hence unlikely to become bent and partially undressed due to "not holding still".  Rn had previously redressed this IV in pt's mid forearm prior to starting his antibiotic approximately an hour earlier because he complained that it had "a bump on it" and needed changed.  No s/s of infiltration or phlebitis were noted at that time.  RN started a new IV and the pt fell asleep, snoring, while IV was started.    RN paged Mds, updated team on RN's concern regarding pt leaving floor frequently, given pt's history and this behavior after returning from going outside.   "

## 2017-12-09 NOTE — SUBJECTIVE & OBJECTIVE
Interval History: Patient off the floor this AM to go outside to smoke.  Patients nurse concern that patient returned sedated.  He continues to complain of muscle spasms.      Review of Systems   Constitutional: Negative for chills, fever and unexpected weight change.   HENT: Negative for hearing loss.    Eyes: Negative for visual disturbance.   Respiratory: Negative for shortness of breath.    Cardiovascular: Negative for chest pain.   Gastrointestinal: Negative for abdominal pain, diarrhea, nausea and vomiting.   Endocrine: Positive for polyphagia.   Genitourinary: Negative for dysuria.   Musculoskeletal: Positive for back pain. Negative for arthralgias.        Back spasms   Skin: Negative for rash.   Neurological: Negative for seizures and numbness.     Objective:     Vital Signs (Most Recent):  Temp: 97.3 °F (36.3 °C) (12/09/17 1156)  Pulse: 83 (12/09/17 1156)  Resp: 17 (12/09/17 1156)  BP: 124/73 (12/09/17 1156)  SpO2: 99 % (12/09/17 1156) Vital Signs (24h Range):  Temp:  [97.3 °F (36.3 °C)-98.8 °F (37.1 °C)] 97.3 °F (36.3 °C)  Pulse:  [] 83  Resp:  [16-18] 17  SpO2:  [96 %-100 %] 99 %  BP: (109-160)/(69-80) 124/73     Weight: 90.2 kg (198 lb 13.7 oz)  Body mass index is 25.53 kg/m².    Intake/Output Summary (Last 24 hours) at 12/09/17 1326  Last data filed at 12/09/17 0635   Gross per 24 hour   Intake              980 ml   Output             1675 ml   Net             -695 ml      Physical Exam   Constitutional: He is oriented to person, place, and time. He appears well-developed and well-nourished.   HENT:   Head: Normocephalic and atraumatic.   Eyes: EOM are normal. Pupils are equal, round, and reactive to light.   Neck: No tracheal deviation present. No thyromegaly present.   Cardiovascular: Normal rate.  Exam reveals no gallop and no friction rub.    No murmur heard.  Pulmonary/Chest: Effort normal and breath sounds normal. He has no wheezes. He has no rales.   Abdominal: There is no tenderness. No  hernia.   Musculoskeletal: He exhibits tenderness (lumbar spine ). He exhibits no edema.   Neurological: He is alert and oriented to person, place, and time. No cranial nerve deficit or sensory deficit. He exhibits normal muscle tone.   Skin: Skin is warm. No erythema.   Psychiatric: He has a normal mood and affect.       Significant Labs: None    Significant Imaging: No new imaging

## 2017-12-09 NOTE — PLAN OF CARE
Problem: Patient Care Overview  Goal: Plan of Care Review  Outcome: Ongoing (interventions implemented as appropriate)  Pt up ad nae in room, ambulates with FWW and brace, Ivantibiotics continued, PRN pain medication per MAR.  No further needs assessed.

## 2017-12-09 NOTE — PROGRESS NOTES
[facility] Hospital Medicine  Progress Note    Patient Name: Evgeny Wilde  MRN: [id number]  Patient Class: IP- Inpatient   Admission Date: 11/13/2017  Length of Stay: 26 days  Attending Physician: Sara Arauz MD  Primary Care Provider: Primary Doctor [facility] Medicine Team: [facility] 2 Mateo Carlson MD    Subjective:     Principal Problem:Discitis of lumbar region    HPI:  32 years old male with no significant past medical Hx, present to the ED with progressive lower back pain with sciatica for the last month. He works on constructions, pain start suddenly 1 month ago, at the lower back, dull, aggravated with movements. No inciting factors. Went to [facility] and [facility] ER, he had CT and MRI and they told him he had disk problem and he need neuro follow up and provided with muscle relaxant and narcotic for pain control. Patient stated the medicine is gone and the ain is worse with numbness in his left leg. He is not working for the last month. Denied any trauma, recent infection, surgery, foreign body, recent IV drug abuse. Also denied any fever, mekhi loss, fecal or urine incontinence, saddle anaesthesia.          Hospital Course:  11/14: NSGY following             Blood cultures NGTD, Urine cultures pending.              CT lumbar spine concerning for spondylodiscitis.  11/15: afebrile overnight, blood CX NGTD, NSGY stated no indications for NSGY interventions this admission, they want follow him up as outpatient after after Abx. We will pursue IR for disk Bx/aspirate for Dx.     11/16: NAEON. Vitals stable. Pending disc aspiration via IR tomorrow.  11/17: NAEON. Vitals stable. Planned for L4-L5 biopsy today.  11/18: NAEON. Vitals stable. S/p L4-L5 disc aspiration. Cultures, pathology report pending.  11/19: NAEON. Vitals stable.S/p L4-L5 disc aspiration. Cultures, pathology report pending.  11/20: NAEON. Vitals stable. Pathology showing acute and chronic OM. Cultures remain  negative.  11/21: NAEON. Vitals stable. Patient with trouble sleeping last night with pain in the buttocks and back as well as numbness in the toes. Pt still without adequate pain control.  11/22: NAEON. VSS. Patient with increased pain issues today with pain radiating from the back down to his feet.   11/22: NAEON. VSS. Patient with better pain control today.  11/23: NAEON. VSS. Patient with better pain control today.  11/24: NAEON. VSS. Patient asking for increased frequency of pain meds today.  11/27: NAEON. VSS.   11/28: NAEON. VSS. Patient with spasms still but no other complaints.  11/29: NAEON. VSS. Patient with spasms but no other complaints.  11/30: NAEON. VSS. Patient with spasms but no other complaints.  12/2: NAEON. VSS. Patient with continued pain and spasms.  12/3: NAEON. VSS. Patient with continued pain and spasms.  12/4:  Patient with lumbar back pain that was different than normal back pain.  Given 5 mg of oxycodone with some pain relief.  Remains afebrile with normal vitals. 12/5: Mr. Wilde reports difficulty sleeping secondary to muscle spasm.  Transitioned to baclofen yesterday with minimal to no improvement in symptoms.  Continues to be afebrile but has been intermittently tachycardic which coincides with increase in pain per patient  12/6  Back pain and muslce spasm improvied, patient able to sleep at night for the first time in 2 days, vanc level 19.9.  12/7:  Patient not in room during initial rounds this AM as he went outside and reports smoking a cigarette.  He continues to complain of muscle spasms that are improved with activity as well as skin irritation in the skin folds in the pelvic area.    12/8:  No acute events overnight.  Patient intermittently tachycardic.  Pain and spasms better controlled.  Skin irritation in pelvic region improved with cream.  No acute events overnight.  Patient intermittently tachycardic.  Pain and spasms better controlled.  Skin irritation in pelvic region  improved with cream.        Interval History: Patient off the floor this AM to go outside to smoke.  Patients nurse concern that patient returned sedated.  He continues to complain of muscle spasms.      Review of Systems   Constitutional: Negative for chills, fever and unexpected weight change.   HENT: Negative for hearing loss.    Eyes: Negative for visual disturbance.   Respiratory: Negative for shortness of breath.    Cardiovascular: Negative for chest pain.   Gastrointestinal: Negative for abdominal pain, diarrhea, nausea and vomiting.   Endocrine: Positive for polyphagia.   Genitourinary: Negative for dysuria.   Musculoskeletal: Positive for back pain. Negative for arthralgias.        Back spasms   Skin: Negative for rash.   Neurological: Negative for seizures and numbness.     Objective:     Vital Signs (Most Recent):  Temp: 97.3 °F (36.3 °C) (12/09/17 1156)  Pulse: 83 (12/09/17 1156)  Resp: 17 (12/09/17 1156)  BP: 124/73 (12/09/17 1156)  SpO2: 99 % (12/09/17 1156) Vital Signs (24h Range):  Temp:  [97.3 °F (36.3 °C)-98.8 °F (37.1 °C)] 97.3 °F (36.3 °C)  Pulse:  [] 83  Resp:  [16-18] 17  SpO2:  [96 %-100 %] 99 %  BP: (109-160)/(69-80) 124/73     Weight: 90.2 kg (198 lb 13.7 oz)  Body mass index is 25.53 kg/m².    Intake/Output Summary (Last 24 hours) at 12/09/17 1326  Last data filed at 12/09/17 0635   Gross per 24 hour   Intake              980 ml   Output             1675 ml   Net             -695 ml      Physical Exam   Constitutional: He is oriented to person, place, and time. He appears well-developed and well-nourished.   HENT:   Head: Normocephalic and atraumatic.   Eyes: EOM are normal. Pupils are equal, round, and reactive to light.   Neck: No tracheal deviation present. No thyromegaly present.   Cardiovascular: Normal rate.  Exam reveals no gallop and no friction rub.    No murmur heard.  Pulmonary/Chest: Effort normal and breath sounds normal. He has no wheezes. He has no rales.   Abdominal:  There is no tenderness. No hernia.   Musculoskeletal: He exhibits tenderness (lumbar spine ). He exhibits no edema.   Neurological: He is alert and oriented to person, place, and time. No cranial nerve deficit or sensory deficit. He exhibits normal muscle tone.   Skin: Skin is warm. No erythema.   Psychiatric: He has a normal mood and affect.       Significant Labs: None    Significant Imaging: No new imaging    Assessment/Plan:      * Discitis of lumbar region    - Stable  - NSGY and ID following, appreciate assistance  - CT lumbar spine concerning for spondylodiscitis at L4-L5, now s/p aspiration with path showing acute and chronic OM, though blood and urine cultures remain negative to date.  - ESR, CRP mildly elevated earlier this admission.   - Procal normal  - HIV Abs negative  - PT/OT following  - Pain control  - Patient on ceftriaxone and vancomycin as ID's recommendation- desire 6-8 weeks of tx- end date Jan 1st at earliest   - Will plan for follow up in NSGY clinic once ABX are done with MRI w/wo contrast & infectious labs.   - Vancomycin dose looking good based on troughs,  Last trough 19.9        Tobacco use    · Patient reports going outside to smoke  · Counseled patient on smoking cessation, but reports not interested in quitting at this time  · Will have nicotine patch available          Skin irritation    · Irritation in the skin folds of the pelvic area continue to improve  · Continue with nystatin-triamcinolone cream          History of substance abuse    - Admission drug screen positive for opiates (prescribed to him as an outpatient), but also benzodiazepines, THC, and cocaine  - Not a candidate for PICC placement, so pursuing LTAC placement for long-term IV antibiotics          Acute bilateral low back pain with bilateral sciatica    - Stable  - Patient with discitis and acute on chronic osteomyelitis of lumbar region. Pain mostly unchanged from yesterday.  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, which  complicates our discharge planning and pain control regimen  - Symptomatic treatment with current pain regiment- gabapentin 900mg TID, Ibprofen 600mg Q6, baclofen, lidocaine patch and PRN Norco 10mg-325mg for break-through pain  - Continue to monitor              VTE Risk Mitigation         Ordered     enoxaparin injection 40 mg  Daily     Route:  Subcutaneous        11/20/17 1617     Medium Risk of VTE  Once      11/13/17 1910              Mateo Carlson MD  Department of Hospital Medicine   [facility]

## 2017-12-09 NOTE — ASSESSMENT & PLAN NOTE
· Irritation in the skin folds of the pelvic area continue to improve  · Continue with nystatin-triamcinolone cream

## 2017-12-10 PROBLEM — M86.9 OSTEOMYELITIS: Status: ACTIVE | Noted: 2017-12-10

## 2017-12-10 PROBLEM — R23.8 SKIN IRRITATION: Status: RESOLVED | Noted: 2017-12-07 | Resolved: 2017-12-10

## 2017-12-10 LAB
ANION GAP SERPL CALC-SCNC: 12 MMOL/L
BASOPHILS # BLD AUTO: 0.02 K/UL
BASOPHILS NFR BLD: 0.5 %
BUN SERPL-MCNC: 14 MG/DL
CALCIUM SERPL-MCNC: 9.8 MG/DL
CHLORIDE SERPL-SCNC: 102 MMOL/L
CO2 SERPL-SCNC: 24 MMOL/L
CREAT SERPL-MCNC: 0.8 MG/DL
DIFFERENTIAL METHOD: ABNORMAL
EOSINOPHIL # BLD AUTO: 0.1 K/UL
EOSINOPHIL NFR BLD: 2.8 %
ERYTHROCYTE [DISTWIDTH] IN BLOOD BY AUTOMATED COUNT: 14.6 %
EST. GFR  (AFRICAN AMERICAN): >60 ML/MIN/1.73 M^2
EST. GFR  (NON AFRICAN AMERICAN): >60 ML/MIN/1.73 M^2
GLUCOSE SERPL-MCNC: 94 MG/DL
HCT VFR BLD AUTO: 34.8 %
HGB BLD-MCNC: 11.1 G/DL
IMM GRANULOCYTES # BLD AUTO: 0.02 K/UL
IMM GRANULOCYTES NFR BLD AUTO: 0.5 %
LYMPHOCYTES # BLD AUTO: 1.3 K/UL
LYMPHOCYTES NFR BLD: 32.7 %
MCH RBC QN AUTO: 26.4 PG
MCHC RBC AUTO-ENTMCNC: 31.9 G/DL
MCV RBC AUTO: 83 FL
MONOCYTES # BLD AUTO: 0.8 K/UL
MONOCYTES NFR BLD: 20.9 %
NEUTROPHILS # BLD AUTO: 1.7 K/UL
NEUTROPHILS NFR BLD: 42.6 %
NRBC BLD-RTO: 0 /100 WBC
PLATELET # BLD AUTO: 274 K/UL
PMV BLD AUTO: 10.1 FL
POTASSIUM SERPL-SCNC: 4.4 MMOL/L
RBC # BLD AUTO: 4.21 M/UL
SODIUM SERPL-SCNC: 138 MMOL/L
WBC # BLD AUTO: 3.98 K/UL

## 2017-12-10 PROCEDURE — 80048 BASIC METABOLIC PNL TOTAL CA: CPT

## 2017-12-10 PROCEDURE — 25000003 PHARM REV CODE 250: Performed by: STUDENT IN AN ORGANIZED HEALTH CARE EDUCATION/TRAINING PROGRAM

## 2017-12-10 PROCEDURE — 63600175 PHARM REV CODE 636 W HCPCS: Performed by: INTERNAL MEDICINE

## 2017-12-10 PROCEDURE — 25000003 PHARM REV CODE 250: Performed by: INTERNAL MEDICINE

## 2017-12-10 PROCEDURE — 36415 COLL VENOUS BLD VENIPUNCTURE: CPT

## 2017-12-10 PROCEDURE — 99231 SBSQ HOSP IP/OBS SF/LOW 25: CPT | Mod: ,,, | Performed by: HOSPITALIST

## 2017-12-10 PROCEDURE — 85025 COMPLETE CBC W/AUTO DIFF WBC: CPT

## 2017-12-10 PROCEDURE — 20600001 HC STEP DOWN PRIVATE ROOM

## 2017-12-10 PROCEDURE — 25000003 PHARM REV CODE 250: Performed by: SURGERY

## 2017-12-10 RX ADMIN — STANDARDIZED SENNA CONCENTRATE AND DOCUSATE SODIUM 1 TABLET: 8.6; 5 TABLET, FILM COATED ORAL at 09:12

## 2017-12-10 RX ADMIN — HYDROCODONE BITARTRATE AND ACETAMINOPHEN 1 TABLET: 10; 325 TABLET ORAL at 04:12

## 2017-12-10 RX ADMIN — IBUPROFEN 600 MG: 200 TABLET, FILM COATED ORAL at 09:12

## 2017-12-10 RX ADMIN — BACLOFEN 20 MG: 10 TABLET ORAL at 06:12

## 2017-12-10 RX ADMIN — GABAPENTIN 900 MG: 300 CAPSULE ORAL at 09:12

## 2017-12-10 RX ADMIN — BACLOFEN 20 MG: 10 TABLET ORAL at 02:12

## 2017-12-10 RX ADMIN — BACLOFEN 20 MG: 10 TABLET ORAL at 09:12

## 2017-12-10 RX ADMIN — VANCOMYCIN HYDROCHLORIDE 1750 MG: 10 INJECTION, POWDER, LYOPHILIZED, FOR SOLUTION INTRAVENOUS at 05:12

## 2017-12-10 RX ADMIN — HYDROCODONE BITARTRATE AND ACETAMINOPHEN 1 TABLET: 10; 325 TABLET ORAL at 11:12

## 2017-12-10 RX ADMIN — GABAPENTIN 900 MG: 300 CAPSULE ORAL at 06:12

## 2017-12-10 RX ADMIN — VANCOMYCIN HYDROCHLORIDE 1750 MG: 10 INJECTION, POWDER, LYOPHILIZED, FOR SOLUTION INTRAVENOUS at 07:12

## 2017-12-10 RX ADMIN — IBUPROFEN 600 MG: 200 TABLET, FILM COATED ORAL at 02:12

## 2017-12-10 RX ADMIN — IBUPROFEN 600 MG: 200 TABLET, FILM COATED ORAL at 07:12

## 2017-12-10 RX ADMIN — GABAPENTIN 900 MG: 300 CAPSULE ORAL at 02:12

## 2017-12-10 RX ADMIN — CEFEPIME 2 G: 2 INJECTION, POWDER, FOR SOLUTION INTRAVENOUS at 05:12

## 2017-12-10 RX ADMIN — STANDARDIZED SENNA CONCENTRATE AND DOCUSATE SODIUM 1 TABLET: 8.6; 5 TABLET, FILM COATED ORAL at 11:12

## 2017-12-10 RX ADMIN — ENOXAPARIN SODIUM 40 MG: 100 INJECTION SUBCUTANEOUS at 05:12

## 2017-12-10 NOTE — SUBJECTIVE & OBJECTIVE
Interval History: No acute events overnight.  Continued spasms while in bed otherwise no complaints    Review of Systems   Constitutional: Negative for chills, fever and unexpected weight change.   HENT: Negative for hearing loss.    Eyes: Negative for visual disturbance.   Respiratory: Negative for shortness of breath.    Cardiovascular: Negative for chest pain.   Gastrointestinal: Negative for abdominal pain, diarrhea, nausea and vomiting.   Genitourinary: Negative for dysuria.   Musculoskeletal: Positive for back pain. Negative for arthralgias.        Back spasms   Skin: Negative for rash.   Neurological: Negative for seizures and numbness.     Objective:     Vital Signs (Most Recent):  Temp: 99.6 °F (37.6 °C) (12/10/17 1234)  Pulse: 107 (12/10/17 1234)  Resp: 17 (12/10/17 1234)  BP: 130/77 (12/10/17 1234)  SpO2: 98 % (12/10/17 1234) Vital Signs (24h Range):  Temp:  [97.6 °F (36.4 °C)-99.6 °F (37.6 °C)] 99.6 °F (37.6 °C)  Pulse:  [] 107  Resp:  [14-18] 17  SpO2:  [96 %-98 %] 98 %  BP: (122-131)/(58-81) 130/77     Weight: 90.2 kg (198 lb 13.7 oz)  Body mass index is 25.53 kg/m².    Intake/Output Summary (Last 24 hours) at 12/10/17 1239  Last data filed at 12/10/17 1025   Gross per 24 hour   Intake             2240 ml   Output             1475 ml   Net              765 ml      Physical Exam   Constitutional: He is oriented to person, place, and time. He appears well-developed and well-nourished.   HENT:   Head: Normocephalic and atraumatic.   Eyes: EOM are normal. Pupils are equal, round, and reactive to light.   Neck: No tracheal deviation present. No thyromegaly present.   Cardiovascular: Normal rate.  Exam reveals no gallop and no friction rub.    No murmur heard.  Pulmonary/Chest: Effort normal and breath sounds normal. He has no wheezes. He has no rales.   Abdominal: There is no tenderness. No hernia.   Musculoskeletal: He exhibits tenderness (lumbar spine ). He exhibits no edema.   Neurological: He is  alert and oriented to person, place, and time. No cranial nerve deficit or sensory deficit. He exhibits normal muscle tone.   Skin: Skin is warm. No erythema.   Psychiatric: He has a normal mood and affect.       Significant Labs:   Recent Results (from the past 24 hour(s))   Basic Metabolic Panel (BMP)    Collection Time: 12/10/17  6:17 AM   Result Value Ref Range    Sodium 138 136 - 145 mmol/L    Potassium 4.4 3.5 - 5.1 mmol/L    Chloride 102 95 - 110 mmol/L    CO2 24 23 - 29 mmol/L    Glucose 94 70 - 110 mg/dL    BUN, Bld 14 6 - 20 mg/dL    Creatinine 0.8 0.5 - 1.4 mg/dL    Calcium 9.8 8.7 - 10.5 mg/dL    Anion Gap 12 8 - 16 mmol/L    eGFR if African American >60.0 >60 mL/min/1.73 m^2    eGFR if non African American >60.0 >60 mL/min/1.73 m^2   CBC auto differential    Collection Time: 12/10/17  6:17 AM   Result Value Ref Range    WBC 3.98 3.90 - 12.70 K/uL    RBC 4.21 (L) 4.60 - 6.20 M/uL    Hemoglobin 11.1 (L) 14.0 - 18.0 g/dL    Hematocrit 34.8 (L) 40.0 - 54.0 %    MCV 83 82 - 98 fL    MCH 26.4 (L) 27.0 - 31.0 pg    MCHC 31.9 (L) 32.0 - 36.0 g/dL    RDW 14.6 (H) 11.5 - 14.5 %    Platelets 274 150 - 350 K/uL    MPV 10.1 9.2 - 12.9 fL    Immature Granulocytes 0.5 0.0 - 0.5 %    Gran # 1.7 (L) 1.8 - 7.7 K/uL    Immature Grans (Abs) 0.02 0.00 - 0.04 K/uL    Lymph # 1.3 1.0 - 4.8 K/uL    Mono # 0.8 0.3 - 1.0 K/uL    Eos # 0.1 0.0 - 0.5 K/uL    Baso # 0.02 0.00 - 0.20 K/uL    nRBC 0 0 /100 WBC    Gran% 42.6 38.0 - 73.0 %    Lymph% 32.7 18.0 - 48.0 %    Mono% 20.9 (H) 4.0 - 15.0 %    Eosinophil% 2.8 0.0 - 8.0 %    Basophil% 0.5 0.0 - 1.9 %    Differential Method Automated          Significant Imaging: No new imaging

## 2017-12-10 NOTE — PROGRESS NOTES
[facility] Hospital Medicine  Progress Note    Patient Name: Evgeny Wilde  MRN: [id number]  Patient Class: IP- Inpatient   Admission Date: 11/13/2017  Length of Stay: 27 days  Attending Physician: Sara Arauz MD  Primary Care Provider: Primary Doctor [facility] Medicine Team: [facility] Mateo Carlson MD    Subjective:     Principal Problem:Discitis of lumbar region    HPI:  32 years old male with no significant past medical Hx, present to the ED with progressive lower back pain with sciatica for the last month. He works on constructions, pain start suddenly 1 month ago, at the lower back, dull, aggravated with movements. No inciting factors. Went to [facility] and [facility] ER, he had CT and MRI and they told him he had disk problem and he need neuro follow up and provided with muscle relaxant and narcotic for pain control. Patient stated the medicine is gone and the ain is worse with numbness in his left leg. He is not working for the last month. Denied any trauma, recent infection, surgery, foreign body, recent IV drug abuse. Also denied any fever, mekhi loss, fecal or urine incontinence, saddle anaesthesia.          Hospital Course:  11/14: NSGY following             Blood cultures NGTD, Urine cultures pending.              CT lumbar spine concerning for spondylodiscitis.  11/15: afebrile overnight, blood CX NGTD, NSGY stated no indications for NSGY interventions this admission, they want follow him up as outpatient after after Abx. We will pursue IR for disk Bx/aspirate for Dx.     11/16: NAEON. Vitals stable. Pending disc aspiration via IR tomorrow.  11/17: NAEON. Vitals stable. Planned for L4-L5 biopsy today.  11/18: NAEON. Vitals stable. S/p L4-L5 disc aspiration. Cultures, pathology report pending.  11/19: NAEON. Vitals stable.S/p L4-L5 disc aspiration. Cultures, pathology report pending.  11/20: NAEON. Vitals stable. Pathology showing acute and chronic OM. Cultures remain  negative.  11/21: NAEON. Vitals stable. Patient with trouble sleeping last night with pain in the buttocks and back as well as numbness in the toes. Pt still without adequate pain control.  11/22: NAEON. VSS. Patient with increased pain issues today with pain radiating from the back down to his feet.   11/22: NAEON. VSS. Patient with better pain control today.  11/23: NAEON. VSS. Patient with better pain control today.  11/24: NAEON. VSS. Patient asking for increased frequency of pain meds today.  11/27: NAEON. VSS.   11/28: NAEON. VSS. Patient with spasms still but no other complaints.  11/29: NAEON. VSS. Patient with spasms but no other complaints.  11/30: NAEON. VSS. Patient with spasms but no other complaints.  12/2: NAEON. VSS. Patient with continued pain and spasms.  12/3: NAEON. VSS. Patient with continued pain and spasms.  12/4:  Patient with lumbar back pain that was different than normal back pain.  Given 5 mg of oxycodone with some pain relief.  Remains afebrile with normal vitals. 12/5: Mr. Wilde reports difficulty sleeping secondary to muscle spasm.  Transitioned to baclofen yesterday with minimal to no improvement in symptoms.  Continues to be afebrile but has been intermittently tachycardic which coincides with increase in pain per patient  12/6  Back pain and muslce spasm improvied, patient able to sleep at night for the first time in 2 days, vanc level 19.9.  12/7:  Patient not in room during initial rounds this AM as he went outside and reports smoking a cigarette.  He continues to complain of muscle spasms that are improved with activity as well as skin irritation in the skin folds in the pelvic area.    12/8:  No acute events overnight.  Patient intermittently tachycardic.  Pain and spasms better controlled.  Skin irritation in pelvic region improved with cream.  No acute events overnight.  Patient intermittently tachycardic.  Pain and spasms better controlled.  Skin irritation in pelvic region  improved with cream.    12/9:  Patient off the floor this AM to go outside to smoke.  Patients nurse concern that patient returned sedated.  He continues to complain of muscle spasms.      Interval History: No acute events overnight.  Continued spasms while in bed otherwise no complaints    Review of Systems   Constitutional: Negative for chills, fever and unexpected weight change.   HENT: Negative for hearing loss.    Eyes: Negative for visual disturbance.   Respiratory: Negative for shortness of breath.    Cardiovascular: Negative for chest pain.   Gastrointestinal: Negative for abdominal pain, diarrhea, nausea and vomiting.   Genitourinary: Negative for dysuria.   Musculoskeletal: Positive for back pain. Negative for arthralgias.        Back spasms   Skin: Negative for rash.   Neurological: Negative for seizures and numbness.     Objective:     Vital Signs (Most Recent):  Temp: 99.6 °F (37.6 °C) (12/10/17 1234)  Pulse: 107 (12/10/17 1234)  Resp: 17 (12/10/17 1234)  BP: 130/77 (12/10/17 1234)  SpO2: 98 % (12/10/17 1234) Vital Signs (24h Range):  Temp:  [97.6 °F (36.4 °C)-99.6 °F (37.6 °C)] 99.6 °F (37.6 °C)  Pulse:  [] 107  Resp:  [14-18] 17  SpO2:  [96 %-98 %] 98 %  BP: (122-131)/(58-81) 130/77     Weight: 90.2 kg (198 lb 13.7 oz)  Body mass index is 25.53 kg/m².    Intake/Output Summary (Last 24 hours) at 12/10/17 1239  Last data filed at 12/10/17 1025   Gross per 24 hour   Intake             2240 ml   Output             1475 ml   Net              765 ml      Physical Exam   Constitutional: He is oriented to person, place, and time. He appears well-developed and well-nourished.   HENT:   Head: Normocephalic and atraumatic.   Eyes: EOM are normal. Pupils are equal, round, and reactive to light.   Neck: No tracheal deviation present. No thyromegaly present.   Cardiovascular: Normal rate.  Exam reveals no gallop and no friction rub.    No murmur heard.  Pulmonary/Chest: Effort normal and breath sounds normal.  He has no wheezes. He has no rales.   Abdominal: There is no tenderness. No hernia.   Musculoskeletal: He exhibits tenderness (lumbar spine ). He exhibits no edema.   Neurological: He is alert and oriented to person, place, and time. No cranial nerve deficit or sensory deficit. He exhibits normal muscle tone.   Skin: Skin is warm. No erythema.   Psychiatric: He has a normal mood and affect.       Significant Labs:   Recent Results (from the past 24 hour(s))   Basic Metabolic Panel (BMP)    Collection Time: 12/10/17  6:17 AM   Result Value Ref Range    Sodium 138 136 - 145 mmol/L    Potassium 4.4 3.5 - 5.1 mmol/L    Chloride 102 95 - 110 mmol/L    CO2 24 23 - 29 mmol/L    Glucose 94 70 - 110 mg/dL    BUN, Bld 14 6 - 20 mg/dL    Creatinine 0.8 0.5 - 1.4 mg/dL    Calcium 9.8 8.7 - 10.5 mg/dL    Anion Gap 12 8 - 16 mmol/L    eGFR if African American >60.0 >60 mL/min/1.73 m^2    eGFR if non African American >60.0 >60 mL/min/1.73 m^2   CBC auto differential    Collection Time: 12/10/17  6:17 AM   Result Value Ref Range    WBC 3.98 3.90 - 12.70 K/uL    RBC 4.21 (L) 4.60 - 6.20 M/uL    Hemoglobin 11.1 (L) 14.0 - 18.0 g/dL    Hematocrit 34.8 (L) 40.0 - 54.0 %    MCV 83 82 - 98 fL    MCH 26.4 (L) 27.0 - 31.0 pg    MCHC 31.9 (L) 32.0 - 36.0 g/dL    RDW 14.6 (H) 11.5 - 14.5 %    Platelets 274 150 - 350 K/uL    MPV 10.1 9.2 - 12.9 fL    Immature Granulocytes 0.5 0.0 - 0.5 %    Gran # 1.7 (L) 1.8 - 7.7 K/uL    Immature Grans (Abs) 0.02 0.00 - 0.04 K/uL    Lymph # 1.3 1.0 - 4.8 K/uL    Mono # 0.8 0.3 - 1.0 K/uL    Eos # 0.1 0.0 - 0.5 K/uL    Baso # 0.02 0.00 - 0.20 K/uL    nRBC 0 0 /100 WBC    Gran% 42.6 38.0 - 73.0 %    Lymph% 32.7 18.0 - 48.0 %    Mono% 20.9 (H) 4.0 - 15.0 %    Eosinophil% 2.8 0.0 - 8.0 %    Basophil% 0.5 0.0 - 1.9 %    Differential Method Automated          Significant Imaging: No new imaging    Assessment/Plan:      * Discitis of lumbar region    - Stable  - NSGY and ID following, appreciate assistance  - CT  lumbar spine concerning for spondylodiscitis at L4-L5, now s/p aspiration with path showing acute and chronic OM, though blood and urine cultures remain negative to date.  - ESR, CRP mildly elevated earlier this admission.   - Procal normal  - HIV Abs negative  - PT/OT following  - Pain control  - Patient on ceftriaxone and vancomycin as ID's recommendation- desire 6-8 weeks of tx- end date Jan 1st at earliest   - Will plan for follow up in NSGY clinic once ABX are done with MRI w/wo contrast & infectious labs.   - Vancomycin dose looking good based on troughs,  Last trough 19.9        Tobacco use    · Patient reports going outside to smoke  · Counseled patient on smoking cessation, but reports not interested in quitting at this time  · Will have nicotine patch available          History of substance abuse    - Admission drug screen positive for opiates (prescribed to him as an outpatient), but also benzodiazepines, THC, and cocaine  - Not a candidate for PICC placement, so pursuing LTAC placement for long-term IV antibiotics          Acute bilateral low back pain with bilateral sciatica    - Stable  - Patient with discitis and acute on chronic osteomyelitis of lumbar region. Pain mostly unchanged from yesterday.  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, which complicates our discharge planning and pain control regimen  - Symptomatic treatment with current pain regiment- gabapentin 900mg TID, Ibprofen 600mg Q6, baclofen, lidocaine patch and PRN Norco 10mg-325mg for break-through pain  - Continue to monitor              VTE Risk Mitigation         Ordered     enoxaparin injection 40 mg  Daily     Route:  Subcutaneous        11/20/17 1617     Medium Risk of VTE  Once      11/13/17 1910              Mateo Carlson MD  Department of Hospital Medicine   [facility]

## 2017-12-10 NOTE — PLAN OF CARE
Problem: Patient Care Overview  Goal: Plan of Care Review  Outcome: Ongoing (interventions implemented as appropriate)  POC reviewed with pt; acknowledged understanding. Pt remains free from falls/injuries, VSS. Pain managed with PRN meds in MAR. Ambulated with walker and brace. Pt left floor twice this shift. WCTM.

## 2017-12-10 NOTE — PLAN OF CARE
Problem: Patient Care Overview  Goal: Plan of Care Review  Outcome: Ongoing (interventions implemented as appropriate)  No acute events.  Patient AAOx2 resting comfortably in bed, calm and in no distress.  AVSS.  Standard precautions maintained and patient remains afebrile.  IV ABx continued.  C/O pain only moderately relieved by PRN pain medication.  Patient ambulates independently with walker and remains free of falls.  Siderails up x2, call light in reach.  WCTM.

## 2017-12-11 DIAGNOSIS — M46.46 LUMBAR DISCITIS: Primary | ICD-10-CM

## 2017-12-11 PROCEDURE — 63600175 PHARM REV CODE 636 W HCPCS: Performed by: INTERNAL MEDICINE

## 2017-12-11 PROCEDURE — 25000003 PHARM REV CODE 250: Performed by: STUDENT IN AN ORGANIZED HEALTH CARE EDUCATION/TRAINING PROGRAM

## 2017-12-11 PROCEDURE — 99231 SBSQ HOSP IP/OBS SF/LOW 25: CPT | Mod: ,,, | Performed by: HOSPITALIST

## 2017-12-11 PROCEDURE — 25000003 PHARM REV CODE 250: Performed by: INTERNAL MEDICINE

## 2017-12-11 PROCEDURE — 20600001 HC STEP DOWN PRIVATE ROOM

## 2017-12-11 PROCEDURE — 25000003 PHARM REV CODE 250: Performed by: SURGERY

## 2017-12-11 RX ADMIN — ENOXAPARIN SODIUM 40 MG: 100 INJECTION SUBCUTANEOUS at 04:12

## 2017-12-11 RX ADMIN — IBUPROFEN 600 MG: 200 TABLET, FILM COATED ORAL at 08:12

## 2017-12-11 RX ADMIN — VANCOMYCIN HYDROCHLORIDE 1750 MG: 10 INJECTION, POWDER, LYOPHILIZED, FOR SOLUTION INTRAVENOUS at 06:12

## 2017-12-11 RX ADMIN — GABAPENTIN 900 MG: 300 CAPSULE ORAL at 05:12

## 2017-12-11 RX ADMIN — HYDROCODONE BITARTRATE AND ACETAMINOPHEN 1 TABLET: 10; 325 TABLET ORAL at 05:12

## 2017-12-11 RX ADMIN — STANDARDIZED SENNA CONCENTRATE AND DOCUSATE SODIUM 1 TABLET: 8.6; 5 TABLET, FILM COATED ORAL at 09:12

## 2017-12-11 RX ADMIN — HYDROCODONE BITARTRATE AND ACETAMINOPHEN 1 TABLET: 10; 325 TABLET ORAL at 10:12

## 2017-12-11 RX ADMIN — HYDROCODONE BITARTRATE AND ACETAMINOPHEN 1 TABLET: 10; 325 TABLET ORAL at 09:12

## 2017-12-11 RX ADMIN — HYDROCODONE BITARTRATE AND ACETAMINOPHEN 1 TABLET: 10; 325 TABLET ORAL at 06:12

## 2017-12-11 RX ADMIN — GABAPENTIN 900 MG: 300 CAPSULE ORAL at 09:12

## 2017-12-11 RX ADMIN — VANCOMYCIN HYDROCHLORIDE 1750 MG: 10 INJECTION, POWDER, LYOPHILIZED, FOR SOLUTION INTRAVENOUS at 02:12

## 2017-12-11 RX ADMIN — GABAPENTIN 900 MG: 300 CAPSULE ORAL at 01:12

## 2017-12-11 RX ADMIN — VANCOMYCIN HYDROCHLORIDE 1750 MG: 10 INJECTION, POWDER, LYOPHILIZED, FOR SOLUTION INTRAVENOUS at 10:12

## 2017-12-11 RX ADMIN — BACLOFEN 20 MG: 10 TABLET ORAL at 05:12

## 2017-12-11 RX ADMIN — BACLOFEN 20 MG: 10 TABLET ORAL at 01:12

## 2017-12-11 RX ADMIN — CEFEPIME 2 G: 2 INJECTION, POWDER, FOR SOLUTION INTRAVENOUS at 04:12

## 2017-12-11 RX ADMIN — IBUPROFEN 600 MG: 200 TABLET, FILM COATED ORAL at 04:12

## 2017-12-11 RX ADMIN — NYSTATIN AND TRIAMCINOLONE ACETONIDE: 100000; 1 CREAM TOPICAL at 10:12

## 2017-12-11 RX ADMIN — BACLOFEN 20 MG: 10 TABLET ORAL at 09:12

## 2017-12-11 NOTE — ASSESSMENT & PLAN NOTE
- Stable  - NSGY and ID following, appreciate assistance  - CT lumbar spine concerning for spondylodiscitis at L4-L5, now s/p aspiration with path showing acute and chronic OM, though blood and urine cultures remain negative to date.  - ESR, CRP mildly elevated earlier this admission.   - Procal normal  - HIV Abs negative  - PT/OT following  - Pain control  - Patient on ceftriaxone and vancomycin as ID's recommendation- desire 6-8 weeks of tx- end date Jan 1st at earliest   - Will plan for follow up in NSGY clinic once ABX are done with MRI w/wo contrast & infectious labs.

## 2017-12-11 NOTE — PLAN OF CARE
Problem: Patient Care Overview  Goal: Plan of Care Review  Outcome: Ongoing (interventions implemented as appropriate)  POC reviewed with pt; acknowledged understanding. Pt remains free from falls/injuries, VSS. Pain managed with PRN meds in MAR and heat packs. Ambulated independently with walker and brace. WCTM.

## 2017-12-11 NOTE — SUBJECTIVE & OBJECTIVE
Interval History: No acute events overnight.  Continues to complain of muscle spasms.  Review of Systems   Constitutional: Negative for chills, fever and unexpected weight change.   HENT: Negative for hearing loss.    Eyes: Negative for visual disturbance.   Respiratory: Negative for shortness of breath.    Cardiovascular: Negative for chest pain.   Gastrointestinal: Negative for abdominal pain, diarrhea, nausea and vomiting.   Genitourinary: Negative for dysuria.   Musculoskeletal: Positive for back pain. Negative for arthralgias.        Back spasms   Skin: Negative for rash.   Neurological: Negative for seizures and numbness.     Objective:     Vital Signs (Most Recent):  Temp: 97.8 °F (36.6 °C) (12/11/17 1242)  Pulse: 95 (12/11/17 1242)  Resp: 16 (12/11/17 1242)  BP: (!) 148/68 (12/11/17 1242)  SpO2: 96 % (12/11/17 1242) Vital Signs (24h Range):  Temp:  [97.6 °F (36.4 °C)-98 °F (36.7 °C)] 97.8 °F (36.6 °C)  Pulse:  [] 95  Resp:  [16-18] 16  SpO2:  [96 %-100 %] 96 %  BP: (113-148)/(63-76) 148/68     Weight: 90.2 kg (198 lb 13.7 oz)  Body mass index is 25.53 kg/m².    Intake/Output Summary (Last 24 hours) at 12/11/17 1432  Last data filed at 12/11/17 0800   Gross per 24 hour   Intake             1690 ml   Output             1425 ml   Net              265 ml      Physical Exam   Constitutional: He is oriented to person, place, and time. He appears well-developed and well-nourished.   HENT:   Head: Normocephalic and atraumatic.   Eyes: EOM are normal. Pupils are equal, round, and reactive to light.   Neck: No tracheal deviation present. No thyromegaly present.   Cardiovascular: Normal rate.  Exam reveals no gallop and no friction rub.    No murmur heard.  Pulmonary/Chest: Effort normal and breath sounds normal. He has no wheezes. He has no rales.   Abdominal: There is no tenderness. No hernia.   Musculoskeletal: He exhibits tenderness (lumbar spine ). He exhibits no edema.   Neurological: He is alert and  oriented to person, place, and time. No cranial nerve deficit or sensory deficit. He exhibits normal muscle tone.   Skin: Skin is warm. No erythema.   Multiple tattoos   Psychiatric: He has a normal mood and affect.       Significant Labs:   No results found for this or any previous visit (from the past 24 hour(s)).      Significant Imaging: No new imaging

## 2017-12-11 NOTE — PROGRESS NOTES
[facility] Hospital Medicine  Progress Note    Patient Name: Evgeny Wilde  MRN: [id number]  Patient Class: IP- Inpatient   Admission Date: 11/13/2017  Length of Stay: 28 days  Attending Physician: Sara Arauz MD  Primary Care Provider: Primary Doctor [facility] Medicine Team: [facility] 2 Marian Cedillo MD    Subjective:     Principal Problem:Osteomyelitis    HPI:  32 years old male with no significant past medical Hx, present to the ED with progressive lower back pain with sciatica for the last month. He works on constructions, pain start suddenly 1 month ago, at the lower back, dull, aggravated with movements. No inciting factors. Went to [facility] ER, he had CT and MRI and they told him he had disk problem and he need neuro follow up and provided with muscle relaxant and narcotic for pain control. Patient stated the medicine is gone and the ain is worse with numbness in his left leg. He is not working for the last month. Denied any trauma, recent infection, surgery, foreign body, recent IV drug abuse. Also denied any fever, mekhi loss, fecal or urine incontinence, saddle anaesthesia.          Hospital Course:  11/14: NSGY following             Blood cultures NGTD, Urine cultures pending.              CT lumbar spine concerning for spondylodiscitis.  11/15: afebrile overnight, blood CX NGTD, NSGY stated no indications for NSGY interventions this admission, they want follow him up as outpatient after after Abx. We will pursue IR for disk Bx/aspirate for Dx.     11/16: NAEON. Vitals stable. Pending disc aspiration via IR tomorrow.  11/17: NAEON. Vitals stable. Planned for L4-L5 biopsy today.  11/18: NAEON. Vitals stable. S/p L4-L5 disc aspiration. Cultures, pathology report pending.  11/19: NAEON. Vitals stable.S/p L4-L5 disc aspiration. Cultures, pathology report pending.  11/20: NAEON. Vitals stable. Pathology showing acute and chronic OM. Cultures remain  negative.  11/21: NAEON. Vitals stable. Patient with trouble sleeping last night with pain in the buttocks and back as well as numbness in the toes. Pt still without adequate pain control.  11/22: NAEON. VSS. Patient with increased pain issues today with pain radiating from the back down to his feet.   11/22: NAEON. VSS. Patient with better pain control today.  11/23: NAEON. VSS. Patient with better pain control today.  11/24: NAEON. VSS. Patient asking for increased frequency of pain meds today.  11/27: NAEON. VSS.   11/28: NAEON. VSS. Patient with spasms still but no other complaints.  11/29: NAEON. VSS. Patient with spasms but no other complaints.  11/30: NAEON. VSS. Patient with spasms but no other complaints.  12/2: NAEON. VSS. Patient with continued pain and spasms.  12/3: NAEON. VSS. Patient with continued pain and spasms.  12/4:  Patient with lumbar back pain that was different than normal back pain.  Given 5 mg of oxycodone with some pain relief.  Remains afebrile with normal vitals. 12/5: Mr. Wilde reports difficulty sleeping secondary to muscle spasm.  Transitioned to baclofen yesterday with minimal to no improvement in symptoms.  Continues to be afebrile but has been intermittently tachycardic which coincides with increase in pain per patient  12/6  Back pain and muslce spasm improvied, patient able to sleep at night for the first time in 2 days, vanc level 19.9.  12/7:  Patient not in room during initial rounds this AM as he went outside and reports smoking a cigarette.  He continues to complain of muscle spasms that are improved with activity as well as skin irritation in the skin folds in the pelvic area.    12/8:  No acute events overnight.  Patient intermittently tachycardic.  Pain and spasms better controlled.  Skin irritation in pelvic region improved with cream.  No acute events overnight.  Patient intermittently tachycardic.  Pain and spasms better controlled.  Skin irritation in pelvic region  improved with cream.    12/9:  Patient off the floor this AM to go outside to smoke.  Patients nurse concern that patient returned sedated.  He continues to complain of muscle spasms.    12/11:NAEON. Afebrile and HD stable this AM.  complaining of muscle spasms. Otherwise, no complaints.    Interval History: No acute events overnight.  Continues to complain of muscle spasms.  Review of Systems   Constitutional: Negative for chills, fever and unexpected weight change.   HENT: Negative for hearing loss.    Eyes: Negative for visual disturbance.   Respiratory: Negative for shortness of breath.    Cardiovascular: Negative for chest pain.   Gastrointestinal: Negative for abdominal pain, diarrhea, nausea and vomiting.   Genitourinary: Negative for dysuria.   Musculoskeletal: Positive for back pain. Negative for arthralgias.        Back spasms   Skin: Negative for rash.   Neurological: Negative for seizures and numbness.     Objective:     Vital Signs (Most Recent):  Temp: 97.8 °F (36.6 °C) (12/11/17 1242)  Pulse: 95 (12/11/17 1242)  Resp: 16 (12/11/17 1242)  BP: (!) 148/68 (12/11/17 1242)  SpO2: 96 % (12/11/17 1242) Vital Signs (24h Range):  Temp:  [97.6 °F (36.4 °C)-98 °F (36.7 °C)] 97.8 °F (36.6 °C)  Pulse:  [] 95  Resp:  [16-18] 16  SpO2:  [96 %-100 %] 96 %  BP: (113-148)/(63-76) 148/68     Weight: 90.2 kg (198 lb 13.7 oz)  Body mass index is 25.53 kg/m².    Intake/Output Summary (Last 24 hours) at 12/11/17 1432  Last data filed at 12/11/17 0800   Gross per 24 hour   Intake             1690 ml   Output             1425 ml   Net              265 ml      Physical Exam   Constitutional: He is oriented to person, place, and time. He appears well-developed and well-nourished.   HENT:   Head: Normocephalic and atraumatic.   Eyes: EOM are normal. Pupils are equal, round, and reactive to light.   Neck: No tracheal deviation present. No thyromegaly present.   Cardiovascular: Normal rate.  Exam reveals no gallop and no  friction rub.    No murmur heard.  Pulmonary/Chest: Effort normal and breath sounds normal. He has no wheezes. He has no rales.   Abdominal: There is no tenderness. No hernia.   Musculoskeletal: He exhibits tenderness (lumbar spine ). He exhibits no edema.   Neurological: He is alert and oriented to person, place, and time. No cranial nerve deficit or sensory deficit. He exhibits normal muscle tone.   Skin: Skin is warm. No erythema.   Multiple tattoos   Psychiatric: He has a normal mood and affect.       Significant Labs:   No results found for this or any previous visit (from the past 24 hour(s)).      Significant Imaging: No new imaging    Assessment/Plan:      Tobacco use    · Patient reports going outside to smoke  · Counseled patient on smoking cessation, but reports not interested in quitting at this time  · Will have nicotine patch available          History of substance abuse    - Admission drug screen positive for opiates (prescribed to him as an outpatient), but also benzodiazepines, THC, and cocaine  - Not a candidate for PICC placement, so pursuing LTAC placement for long-term IV antibiotics          Discitis of lumbar region    - Stable  - NSGY and ID following, appreciate assistance  - CT lumbar spine concerning for spondylodiscitis at L4-L5, now s/p aspiration with path showing acute and chronic OM, though blood and urine cultures remain negative to date.  - ESR, CRP mildly elevated earlier this admission.   - Procal normal  - HIV Abs negative  - PT/OT following  - Pain control  - Patient on ceftriaxone and vancomycin as ID's recommendation- desire 6-8 weeks of tx- end date Jan 1st at earliest   - Will plan for follow up in NSGY clinic once ABX are done with MRI w/wo contrast & infectious labs.           Acute bilateral low back pain with bilateral sciatica    - Stable  - Patient with discitis and acute on chronic osteomyelitis of lumbar region. Pain mostly unchanged from yesterday.  - Notably  patient is an IVDU, which complicates our discharge planning and pain control regimen  - Symptomatic treatment with current pain regiment- gabapentin 900mg TID, Ibprofen 600mg Q6, baclofen, lidocaine patch and PRN Norco 10mg-325mg for break-through pain  - Continue to monitor              VTE Risk Mitigation         Ordered     enoxaparin injection 40 mg  Daily     Route:  Subcutaneous        11/20/17 1617     Medium Risk of VTE  Once      11/13/17 1910          Dispo: completing course of IV abx and pending LTAC placement    Marian Cedillo MD  Department of Hospital Medicine   [facility]

## 2017-12-11 NOTE — PLAN OF CARE
No information from SNF referrals or LTAC referrals, Sw will restart process or ask leadership for any suggestions for Pt to move from acute level of care.

## 2017-12-12 LAB
ANION GAP SERPL CALC-SCNC: 10 MMOL/L
BASOPHILS # BLD AUTO: 0.02 K/UL
BASOPHILS NFR BLD: 0.4 %
BUN SERPL-MCNC: 13 MG/DL
CALCIUM SERPL-MCNC: 9.9 MG/DL
CHLORIDE SERPL-SCNC: 103 MMOL/L
CO2 SERPL-SCNC: 27 MMOL/L
CREAT SERPL-MCNC: 0.8 MG/DL
DIFFERENTIAL METHOD: ABNORMAL
EOSINOPHIL # BLD AUTO: 0.2 K/UL
EOSINOPHIL NFR BLD: 3 %
ERYTHROCYTE [DISTWIDTH] IN BLOOD BY AUTOMATED COUNT: 14.7 %
EST. GFR  (AFRICAN AMERICAN): >60 ML/MIN/1.73 M^2
EST. GFR  (NON AFRICAN AMERICAN): >60 ML/MIN/1.73 M^2
GLUCOSE SERPL-MCNC: 117 MG/DL
HCT VFR BLD AUTO: 32 %
HGB BLD-MCNC: 10.1 G/DL
IMM GRANULOCYTES # BLD AUTO: 0.01 K/UL
IMM GRANULOCYTES NFR BLD AUTO: 0.2 %
LYMPHOCYTES # BLD AUTO: 1.5 K/UL
LYMPHOCYTES NFR BLD: 29.9 %
MCH RBC QN AUTO: 26.3 PG
MCHC RBC AUTO-ENTMCNC: 31.6 G/DL
MCV RBC AUTO: 83 FL
MONOCYTES # BLD AUTO: 0.9 K/UL
MONOCYTES NFR BLD: 18 %
NEUTROPHILS # BLD AUTO: 2.4 K/UL
NEUTROPHILS NFR BLD: 48.5 %
NRBC BLD-RTO: 0 /100 WBC
PLATELET # BLD AUTO: 245 K/UL
PMV BLD AUTO: 10.1 FL
POTASSIUM SERPL-SCNC: 4.1 MMOL/L
RBC # BLD AUTO: 3.84 M/UL
SODIUM SERPL-SCNC: 140 MMOL/L
VANCOMYCIN SERPL-MCNC: 14.3 UG/ML
VANCOMYCIN TROUGH SERPL-MCNC: 23 UG/ML
WBC # BLD AUTO: 5.01 K/UL

## 2017-12-12 PROCEDURE — 36415 COLL VENOUS BLD VENIPUNCTURE: CPT

## 2017-12-12 PROCEDURE — 20600001 HC STEP DOWN PRIVATE ROOM

## 2017-12-12 PROCEDURE — 85025 COMPLETE CBC W/AUTO DIFF WBC: CPT

## 2017-12-12 PROCEDURE — 25000003 PHARM REV CODE 250: Performed by: SURGERY

## 2017-12-12 PROCEDURE — 80202 ASSAY OF VANCOMYCIN: CPT | Mod: 91

## 2017-12-12 PROCEDURE — 80048 BASIC METABOLIC PNL TOTAL CA: CPT

## 2017-12-12 PROCEDURE — 99231 SBSQ HOSP IP/OBS SF/LOW 25: CPT | Mod: ,,, | Performed by: HOSPITALIST

## 2017-12-12 PROCEDURE — 25000003 PHARM REV CODE 250: Performed by: INTERNAL MEDICINE

## 2017-12-12 PROCEDURE — 25000003 PHARM REV CODE 250: Performed by: HOSPITALIST

## 2017-12-12 PROCEDURE — 63600175 PHARM REV CODE 636 W HCPCS: Performed by: HOSPITALIST

## 2017-12-12 PROCEDURE — 25000003 PHARM REV CODE 250: Performed by: STUDENT IN AN ORGANIZED HEALTH CARE EDUCATION/TRAINING PROGRAM

## 2017-12-12 PROCEDURE — 80202 ASSAY OF VANCOMYCIN: CPT

## 2017-12-12 PROCEDURE — 63600175 PHARM REV CODE 636 W HCPCS: Performed by: INTERNAL MEDICINE

## 2017-12-12 RX ADMIN — CEFEPIME 2 G: 2 INJECTION, POWDER, FOR SOLUTION INTRAVENOUS at 06:12

## 2017-12-12 RX ADMIN — HYDROCODONE BITARTRATE AND ACETAMINOPHEN 1 TABLET: 10; 325 TABLET ORAL at 10:12

## 2017-12-12 RX ADMIN — HYDROCODONE BITARTRATE AND ACETAMINOPHEN 1 TABLET: 10; 325 TABLET ORAL at 06:12

## 2017-12-12 RX ADMIN — VANCOMYCIN HYDROCHLORIDE 1750 MG: 10 INJECTION, POWDER, LYOPHILIZED, FOR SOLUTION INTRAVENOUS at 04:12

## 2017-12-12 RX ADMIN — BACLOFEN 20 MG: 10 TABLET ORAL at 10:12

## 2017-12-12 RX ADMIN — NYSTATIN AND TRIAMCINOLONE ACETONIDE: 100000; 1 CREAM TOPICAL at 10:12

## 2017-12-12 RX ADMIN — BACLOFEN 20 MG: 10 TABLET ORAL at 06:12

## 2017-12-12 RX ADMIN — GABAPENTIN 900 MG: 300 CAPSULE ORAL at 06:12

## 2017-12-12 RX ADMIN — IBUPROFEN 600 MG: 200 TABLET, FILM COATED ORAL at 01:12

## 2017-12-12 RX ADMIN — ENOXAPARIN SODIUM 40 MG: 100 INJECTION SUBCUTANEOUS at 06:12

## 2017-12-12 RX ADMIN — VANCOMYCIN HYDROCHLORIDE 1750 MG: 10 INJECTION, POWDER, LYOPHILIZED, FOR SOLUTION INTRAVENOUS at 02:12

## 2017-12-12 RX ADMIN — VANCOMYCIN HYDROCHLORIDE 1750 MG: 10 INJECTION, POWDER, LYOPHILIZED, FOR SOLUTION INTRAVENOUS at 11:12

## 2017-12-12 RX ADMIN — GABAPENTIN 900 MG: 300 CAPSULE ORAL at 02:12

## 2017-12-12 RX ADMIN — GABAPENTIN 900 MG: 300 CAPSULE ORAL at 10:12

## 2017-12-12 RX ADMIN — STANDARDIZED SENNA CONCENTRATE AND DOCUSATE SODIUM 1 TABLET: 8.6; 5 TABLET, FILM COATED ORAL at 10:12

## 2017-12-12 RX ADMIN — BACLOFEN 20 MG: 10 TABLET ORAL at 02:12

## 2017-12-12 NOTE — SUBJECTIVE & OBJECTIVE
Interval History: No acute events.  Vancomycin trough elevated at 23 this AM so second dose of vancomycin ordered to be held.  Continues to have back spasms while in bed which are improved with ambulation.      Review of Systems   Constitutional: Negative for chills, fever and unexpected weight change.   HENT: Negative for hearing loss.    Eyes: Negative for visual disturbance.   Respiratory: Negative for shortness of breath.    Cardiovascular: Negative for chest pain.   Gastrointestinal: Negative for abdominal pain, diarrhea, nausea and vomiting.   Genitourinary: Negative for dysuria.   Musculoskeletal: Positive for back pain. Negative for arthralgias.        Back spasms   Skin: Negative for rash.   Neurological: Negative for seizures and numbness.     Objective:     Vital Signs (Most Recent):  Temp: 97.6 °F (36.4 °C) (12/12/17 1015)  Pulse: (!) 114 (12/12/17 1015)  Resp: 16 (12/12/17 1015)  BP: 139/72 (12/12/17 1015)  SpO2: 95 % (12/12/17 1015) Vital Signs (24h Range):  Temp:  [97.6 °F (36.4 °C)-98.9 °F (37.2 °C)] 97.6 °F (36.4 °C)  Pulse:  [] 114  Resp:  [16-18] 16  SpO2:  [95 %-100 %] 95 %  BP: (120-139)/() 139/72     Weight: 90.2 kg (198 lb 13.7 oz)  Body mass index is 25.53 kg/m².    Intake/Output Summary (Last 24 hours) at 12/12/17 1406  Last data filed at 12/12/17 1100   Gross per 24 hour   Intake             1540 ml   Output             1000 ml   Net              540 ml      Physical Exam   Constitutional: He is oriented to person, place, and time. He appears well-developed and well-nourished.   HENT:   Head: Normocephalic and atraumatic.   Eyes: EOM are normal. Pupils are equal, round, and reactive to light.   Neck: No tracheal deviation present. No thyromegaly present.   Cardiovascular: Normal rate.  Exam reveals no gallop and no friction rub.    No murmur heard.  Pulmonary/Chest: Effort normal and breath sounds normal. He has no wheezes. He has no rales.   Abdominal: There is no tenderness.  No hernia.   Musculoskeletal: He exhibits tenderness (lumbar spine ). He exhibits no edema.   Neurological: He is alert and oriented to person, place, and time. No cranial nerve deficit or sensory deficit. He exhibits normal muscle tone.   Skin: Skin is warm. No erythema.   Multiple tattoos   Psychiatric: He has a normal mood and affect.       Significant Labs:   Recent Results (from the past 24 hour(s))   VANCOMYCIN, TROUGH before 4th dose    Collection Time: 12/12/17  1:50 AM   Result Value Ref Range    Vancomycin-Trough 23.0 (H) 10.0 - 22.0 ug/mL   Basic Metabolic Panel (BMP)    Collection Time: 12/12/17  4:26 AM   Result Value Ref Range    Sodium 140 136 - 145 mmol/L    Potassium 4.1 3.5 - 5.1 mmol/L    Chloride 103 95 - 110 mmol/L    CO2 27 23 - 29 mmol/L    Glucose 117 (H) 70 - 110 mg/dL    BUN, Bld 13 6 - 20 mg/dL    Creatinine 0.8 0.5 - 1.4 mg/dL    Calcium 9.9 8.7 - 10.5 mg/dL    Anion Gap 10 8 - 16 mmol/L    eGFR if African American >60.0 >60 mL/min/1.73 m^2    eGFR if non African American >60.0 >60 mL/min/1.73 m^2   CBC auto differential    Collection Time: 12/12/17  4:26 AM   Result Value Ref Range    WBC 5.01 3.90 - 12.70 K/uL    RBC 3.84 (L) 4.60 - 6.20 M/uL    Hemoglobin 10.1 (L) 14.0 - 18.0 g/dL    Hematocrit 32.0 (L) 40.0 - 54.0 %    MCV 83 82 - 98 fL    MCH 26.3 (L) 27.0 - 31.0 pg    MCHC 31.6 (L) 32.0 - 36.0 g/dL    RDW 14.7 (H) 11.5 - 14.5 %    Platelets 245 150 - 350 K/uL    MPV 10.1 9.2 - 12.9 fL    Immature Granulocytes 0.2 0.0 - 0.5 %    Gran # 2.4 1.8 - 7.7 K/uL    Immature Grans (Abs) 0.01 0.00 - 0.04 K/uL    Lymph # 1.5 1.0 - 4.8 K/uL    Mono # 0.9 0.3 - 1.0 K/uL    Eos # 0.2 0.0 - 0.5 K/uL    Baso # 0.02 0.00 - 0.20 K/uL    nRBC 0 0 /100 WBC    Gran% 48.5 38.0 - 73.0 %    Lymph% 29.9 18.0 - 48.0 %    Mono% 18.0 (H) 4.0 - 15.0 %    Eosinophil% 3.0 0.0 - 8.0 %    Basophil% 0.4 0.0 - 1.9 %    Differential Method Automated          Significant Imaging: No new imaging

## 2017-12-12 NOTE — ASSESSMENT & PLAN NOTE
- Stable  - NSGY and ID following, appreciate assistance  - CT lumbar spine concerning for spondylodiscitis at L4-L5, now s/p aspiration with path showing acute and chronic OM, though blood and urine cultures remain negative to date.  - ESR, CRP mildly elevated earlier this admission.   - Procal normal  - HIV Abs negative  - PT/OT following  - Pain control  - Patient on ceftriaxone and vancomycin as ID's recommendation- desire 6-8 weeks of tx- end date Jan 1st at earliest   - Will plan for follow up in NSGY clinic once ABX are done with MRI w/wo contrast & infectious labs.   - Held one dose of vancomycin due to elevated trough

## 2017-12-12 NOTE — PROGRESS NOTES
[facility] Hospital Medicine  Progress Note    Patient Name: Evgeny Wilde  MRN: [id number]  Patient Class: IP- Inpatient   Admission Date: 11/13/2017  Length of Stay: 29 days  Attending Physician: Sara Arauz MD  Primary Care Provider: Primary Doctor [facility] Medicine [place]: [facility] [place] [facility] Mateo Carlson MD    Subjective:     Principal Problem:Osteomyelitis    HPI:  32 years old male with no significant past medical Hx, present to the ED with progressive lower back pain with sciatica for the last month. He works on constructions, pain start suddenly 1 month ago, at the lower back, dull, aggravated with movements. No inciting factors. Went to [facility] and [facility] ER, he had CT and MRI and they told him he had disk problem and he need neuro follow up and provided with muscle relaxant and narcotic for pain control. Patient stated the medicine is gone and the ain is worse with numbness in his left leg. He is not working for the last month. Denied any trauma, recent infection, surgery, foreign body, recent IV drug abuse. Also denied any fever, mekhi loss, fecal or urine incontinence, saddle anaesthesia.          Hospital Course:  11/14: NSGY following             Blood cultures NGTD, Urine cultures pending.              CT lumbar spine concerning for spondylodiscitis.  11/15: afebrile overnight, blood CX NGTD, NSGY stated no indications for NSGY interventions this admission, they want follow him up as outpatient after after Abx. We will pursue IR for disk Bx/aspirate for Dx.     11/16: NAEON. Vitals stable. Pending disc aspiration via IR tomorrow.  11/17: NAEON. Vitals stable. Planned for L4-L5 biopsy today.  11/18: NAEON. Vitals stable. S/p L4-L5 disc aspiration. Cultures, pathology report pending.  11/19: NAEON. Vitals stable.S/p L4-L5 disc aspiration. Cultures, pathology report pending.  11/20: NAEON. Vitals stable. Pathology showing acute and chronic OM. Cultures remain negative.  11/21:  NAEON. Vitals stable. Patient with trouble sleeping last night with pain in the buttocks and back as well as numbness in the toes. Pt still without adequate pain control.  11/22: NAEON. VSS. Patient with increased pain issues today with pain radiating from the back down to his feet.   11/22: NAEON. VSS. Patient with better pain control today.  11/23: NAEON. VSS. Patient with better pain control today.  11/24: NAEON. VSS. Patient asking for increased frequency of pain meds today.  11/27: NAEON. VSS.   11/28: NAEON. VSS. Patient with spasms still but no other complaints.  11/29: NAEON. VSS. Patient with spasms but no other complaints.  11/30: NAEON. VSS. Patient with spasms but no other complaints.  12/2: NAEON. VSS. Patient with continued pain and spasms.  12/3: NAEON. VSS. Patient with continued pain and spasms.  12/4:  Patient with lumbar back pain that was different than normal back pain.  Given 5 mg of oxycodone with some pain relief.  Remains afebrile with normal vitals. 12/5: Mr. Wilde reports difficulty sleeping secondary to muscle spasm.  Transitioned to baclofen yesterday with minimal to no improvement in symptoms.  Continues to be afebrile but has been intermittently tachycardic which coincides with increase in pain per patient  12/6  Back pain and muslce spasm improvied, patient able to sleep at night for the first time in 2 days, vanc level 19.9.  12/7:  Patient not in room during initial rounds this AM as he went outside and reports smoking a cigarette.  He continues to complain of muscle spasms that are improved with activity as well as skin irritation in the skin folds in the pelvic area.    12/8:  No acute events overnight.  Patient intermittently tachycardic.  Pain and spasms better controlled.  Skin irritation in pelvic region improved with cream.  No acute events overnight.  Patient intermittently tachycardic.  Pain and spasms better controlled.  Skin irritation in pelvic region improved with cream.     12/9:  Patient off the floor this AM to go outside to smoke.  Patients nurse concern that patient returned sedated.  He continues to complain of muscle spasms.    12/10: No acute events overnight.  Continued spasms while in bed otherwise no complaints  12/11:NAEON. Afebrile and HD stable this AM.  complaining of muscle spasms. Otherwise, no complaints.    Interval History: No acute events.  Vancomycin trough elevated at 23 this AM so second dose of vancomycin ordered to be held.  Continues to have back spasms while in bed which are improved with ambulation.      Review of Systems   Constitutional: Negative for chills, fever and unexpected weight change.   HENT: Negative for hearing loss.    Eyes: Negative for visual disturbance.   Respiratory: Negative for shortness of breath.    Cardiovascular: Negative for chest pain.   Gastrointestinal: Negative for abdominal pain, diarrhea, nausea and vomiting.   Genitourinary: Negative for dysuria.   Musculoskeletal: Positive for back pain. Negative for arthralgias.        Back spasms   Skin: Negative for rash.   Neurological: Negative for seizures and numbness.     Objective:     Vital Signs (Most Recent):  Temp: 97.6 °F (36.4 °C) (12/12/17 1015)  Pulse: (!) 114 (12/12/17 1015)  Resp: 16 (12/12/17 1015)  BP: 139/72 (12/12/17 1015)  SpO2: 95 % (12/12/17 1015) Vital Signs (24h Range):  Temp:  [97.6 °F (36.4 °C)-98.9 °F (37.2 °C)] 97.6 °F (36.4 °C)  Pulse:  [] 114  Resp:  [16-18] 16  SpO2:  [95 %-100 %] 95 %  BP: (120-139)/() 139/72     Weight: 90.2 kg (198 lb 13.7 oz)  Body mass index is 25.53 kg/m².    Intake/Output Summary (Last 24 hours) at 12/12/17 1406  Last data filed at 12/12/17 1100   Gross per 24 hour   Intake             1540 ml   Output             1000 ml   Net              540 ml      Physical Exam   Constitutional: He is oriented to person, place, and time. He appears well-developed and well-nourished.   HENT:   Head: Normocephalic and atraumatic.    Eyes: EOM are normal. Pupils are equal, round, and reactive to light.   Neck: No tracheal deviation present. No thyromegaly present.   Cardiovascular: Normal rate.  Exam reveals no gallop and no friction rub.    No murmur heard.  Pulmonary/Chest: Effort normal and breath sounds normal. He has no wheezes. He has no rales.   Abdominal: There is no tenderness. No hernia.   Musculoskeletal: He exhibits tenderness (lumbar spine ). He exhibits no edema.   Neurological: He is alert and oriented to person, place, and time. No cranial nerve deficit or sensory deficit. He exhibits normal muscle tone.   Skin: Skin is warm. No erythema.   Multiple tattoos   Psychiatric: He has a normal mood and affect.       Significant Labs:   Recent Results (from the past 24 hour(s))   VANCOMYCIN, TROUGH before 4th dose    Collection Time: 12/12/17  1:50 AM   Result Value Ref Range    Vancomycin-Trough 23.0 (H) 10.0 - 22.0 ug/mL   Basic Metabolic Panel (BMP)    Collection Time: 12/12/17  4:26 AM   Result Value Ref Range    Sodium 140 136 - 145 mmol/L    Potassium 4.1 3.5 - 5.1 mmol/L    Chloride 103 95 - 110 mmol/L    CO2 27 23 - 29 mmol/L    Glucose 117 (H) 70 - 110 mg/dL    BUN, Bld 13 6 - 20 mg/dL    Creatinine 0.8 0.5 - 1.4 mg/dL    Calcium 9.9 8.7 - 10.5 mg/dL    Anion Gap 10 8 - 16 mmol/L    eGFR if African American >60.0 >60 mL/min/1.73 m^2    eGFR if non African American >60.0 >60 mL/min/1.73 m^2   CBC auto differential    Collection Time: 12/12/17  4:26 AM   Result Value Ref Range    WBC 5.01 3.90 - 12.70 K/uL    RBC 3.84 (L) 4.60 - 6.20 M/uL    Hemoglobin 10.1 (L) 14.0 - 18.0 g/dL    Hematocrit 32.0 (L) 40.0 - 54.0 %    MCV 83 82 - 98 fL    MCH 26.3 (L) 27.0 - 31.0 pg    MCHC 31.6 (L) 32.0 - 36.0 g/dL    RDW 14.7 (H) 11.5 - 14.5 %    Platelets 245 150 - 350 K/uL    MPV 10.1 9.2 - 12.9 fL    Immature Granulocytes 0.2 0.0 - 0.5 %    Gran # 2.4 1.8 - 7.7 K/uL    Immature Grans (Abs) 0.01 0.00 - 0.04 K/uL    Lymph # 1.5 1.0 - 4.8 K/uL     Mono # 0.9 0.3 - 1.0 K/uL    Eos # 0.2 0.0 - 0.5 K/uL    Baso # 0.02 0.00 - 0.20 K/uL    nRBC 0 0 /100 WBC    Gran% 48.5 38.0 - 73.0 %    Lymph% 29.9 18.0 - 48.0 %    Mono% 18.0 (H) 4.0 - 15.0 %    Eosinophil% 3.0 0.0 - 8.0 %    Basophil% 0.4 0.0 - 1.9 %    Differential Method Automated          Significant Imaging: No new imaging    Assessment/Plan:      Tobacco use    · Patient reports going outside to smoke  · Counseled patient on smoking cessation, but reports not interested in quitting at this time  · Will have nicotine patch available          History of substance abuse    - Admission drug screen positive for opiates (prescribed to him as an outpatient), but also benzodiazepines, THC, and cocaine  - Not a candidate for PICC placement, so pursuing LTAC placement for long-term IV antibiotics          Discitis of lumbar region    - Stable  - NSGY and ID following, appreciate assistance  - CT lumbar spine concerning for spondylodiscitis at L4-L5, now s/p aspiration with path showing acute and chronic OM, though blood and urine cultures remain negative to date.  - ESR, CRP mildly elevated earlier this admission.   - Procal normal  - HIV Abs negative  - PT/OT following  - Pain control  - Patient on ceftriaxone and vancomycin as ID's recommendation- desire 6-8 weeks of tx- end date Jan 1st at earliest   - Will plan for follow up in NSGY clinic once ABX are done with MRI w/wo contrast & infectious labs.   - Held one dose of vancomycin due to elevated trough        Acute bilateral low back pain with bilateral sciatica    - Stable  - Patient with discitis and acute on chronic osteomyelitis of lumbar region. Pain mostly unchanged from yesterday.  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

## 2017-12-12 NOTE — PLAN OF CARE
Problem: Patient Care Overview  Goal: Plan of Care Review  Outcome: Ongoing (interventions implemented as appropriate)  No acute events.  Patient AAOx4 resting comfortably in bed, calm and in no distress.  AVSS.  Standard precautions maintained and patient remains afebrile.  IV ABx continued.  C/O pain only moderately relieved by PRN pain medication.  Patient ambulates independently with walker and remains free of falls.  Siderails up x2, call light in reach.  WCTM.

## 2017-12-12 NOTE — PLAN OF CARE
Pt's mother did meet with Sw outside room, informed of Sw's continued effort to have an LTAC facility accept Pt and receive auth from Pt's insurance company. Sw did upload LTAC referrals to both [facility] and [facility] which is also a [facility] facility. Sw to follow.

## 2017-12-13 LAB — VANCOMYCIN TROUGH SERPL-MCNC: 19 UG/ML

## 2017-12-13 PROCEDURE — 25000003 PHARM REV CODE 250: Performed by: STUDENT IN AN ORGANIZED HEALTH CARE EDUCATION/TRAINING PROGRAM

## 2017-12-13 PROCEDURE — 36415 COLL VENOUS BLD VENIPUNCTURE: CPT

## 2017-12-13 PROCEDURE — 80202 ASSAY OF VANCOMYCIN: CPT

## 2017-12-13 PROCEDURE — 25000003 PHARM REV CODE 250: Performed by: INTERNAL MEDICINE

## 2017-12-13 PROCEDURE — 63600175 PHARM REV CODE 636 W HCPCS: Performed by: INTERNAL MEDICINE

## 2017-12-13 PROCEDURE — 25000003 PHARM REV CODE 250: Performed by: SURGERY

## 2017-12-13 PROCEDURE — 25000003 PHARM REV CODE 250: Performed by: HOSPITALIST

## 2017-12-13 PROCEDURE — 63600175 PHARM REV CODE 636 W HCPCS: Performed by: HOSPITALIST

## 2017-12-13 PROCEDURE — 20600001 HC STEP DOWN PRIVATE ROOM

## 2017-12-13 PROCEDURE — 99231 SBSQ HOSP IP/OBS SF/LOW 25: CPT | Mod: ,,, | Performed by: HOSPITALIST

## 2017-12-13 RX ADMIN — NYSTATIN AND TRIAMCINOLONE ACETONIDE: 100000; 1 CREAM TOPICAL at 09:12

## 2017-12-13 RX ADMIN — GABAPENTIN 900 MG: 300 CAPSULE ORAL at 02:12

## 2017-12-13 RX ADMIN — HYDROCODONE BITARTRATE AND ACETAMINOPHEN 1 TABLET: 10; 325 TABLET ORAL at 02:12

## 2017-12-13 RX ADMIN — BACLOFEN 20 MG: 10 TABLET ORAL at 09:12

## 2017-12-13 RX ADMIN — HYDROCODONE BITARTRATE AND ACETAMINOPHEN 1 TABLET: 10; 325 TABLET ORAL at 09:12

## 2017-12-13 RX ADMIN — STANDARDIZED SENNA CONCENTRATE AND DOCUSATE SODIUM 1 TABLET: 8.6; 5 TABLET, FILM COATED ORAL at 09:12

## 2017-12-13 RX ADMIN — VANCOMYCIN HYDROCHLORIDE 1750 MG: 10 INJECTION, POWDER, LYOPHILIZED, FOR SOLUTION INTRAVENOUS at 05:12

## 2017-12-13 RX ADMIN — GABAPENTIN 900 MG: 300 CAPSULE ORAL at 05:12

## 2017-12-13 RX ADMIN — HYDROCODONE BITARTRATE AND ACETAMINOPHEN 1 TABLET: 10; 325 TABLET ORAL at 11:12

## 2017-12-13 RX ADMIN — ENOXAPARIN SODIUM 40 MG: 100 INJECTION SUBCUTANEOUS at 04:12

## 2017-12-13 RX ADMIN — CEFEPIME 2 G: 2 INJECTION, POWDER, FOR SOLUTION INTRAVENOUS at 07:12

## 2017-12-13 RX ADMIN — VANCOMYCIN HYDROCHLORIDE 1750 MG: 10 INJECTION, POWDER, LYOPHILIZED, FOR SOLUTION INTRAVENOUS at 11:12

## 2017-12-13 RX ADMIN — HYDROCODONE BITARTRATE AND ACETAMINOPHEN 1 TABLET: 10; 325 TABLET ORAL at 07:12

## 2017-12-13 RX ADMIN — BACLOFEN 20 MG: 10 TABLET ORAL at 05:12

## 2017-12-13 RX ADMIN — BACLOFEN 20 MG: 10 TABLET ORAL at 02:12

## 2017-12-13 RX ADMIN — VANCOMYCIN HYDROCHLORIDE 1750 MG: 10 INJECTION, POWDER, LYOPHILIZED, FOR SOLUTION INTRAVENOUS at 09:12

## 2017-12-13 RX ADMIN — GABAPENTIN 900 MG: 300 CAPSULE ORAL at 09:12

## 2017-12-13 NOTE — PROGRESS NOTES
[facility] Hospital Medicine  Progress Note    Patient Name: Evgeny Wilde  MRN: [id number]  Patient Class: IP- Inpatient   Admission Date: 11/13/2017  Length of Stay: 30 days  Attending Physician: Sara Arauz MD  Primary Care Provider: Primary Doctor [facility] Medicine Team: [facility] MED 2 Mateo Carlson MD    Subjective:     Principal Problem:Osteomyelitis    HPI:  32 years old male with no significant past medical Hx, present to the ED with progressive lower back pain with sciatica for the last month. He works on constructions, pain start suddenly 1 month ago, at the lower back, dull, aggravated with movements. No inciting factors. Went to [facility] and [facility] ER, he had CT and MRI and they told him he had disk problem and he need neuro follow up and provided with muscle relaxant and narcotic for pain control. Patient stated the medicine is gone and the ain is worse with numbness in his left leg. He is not working for the last month. Denied any trauma, recent infection, surgery, foreign body, recent IV drug abuse. Also denied any fever, mekhi loss, fecal or urine incontinence, saddle anaesthesia.          Hospital Course:  11/14: NSGY following             Blood cultures NGTD, Urine cultures pending.              CT lumbar spine concerning for spondylodiscitis.  11/15: afebrile overnight, blood CX NGTD, NSGY stated no indications for NSGY interventions this admission, they want follow him up as outpatient after after Abx. We will pursue IR for disk Bx/aspirate for Dx.     11/16: NAEON. Vitals stable. Pending disc aspiration via IR tomorrow.  11/17: NAEON. Vitals stable. Planned for L4-L5 biopsy today.  11/18: NAEON. Vitals stable. S/p L4-L5 disc aspiration. Cultures, pathology report pending.  11/19: NAEON. Vitals stable.S/p L4-L5 disc aspiration. Cultures, pathology report pending.  11/20: NAEON. Vitals stable. Pathology showing acute and chronic OM. Cultures remain negative.  11/21:  NAEON. Vitals stable. Patient with trouble sleeping last night with pain in the buttocks and back as well as numbness in the toes. Pt still without adequate pain control.  11/22: NAEON. VSS. Patient with increased pain issues today with pain radiating from the back down to his feet.   11/22: NAEON. VSS. Patient with better pain control today.  11/23: NAEON. VSS. Patient with better pain control today.  11/24: NAEON. VSS. Patient asking for increased frequency of pain meds today.  11/27: NAEON. VSS.   11/28: NAEON. VSS. Patient with spasms still but no other complaints.  11/29: NAEON. VSS. Patient with spasms but no other complaints.  11/30: NAEON. VSS. Patient with spasms but no other complaints.  12/2: NAEON. VSS. Patient with continued pain and spasms.  12/3: NAEON. VSS. Patient with continued pain and spasms.  12/4:  Patient with lumbar back pain that was different than normal back pain.  Given 5 mg of oxycodone with some pain relief.  Remains afebrile with normal vitals. 12/5: Mr. Wilde reports difficulty sleeping secondary to muscle spasm.  Transitioned to baclofen yesterday with minimal to no improvement in symptoms.  Continues to be afebrile but has been intermittently tachycardic which coincides with increase in pain per patient  12/6  Back pain and muslce spasm improvied, patient able to sleep at night for the first time in 2 days, vanc level 19.9.  12/7:  Patient not in room during initial rounds this AM as he went outside and reports smoking a cigarette.  He continues to complain of muscle spasms that are improved with activity as well as skin irritation in the skin folds in the pelvic area.    12/8:  No acute events overnight.  Patient intermittently tachycardic.  Pain and spasms better controlled.  Skin irritation in pelvic region improved with cream.  No acute events overnight.  Patient intermittently tachycardic.  Pain and spasms better controlled.  Skin irritation in pelvic region improved with cream.     12/9:  Patient off the floor this AM to go outside to smoke.  Patients nurse concern that patient returned sedated.  He continues to complain of muscle spasms.    12/10: No acute events overnight.  Continued spasms while in bed otherwise no complaints  12/11:NAEON. Afebrile and HD stable this AM.  complaining of muscle spasms. Otherwise, no complaints.  12/12:  No acute events.  Vancomycin trough elevated at 23 this AM so second dose of vancomycin ordered to be held.  Continues to have back spasms while in bed which are improved with ambulation        Interval History: No acute events overnight.  Patient reports continued improvement in back spasms with physical activity.      Review of Systems   Constitutional: Negative for chills, fever and unexpected weight change.   HENT: Negative for hearing loss.    Eyes: Negative for visual disturbance.   Respiratory: Negative for shortness of breath.    Cardiovascular: Negative for chest pain.   Gastrointestinal: Negative for abdominal pain, diarrhea, nausea and vomiting.   Genitourinary: Negative for dysuria.   Musculoskeletal: Positive for back pain. Negative for arthralgias.        Back spasms   Skin: Negative for rash.   Neurological: Negative for seizures and numbness.     Objective:     Vital Signs (Most Recent):  Temp: 98.2 °F (36.8 °C) (12/13/17 1159)  Pulse: 82 (12/13/17 1159)  Resp: 18 (12/13/17 1159)  BP: 137/72 (12/13/17 1159)  SpO2: 100 % (12/13/17 1159) Vital Signs (24h Range):  Temp:  [97.5 °F (36.4 °C)-98.7 °F (37.1 °C)] 98.2 °F (36.8 °C)  Pulse:  [] 82  Resp:  [16-18] 18  SpO2:  [96 %-100 %] 100 %  BP: (104-137)/(64-88) 137/72     Weight: 90.2 kg (198 lb 13.7 oz)  Body mass index is 25.53 kg/m².    Intake/Output Summary (Last 24 hours) at 12/13/17 1441  Last data filed at 12/13/17 0745   Gross per 24 hour   Intake              750 ml   Output             2100 ml   Net            -1350 ml      Physical Exam   Constitutional: He is oriented to person,  place, and time. He appears well-developed and well-nourished.   HENT:   Head: Normocephalic and atraumatic.   Eyes: EOM are normal. Pupils are equal, round, and reactive to light.   Neck: No tracheal deviation present. No thyromegaly present.   Cardiovascular: Normal rate.  Exam reveals no gallop and no friction rub.    No murmur heard.  Pulmonary/Chest: Effort normal and breath sounds normal. He has no wheezes. He has no rales.   Abdominal: There is no tenderness. No hernia.   Musculoskeletal: He exhibits tenderness (lumbar spine ). He exhibits no edema.   Neurological: He is alert and oriented to person, place, and time. No cranial nerve deficit or sensory deficit. He exhibits normal muscle tone.   Skin: Skin is warm. No erythema.   Multiple tattoos   Psychiatric: He has a normal mood and affect.       Significant Labs: None    Significant Imaging: No new imaging    Assessment/Plan:      Tobacco use    · Patient reports going outside to smoke  · Counseled patient on smoking cessation, but reports not interested in quitting at this time  · Will have nicotine patch available          History of substance abuse    - Admission drug screen positive for opiates (prescribed to him as an outpatient), but also benzodiazepines, THC, and cocaine  - Not a candidate for PICC placement, so pursuing LTAC placement for long-term IV antibiotics          Discitis of lumbar region    - Stable  - NSGY and ID following, appreciate assistance  - CT lumbar spine concerning for spondylodiscitis at L4-L5, now s/p aspiration with path showing acute and chronic OM, though blood and urine cultures remain negative to date.  - ESR, CRP mildly elevated earlier this admission.   - Procal normal  - HIV Abs negative  - PT/OT following  - Pain control  - Patient on ceftriaxone and vancomycin as ID's recommendation- desire 6-8 weeks of tx- end date Jan 1st at earliest   - Will plan for follow up in NSGY clinic once ABX are done with MRI w/wo  contrast & infectious labs.           Acute bilateral low back pain with bilateral sciatica    - Stable  - Patient with discitis and acute on chronic osteomyelitis of lumbar region. Pain mostly unchanged from yesterday.  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, which complicates our discharge planning and pain control regimen  - Symptomatic treatment with current pain regiment- gabapentin 900mg TID, Ibprofen 600mg Q6, baclofen, lidocaine patch and PRN Norco 10mg-325mg for break-through pain  - Continue to monitor              VTE Risk Mitigation         Ordered     enoxaparin injection 40 mg  Daily     Route:  Subcutaneous        11/20/17 1617     Medium Risk of VTE  Once      11/13/17 1910          Dispo:  Pending LTAC placement or completion of antibiotic course    Mateo Carlson MD  Department of Hospital Medicine   [facility]

## 2017-12-13 NOTE — SUBJECTIVE & OBJECTIVE
Interval History: No acute events overnight.  Patient reports continued improvement in back spasms with physical activity.      Review of Systems   Constitutional: Negative for chills, fever and unexpected weight change.   HENT: Negative for hearing loss.    Eyes: Negative for visual disturbance.   Respiratory: Negative for shortness of breath.    Cardiovascular: Negative for chest pain.   Gastrointestinal: Negative for abdominal pain, diarrhea, nausea and vomiting.   Genitourinary: Negative for dysuria.   Musculoskeletal: Positive for back pain. Negative for arthralgias.        Back spasms   Skin: Negative for rash.   Neurological: Negative for seizures and numbness.     Objective:     Vital Signs (Most Recent):  Temp: 98.2 °F (36.8 °C) (12/13/17 1159)  Pulse: 82 (12/13/17 1159)  Resp: 18 (12/13/17 1159)  BP: 137/72 (12/13/17 1159)  SpO2: 100 % (12/13/17 1159) Vital Signs (24h Range):  Temp:  [97.5 °F (36.4 °C)-98.7 °F (37.1 °C)] 98.2 °F (36.8 °C)  Pulse:  [] 82  Resp:  [16-18] 18  SpO2:  [96 %-100 %] 100 %  BP: (104-137)/(64-88) 137/72     Weight: 90.2 kg (198 lb 13.7 oz)  Body mass index is 25.53 kg/m².    Intake/Output Summary (Last 24 hours) at 12/13/17 1441  Last data filed at 12/13/17 0745   Gross per 24 hour   Intake              750 ml   Output             2100 ml   Net            -1350 ml      Physical Exam   Constitutional: He is oriented to person, place, and time. He appears well-developed and well-nourished.   HENT:   Head: Normocephalic and atraumatic.   Eyes: EOM are normal. Pupils are equal, round, and reactive to light.   Neck: No tracheal deviation present. No thyromegaly present.   Cardiovascular: Normal rate.  Exam reveals no gallop and no friction rub.    No murmur heard.  Pulmonary/Chest: Effort normal and breath sounds normal. He has no wheezes. He has no rales.   Abdominal: There is no tenderness. No hernia.   Musculoskeletal: He exhibits tenderness (lumbar spine ). He exhibits no  edema.   Neurological: He is alert and oriented to person, place, and time. No cranial nerve deficit or sensory deficit. He exhibits normal muscle tone.   Skin: Skin is warm. No erythema.   Multiple tattoos   Psychiatric: He has a normal mood and affect.       Significant Labs: None    Significant Imaging: No new imaging

## 2017-12-13 NOTE — NURSING
Pt awake and alert, resting in bed watching tv, verbalizes no distress or complaints, side rails up times two, bed not at lowest level per patient request, instructed patient to return bed to lowest position before attempting to get out of bed, voiced understanding, call light within reach, personal items to include cell phone within reach

## 2017-12-13 NOTE — PLAN OF CARE
Problem: Infection, Risk/Actual (Adult)  Goal: Identify Related Risk Factors and Signs and Symptoms  Related risk factors and signs and symptoms are identified upon initiation of Human Response Clinical Practice Guideline (CPG)   Outcome: Ongoing (interventions implemented as appropriate)  Pt AOx4; VSS; no acute changes overnight; heat therapy and analgesics provided for pain; no acute distress; will continue to monitor

## 2017-12-13 NOTE — NURSING
Patient returned to unit after being off unit for approximately and hour, dressing to left forearm peripheral IV not to be saturated with blood with blood in adapter tubing, when questioned regarding noted blood, pt states that the IV got caught on his jacket, IV flushed with normal saline and dressing site changed, dressed with tegaderm and wrapped with coban

## 2017-12-14 LAB
ANION GAP SERPL CALC-SCNC: 10 MMOL/L
BASOPHILS # BLD AUTO: 0.03 K/UL
BASOPHILS NFR BLD: 0.7 %
BUN SERPL-MCNC: 11 MG/DL
CALCIUM SERPL-MCNC: 10 MG/DL
CHLORIDE SERPL-SCNC: 104 MMOL/L
CO2 SERPL-SCNC: 28 MMOL/L
CREAT SERPL-MCNC: 0.8 MG/DL
DIFFERENTIAL METHOD: ABNORMAL
EOSINOPHIL # BLD AUTO: 0.2 K/UL
EOSINOPHIL NFR BLD: 3.7 %
ERYTHROCYTE [DISTWIDTH] IN BLOOD BY AUTOMATED COUNT: 14.7 %
EST. GFR  (AFRICAN AMERICAN): >60 ML/MIN/1.73 M^2
EST. GFR  (NON AFRICAN AMERICAN): >60 ML/MIN/1.73 M^2
GLUCOSE SERPL-MCNC: 85 MG/DL
HCT VFR BLD AUTO: 34.9 %
HGB BLD-MCNC: 11 G/DL
IMM GRANULOCYTES # BLD AUTO: 0.01 K/UL
IMM GRANULOCYTES NFR BLD AUTO: 0.2 %
LYMPHOCYTES # BLD AUTO: 1.5 K/UL
LYMPHOCYTES NFR BLD: 36.3 %
MCH RBC QN AUTO: 26.6 PG
MCHC RBC AUTO-ENTMCNC: 31.5 G/DL
MCV RBC AUTO: 84 FL
MONOCYTES # BLD AUTO: 0.9 K/UL
MONOCYTES NFR BLD: 21.5 %
NEUTROPHILS # BLD AUTO: 1.5 K/UL
NEUTROPHILS NFR BLD: 37.6 %
NRBC BLD-RTO: 0 /100 WBC
PLATELET # BLD AUTO: 266 K/UL
PMV BLD AUTO: 9.9 FL
POTASSIUM SERPL-SCNC: 4.4 MMOL/L
RBC # BLD AUTO: 4.14 M/UL
SODIUM SERPL-SCNC: 142 MMOL/L
VANCOMYCIN TROUGH SERPL-MCNC: 14.1 UG/ML
VANCOMYCIN TROUGH SERPL-MCNC: 14.4 UG/ML
WBC # BLD AUTO: 4.1 K/UL

## 2017-12-14 PROCEDURE — 36415 COLL VENOUS BLD VENIPUNCTURE: CPT

## 2017-12-14 PROCEDURE — 97803 MED NUTRITION INDIV SUBSEQ: CPT

## 2017-12-14 PROCEDURE — 85025 COMPLETE CBC W/AUTO DIFF WBC: CPT

## 2017-12-14 PROCEDURE — 25000003 PHARM REV CODE 250: Performed by: INTERNAL MEDICINE

## 2017-12-14 PROCEDURE — 99231 SBSQ HOSP IP/OBS SF/LOW 25: CPT | Mod: ,,, | Performed by: HOSPITALIST

## 2017-12-14 PROCEDURE — 25000003 PHARM REV CODE 250: Performed by: HOSPITALIST

## 2017-12-14 PROCEDURE — 80048 BASIC METABOLIC PNL TOTAL CA: CPT

## 2017-12-14 PROCEDURE — 20600001 HC STEP DOWN PRIVATE ROOM

## 2017-12-14 PROCEDURE — 80202 ASSAY OF VANCOMYCIN: CPT | Mod: 91

## 2017-12-14 PROCEDURE — 63600175 PHARM REV CODE 636 W HCPCS: Performed by: HOSPITALIST

## 2017-12-14 PROCEDURE — 63600175 PHARM REV CODE 636 W HCPCS: Performed by: INTERNAL MEDICINE

## 2017-12-14 PROCEDURE — 25000003 PHARM REV CODE 250: Performed by: STUDENT IN AN ORGANIZED HEALTH CARE EDUCATION/TRAINING PROGRAM

## 2017-12-14 PROCEDURE — 25000003 PHARM REV CODE 250: Performed by: SURGERY

## 2017-12-14 RX ADMIN — CEFEPIME 2 G: 2 INJECTION, POWDER, FOR SOLUTION INTRAVENOUS at 06:12

## 2017-12-14 RX ADMIN — STANDARDIZED SENNA CONCENTRATE AND DOCUSATE SODIUM 1 TABLET: 8.6; 5 TABLET, FILM COATED ORAL at 10:12

## 2017-12-14 RX ADMIN — ENOXAPARIN SODIUM 40 MG: 100 INJECTION SUBCUTANEOUS at 06:12

## 2017-12-14 RX ADMIN — HYDROCODONE BITARTRATE AND ACETAMINOPHEN 1 TABLET: 10; 325 TABLET ORAL at 10:12

## 2017-12-14 RX ADMIN — GABAPENTIN 900 MG: 300 CAPSULE ORAL at 05:12

## 2017-12-14 RX ADMIN — NYSTATIN AND TRIAMCINOLONE ACETONIDE: 100000; 1 CREAM TOPICAL at 10:12

## 2017-12-14 RX ADMIN — BACLOFEN 20 MG: 10 TABLET ORAL at 02:12

## 2017-12-14 RX ADMIN — HYDROCODONE BITARTRATE AND ACETAMINOPHEN 1 TABLET: 10; 325 TABLET ORAL at 02:12

## 2017-12-14 RX ADMIN — GABAPENTIN 900 MG: 300 CAPSULE ORAL at 10:12

## 2017-12-14 RX ADMIN — HYDROCODONE BITARTRATE AND ACETAMINOPHEN 1 TABLET: 10; 325 TABLET ORAL at 06:12

## 2017-12-14 RX ADMIN — BACLOFEN 20 MG: 10 TABLET ORAL at 05:12

## 2017-12-14 RX ADMIN — BACLOFEN 20 MG: 10 TABLET ORAL at 10:12

## 2017-12-14 RX ADMIN — IBUPROFEN 600 MG: 200 TABLET, FILM COATED ORAL at 10:12

## 2017-12-14 RX ADMIN — HYDROCODONE BITARTRATE AND ACETAMINOPHEN 1 TABLET: 10; 325 TABLET ORAL at 05:12

## 2017-12-14 RX ADMIN — IBUPROFEN 600 MG: 200 TABLET, FILM COATED ORAL at 06:12

## 2017-12-14 RX ADMIN — GABAPENTIN 900 MG: 300 CAPSULE ORAL at 02:12

## 2017-12-14 NOTE — PLAN OF CARE
Rajesh spoke with Shanthi with [facility], she has submitted to insurance this am, awaiting insurance approval to either [facility] or [facility].

## 2017-12-14 NOTE — SUBJECTIVE & OBJECTIVE
Interval History: No acute events overnight.  Pain better controlled today    Review of Systems   Constitutional: Negative for chills, fever and unexpected weight change.   HENT: Negative for hearing loss.    Eyes: Negative for visual disturbance.   Respiratory: Negative for shortness of breath.    Cardiovascular: Negative for chest pain.   Gastrointestinal: Negative for abdominal pain, diarrhea, nausea and vomiting.   Genitourinary: Negative for dysuria.   Musculoskeletal: Positive for back pain. Negative for arthralgias.        Back spasms   Skin: Negative for rash.   Neurological: Negative for seizures and numbness.     Objective:     Vital Signs (Most Recent):  Temp: 97.8 °F (36.6 °C) (12/14/17 1140)  Pulse: (!) 124 (12/14/17 1140)  Resp: 18 (12/14/17 1140)  BP: 129/69 (12/14/17 1140)  SpO2: 97 % (12/14/17 1140) Vital Signs (24h Range):  Temp:  [97.7 °F (36.5 °C)-98.7 °F (37.1 °C)] 97.8 °F (36.6 °C)  Pulse:  [] 124  Resp:  [15-18] 18  SpO2:  [97 %-100 %] 97 %  BP: (122-132)/(63-85) 129/69     Weight: 90.2 kg (198 lb 13.7 oz)  Body mass index is 25.53 kg/m².    Intake/Output Summary (Last 24 hours) at 12/14/17 1456  Last data filed at 12/14/17 0400   Gross per 24 hour   Intake              120 ml   Output                0 ml   Net              120 ml      Physical Exam   Constitutional: He is oriented to person, place, and time. He appears well-developed and well-nourished.   HENT:   Head: Normocephalic and atraumatic.   Eyes: EOM are normal. Pupils are equal, round, and reactive to light.   Neck: No tracheal deviation present. No thyromegaly present.   Cardiovascular: Normal rate.  Exam reveals no gallop and no friction rub.    No murmur heard.  Pulmonary/Chest: Effort normal and breath sounds normal. He has no wheezes. He has no rales.   Abdominal: There is no tenderness. No hernia.   Musculoskeletal: He exhibits tenderness (lumbar spine ). He exhibits no edema.   Neurological: He is alert and oriented to  person, place, and time. No cranial nerve deficit or sensory deficit. He exhibits normal muscle tone.   Skin: Skin is warm. No erythema.   Multiple tattoos   Psychiatric: He has a normal mood and affect.       Significant Labs:   Recent Results (from the past 24 hour(s))   VANCOMYCIN, TROUGH before 4th dose    Collection Time: 12/13/17  4:43 PM   Result Value Ref Range    Vancomycin-Trough 19.0 10.0 - 22.0 ug/mL   Basic Metabolic Panel (BMP)    Collection Time: 12/14/17  5:00 AM   Result Value Ref Range    Sodium 142 136 - 145 mmol/L    Potassium 4.4 3.5 - 5.1 mmol/L    Chloride 104 95 - 110 mmol/L    CO2 28 23 - 29 mmol/L    Glucose 85 70 - 110 mg/dL    BUN, Bld 11 6 - 20 mg/dL    Creatinine 0.8 0.5 - 1.4 mg/dL    Calcium 10.0 8.7 - 10.5 mg/dL    Anion Gap 10 8 - 16 mmol/L    eGFR if African American >60.0 >60 mL/min/1.73 m^2    eGFR if non African American >60.0 >60 mL/min/1.73 m^2   CBC auto differential    Collection Time: 12/14/17  5:00 AM   Result Value Ref Range    WBC 4.10 3.90 - 12.70 K/uL    RBC 4.14 (L) 4.60 - 6.20 M/uL    Hemoglobin 11.0 (L) 14.0 - 18.0 g/dL    Hematocrit 34.9 (L) 40.0 - 54.0 %    MCV 84 82 - 98 fL    MCH 26.6 (L) 27.0 - 31.0 pg    MCHC 31.5 (L) 32.0 - 36.0 g/dL    RDW 14.7 (H) 11.5 - 14.5 %    Platelets 266 150 - 350 K/uL    MPV 9.9 9.2 - 12.9 fL    Immature Granulocytes 0.2 0.0 - 0.5 %    Gran # 1.5 (L) 1.8 - 7.7 K/uL    Immature Grans (Abs) 0.01 0.00 - 0.04 K/uL    Lymph # 1.5 1.0 - 4.8 K/uL    Mono # 0.9 0.3 - 1.0 K/uL    Eos # 0.2 0.0 - 0.5 K/uL    Baso # 0.03 0.00 - 0.20 K/uL    nRBC 0 0 /100 WBC    Gran% 37.6 (L) 38.0 - 73.0 %    Lymph% 36.3 18.0 - 48.0 %    Mono% 21.5 (H) 4.0 - 15.0 %    Eosinophil% 3.7 0.0 - 8.0 %    Basophil% 0.7 0.0 - 1.9 %    Differential Method Automated    VANCOMYCIN, TROUGH before 4th dose    Collection Time: 12/14/17 11:25 AM   Result Value Ref Range    Vancomycin-Trough 14.1 10.0 - 22.0 ug/mL   VANCOMYCIN, TROUGH before 4th dose    Collection Time:  12/14/17 11:26 AM   Result Value Ref Range    Vancomycin-Trough 14.4 10.0 - 22.0 ug/mL         Significant Imaging: No new imaging

## 2017-12-14 NOTE — PLAN OF CARE
"CM met with patient in his room to discuss discharge planning. Mr. Wilde informed this CM that he does not want to go to [facility] because " it's far for my mom to drive and she can't see at night." ROSA and Krysten at [facility] notified of above conversation.    Keisha Cadena RN  Ext [phone]  "

## 2017-12-14 NOTE — PLAN OF CARE
Problem: Patient Care Overview  Goal: Plan of Care Review  Outcome: Ongoing (interventions implemented as appropriate)  Pt is AOx4. AVSS throughout the night. PRN hydrocodone administered Q4 per pt request for pain management. IV ABx administered as scheduled. No acute events/changes occurred throughout shift. WCTM.

## 2017-12-14 NOTE — PLAN OF CARE
Sw did provide Pt with disability information and encouraged Pt to apply at [facility] office in [place] and apply online. Sw to follow with LTAC placement.

## 2017-12-14 NOTE — PROGRESS NOTES
[facility] Hospital Medicine  Progress Note    Patient Name: Evgeny Wilde  MRN: [id number]  Patient Class: IP- Inpatient   Admission Date: 11/13/2017  Length of Stay: 31 days  Attending Physician: Sara Arauz MD  Primary Care Provider: Primary Doctor [facility] Medicine Team: [facility] MED 2 Mateo Carlson MD    Subjective:     Principal Problem:Osteomyelitis    HPI:  32 years old male with no significant past medical Hx, present to the ED with progressive lower back pain with sciatica for the last month. He works on constructions, pain start suddenly 1 month ago, at the lower back, dull, aggravated with movements. No inciting factors. Went to [facility] and [facility] ER, he had CT and MRI and they told him he had disk problem and he need neuro follow up and provided with muscle relaxant and narcotic for pain control. Patient stated the medicine is gone and the ain is worse with numbness in his left leg. He is not working for the last month. Denied any trauma, recent infection, surgery, foreign body, recent IV drug abuse. Also denied any fever, mekhi loss, fecal or urine incontinence, saddle anaesthesia.          Hospital Course:  11/14: NSGY following             Blood cultures NGTD, Urine cultures pending.              CT lumbar spine concerning for spondylodiscitis.  11/15: afebrile overnight, blood CX NGTD, NSGY stated no indications for NSGY interventions this admission, they want follow him up as outpatient after after Abx. We will pursue IR for disk Bx/aspirate for Dx.     11/16: NAEON. Vitals stable. Pending disc aspiration via IR tomorrow.  11/17: NAEON. Vitals stable. Planned for L4-L5 biopsy today.  11/18: NAEON. Vitals stable. S/p L4-L5 disc aspiration. Cultures, pathology report pending.  11/19: NAEON. Vitals stable.S/p L4-L5 disc aspiration. Cultures, pathology report pending.  11/20: NAEON. Vitals stable. Pathology showing acute and chronic OM. Cultures remain negative.  11/21:  NAEON. Vitals stable. Patient with trouble sleeping last night with pain in the buttocks and back as well as numbness in the toes. Pt still without adequate pain control.  11/22: NAEON. VSS. Patient with increased pain issues today with pain radiating from the back down to his feet.   11/22: NAEON. VSS. Patient with better pain control today.  11/23: NAEON. VSS. Patient with better pain control today.  11/24: NAEON. VSS. Patient asking for increased frequency of pain meds today.  11/27: NAEON. VSS.   11/28: NAEON. VSS. Patient with spasms still but no other complaints.  11/29: NAEON. VSS. Patient with spasms but no other complaints.  11/30: NAEON. VSS. Patient with spasms but no other complaints.  12/2: NAEON. VSS. Patient with continued pain and spasms.  12/3: NAEON. VSS. Patient with continued pain and spasms.  12/4:  Patient with lumbar back pain that was different than normal back pain.  Given 5 mg of oxycodone with some pain relief.  Remains afebrile with normal vitals. 12/5: Mr. Wilde reports difficulty sleeping secondary to muscle spasm.  Transitioned to baclofen yesterday with minimal to no improvement in symptoms.  Continues to be afebrile but has been intermittently tachycardic which coincides with increase in pain per patient  12/6  Back pain and muslce spasm improvied, patient able to sleep at night for the first time in 2 days, vanc level 19.9.  12/7:  Patient not in room during initial rounds this AM as he went outside and reports smoking a cigarette.  He continues to complain of muscle spasms that are improved with activity as well as skin irritation in the skin folds in the pelvic area.    12/8:  No acute events overnight.  Patient intermittently tachycardic.  Pain and spasms better controlled.  Skin irritation in pelvic region improved with cream.  No acute events overnight.  Patient intermittently tachycardic.  Pain and spasms better controlled.  Skin irritation in pelvic region improved with cream.     12/9:  Patient off the floor this AM to go outside to smoke.  Patients nurse concern that patient returned sedated.  He continues to complain of muscle spasms.    12/10: No acute events overnight.  Continued spasms while in bed otherwise no complaints  12/11:NAEON. Afebrile and HD stable this AM.  complaining of muscle spasms. Otherwise, no complaints.  12/12:  No acute events.  Vancomycin trough elevated at 23 this AM so second dose of vancomycin ordered to be held.  Continues to have back spasms while in bed which are improved with ambulation  12/13:  No acute events overnight.  Patient reports continued improvement in back spasms with physical activity.        Interval History: No acute events overnight.  Pain better controlled today    Review of Systems   Constitutional: Negative for chills, fever and unexpected weight change.   HENT: Negative for hearing loss.    Eyes: Negative for visual disturbance.   Respiratory: Negative for shortness of breath.    Cardiovascular: Negative for chest pain.   Gastrointestinal: Negative for abdominal pain, diarrhea, nausea and vomiting.   Genitourinary: Negative for dysuria.   Musculoskeletal: Positive for back pain. Negative for arthralgias.        Back spasms   Skin: Negative for rash.   Neurological: Negative for seizures and numbness.     Objective:     Vital Signs (Most Recent):  Temp: 97.8 °F (36.6 °C) (12/14/17 1140)  Pulse: (!) 124 (12/14/17 1140)  Resp: 18 (12/14/17 1140)  BP: 129/69 (12/14/17 1140)  SpO2: 97 % (12/14/17 1140) Vital Signs (24h Range):  Temp:  [97.7 °F (36.5 °C)-98.7 °F (37.1 °C)] 97.8 °F (36.6 °C)  Pulse:  [] 124  Resp:  [15-18] 18  SpO2:  [97 %-100 %] 97 %  BP: (122-132)/(63-85) 129/69     Weight: 90.2 kg (198 lb 13.7 oz)  Body mass index is 25.53 kg/m².    Intake/Output Summary (Last 24 hours) at 12/14/17 9471  Last data filed at 12/14/17 0400   Gross per 24 hour   Intake              120 ml   Output                0 ml   Net               120 ml      Physical Exam   Constitutional: He is oriented to person, place, and time. He appears well-developed and well-nourished.   HENT:   Head: Normocephalic and atraumatic.   Eyes: EOM are normal. Pupils are equal, round, and reactive to light.   Neck: No tracheal deviation present. No thyromegaly present.   Cardiovascular: Normal rate.  Exam reveals no gallop and no friction rub.    No murmur heard.  Pulmonary/Chest: Effort normal and breath sounds normal. He has no wheezes. He has no rales.   Abdominal: There is no tenderness. No hernia.   Musculoskeletal: He exhibits tenderness (lumbar spine ). He exhibits no edema.   Neurological: He is alert and oriented to person, place, and time. No cranial nerve deficit or sensory deficit. He exhibits normal muscle tone.   Skin: Skin is warm. No erythema.   Multiple tattoos   Psychiatric: He has a normal mood and affect.       Significant Labs:   Recent Results (from the past 24 hour(s))   VANCOMYCIN, TROUGH before 4th dose    Collection Time: 12/13/17  4:43 PM   Result Value Ref Range    Vancomycin-Trough 19.0 10.0 - 22.0 ug/mL   Basic Metabolic Panel (BMP)    Collection Time: 12/14/17  5:00 AM   Result Value Ref Range    Sodium 142 136 - 145 mmol/L    Potassium 4.4 3.5 - 5.1 mmol/L    Chloride 104 95 - 110 mmol/L    CO2 28 23 - 29 mmol/L    Glucose 85 70 - 110 mg/dL    BUN, Bld 11 6 - 20 mg/dL    Creatinine 0.8 0.5 - 1.4 mg/dL    Calcium 10.0 8.7 - 10.5 mg/dL    Anion Gap 10 8 - 16 mmol/L    eGFR if African American >60.0 >60 mL/min/1.73 m^2    eGFR if non African American >60.0 >60 mL/min/1.73 m^2   CBC auto differential    Collection Time: 12/14/17  5:00 AM   Result Value Ref Range    WBC 4.10 3.90 - 12.70 K/uL    RBC 4.14 (L) 4.60 - 6.20 M/uL    Hemoglobin 11.0 (L) 14.0 - 18.0 g/dL    Hematocrit 34.9 (L) 40.0 - 54.0 %    MCV 84 82 - 98 fL    MCH 26.6 (L) 27.0 - 31.0 pg    MCHC 31.5 (L) 32.0 - 36.0 g/dL    RDW 14.7 (H) 11.5 - 14.5 %    Platelets 266 150 - 350 K/uL     MPV 9.9 9.2 - 12.9 fL    Immature Granulocytes 0.2 0.0 - 0.5 %    Gran # 1.5 (L) 1.8 - 7.7 K/uL    Immature Grans (Abs) 0.01 0.00 - 0.04 K/uL    Lymph # 1.5 1.0 - 4.8 K/uL    Mono # 0.9 0.3 - 1.0 K/uL    Eos # 0.2 0.0 - 0.5 K/uL    Baso # 0.03 0.00 - 0.20 K/uL    nRBC 0 0 /100 WBC    Gran% 37.6 (L) 38.0 - 73.0 %    Lymph% 36.3 18.0 - 48.0 %    Mono% 21.5 (H) 4.0 - 15.0 %    Eosinophil% 3.7 0.0 - 8.0 %    Basophil% 0.7 0.0 - 1.9 %    Differential Method Automated    VANCOMYCIN, TROUGH before 4th dose    Collection Time: 12/14/17 11:25 AM   Result Value Ref Range    Vancomycin-Trough 14.1 10.0 - 22.0 ug/mL   VANCOMYCIN, TROUGH before 4th dose    Collection Time: 12/14/17 11:26 AM   Result Value Ref Range    Vancomycin-Trough 14.4 10.0 - 22.0 ug/mL         Significant Imaging: No new imaging    Assessment/Plan:      Tobacco use    · Patient reports going outside to smoke  · Counseled patient on smoking cessation, but reports not interested in quitting at this time  · Will have nicotine patch available          History of substance abuse    - Admission drug screen positive for opiates (prescribed to him as an outpatient), but also benzodiazepines, THC, and cocaine  - Not a candidate for PICC placement, so pursuing LTAC placement for long-term IV antibiotics          Discitis of lumbar region    - Stable  - NSGY and ID following, appreciate assistance  - CT lumbar spine concerning for spondylodiscitis at L4-L5, now s/p aspiration with path showing acute and chronic OM, though blood and urine cultures remain negative to date.  - ESR, CRP mildly elevated earlier this admission.   - Procal normal  - HIV Abs negative  - PT/OT following  - Pain control  - Patient on ceftriaxone and vancomycin as ID's recommendation- desire 6-8 weeks of tx- end date Jan 1st at earliest   - Will plan for follow up in NSGY clinic once ABX are done with MRI w/wo contrast & infectious labs.           Acute bilateral low back pain with bilateral  sciatica    - Stable  - Patient with discitis and acute on chronic osteomyelitis of lumbar region. Pain mostly unchanged from yesterday.  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

## 2017-12-14 NOTE — PLAN OF CARE
Rajesh did receive update from Shanthi who submitted to insurance for Pt to be accepted to [facility]. [employer] has also submitted to insurance and also [facility], which is Pt's 1st preference. Rajesh uploaded latest clinical information to [facility] via [employer].

## 2017-12-14 NOTE — PLAN OF CARE
Call placed to Krysten at [email or web address] ([phone], ext [phone]) informing this CM that she has not received any submissions for insurance authorization from either [facility] or [facility] at this time, only from [employer]. [email or web address] is currently working on a payor agreement with [employer]. CM/SW will check with patient to see if he is agreeable to [employer]. Will continue to follow.    Keisha Cadena RN  Ext [phone]

## 2017-12-14 NOTE — PROGRESS NOTES
"  [facility]  Adult Nutrition  Consult Note    SUMMARY     Recommendations    1. Continue current regular diet.   2. RD to monitor & follow-up.    Goals: PO intake >50%  Nutrition Goal Status: new  Communication of RD Recs: reviewed with RN    Reason for Assessment    Reason for Assessment: RD follow-up  Diagnosis: other (see comments) (Back pain)  Relevent Medical History: No sign. PMH   Interdisciplinary Rounds: did not attend     General Information Comments: Pt with good appetite, consuming 100% of meals.  Nutrition Discharge Planning: Adequate PO intake    Nutrition Prescription Ordered    Current Diet Order: Regular     Nutrition/Diet History    Patient Reported Diet/Restrictions/Preferences: general     Factors Affecting Nutritional Intake: other (see comments) (None)    Labs/Tests/Procedures/Meds    Pertinent Labs Reviewed: reviewed, pertinent  Pertinent Labs Comments: Stable  Pertinent Medications Reviewed: reviewed, pertinent    Physical Findings    Overall Physical Appearance: nourished  Oral/Mouth Cavity: WDL  Skin: other (see comments) (Incision - back)    Anthropometrics    Height: 6' 2" (188 cm)  Weight Method: Bed Scale  Weight: 90.2 kg (198 lb 13.7 oz)    Ideal Body Weight (IBW), Male: 190 lb  % Ideal Body Weight, Male (lb): 104.66 lb     BMI (Calculated): 25.6  BMI Grade: 25 - 29.9 - overweight    Estimated/Assessed Needs    Weight Used For Calorie Calculations: 90.2 kg (198 lb 13.7 oz)      Energy Calorie Requirements (kcal): 2300 kcal/d  Energy Need Method: Lumpkin-St Jeor (1.2 PAL)     Weight Used For Protein Calculations: 90.2 kg (198 lb 13.7 oz)  Protein Requirements: 90 g/d (1 g/kg)     Fluid Need Method: RDA Method, other (see comments) (Per MD or 1 mL/kcal)    Assessment and Plan    No nutritional dx at this time.    Monitor and Evaluation    Food and Nutrient Intake: energy intake, food and beverage intake  Food and Nutrient Adminstration: diet order     Physical " Activity and Function: nutrition-related ADLs and IADLs  Anthropometric Measurements: weight, weight change  Biochemical Data, Medical Tests and Procedures: lipid profile, gastrointestinal profile, glucose/endocrine profile, inflammatory profile, electrolyte and renal panel  Nutrition-Focused Physical Findings: overall appearance    Nutrition Risk    Level of Risk: other (see comments) (1x/week)    Nutrition Follow-Up    RD Follow-up?: Yes

## 2017-12-15 PROCEDURE — A4216 STERILE WATER/SALINE, 10 ML: HCPCS | Performed by: STUDENT IN AN ORGANIZED HEALTH CARE EDUCATION/TRAINING PROGRAM

## 2017-12-15 PROCEDURE — 25000003 PHARM REV CODE 250: Performed by: STUDENT IN AN ORGANIZED HEALTH CARE EDUCATION/TRAINING PROGRAM

## 2017-12-15 PROCEDURE — 63600175 PHARM REV CODE 636 W HCPCS: Performed by: INTERNAL MEDICINE

## 2017-12-15 PROCEDURE — 25000003 PHARM REV CODE 250: Performed by: HOSPITALIST

## 2017-12-15 PROCEDURE — 20600001 HC STEP DOWN PRIVATE ROOM

## 2017-12-15 PROCEDURE — 99231 SBSQ HOSP IP/OBS SF/LOW 25: CPT | Mod: ,,, | Performed by: HOSPITALIST

## 2017-12-15 PROCEDURE — 63600175 PHARM REV CODE 636 W HCPCS: Performed by: HOSPITALIST

## 2017-12-15 PROCEDURE — 25000003 PHARM REV CODE 250: Performed by: SURGERY

## 2017-12-15 PROCEDURE — 25000003 PHARM REV CODE 250: Performed by: INTERNAL MEDICINE

## 2017-12-15 RX ADMIN — BACLOFEN 20 MG: 10 TABLET ORAL at 01:12

## 2017-12-15 RX ADMIN — STANDARDIZED SENNA CONCENTRATE AND DOCUSATE SODIUM 1 TABLET: 8.6; 5 TABLET, FILM COATED ORAL at 08:12

## 2017-12-15 RX ADMIN — VANCOMYCIN HYDROCHLORIDE 1750 MG: 10 INJECTION, POWDER, LYOPHILIZED, FOR SOLUTION INTRAVENOUS at 12:12

## 2017-12-15 RX ADMIN — VANCOMYCIN HYDROCHLORIDE 1750 MG: 10 INJECTION, POWDER, LYOPHILIZED, FOR SOLUTION INTRAVENOUS at 09:12

## 2017-12-15 RX ADMIN — CEFEPIME 2 G: 2 INJECTION, POWDER, FOR SOLUTION INTRAVENOUS at 07:12

## 2017-12-15 RX ADMIN — IBUPROFEN 600 MG: 200 TABLET, FILM COATED ORAL at 05:12

## 2017-12-15 RX ADMIN — IBUPROFEN 600 MG: 200 TABLET, FILM COATED ORAL at 08:12

## 2017-12-15 RX ADMIN — NYSTATIN AND TRIAMCINOLONE ACETONIDE: 100000; 1 CREAM TOPICAL at 09:12

## 2017-12-15 RX ADMIN — STANDARDIZED SENNA CONCENTRATE AND DOCUSATE SODIUM 1 TABLET: 8.6; 5 TABLET, FILM COATED ORAL at 09:12

## 2017-12-15 RX ADMIN — BACLOFEN 20 MG: 10 TABLET ORAL at 09:12

## 2017-12-15 RX ADMIN — SODIUM CHLORIDE, PRESERVATIVE FREE 5 ML: 5 INJECTION INTRAVENOUS at 05:12

## 2017-12-15 RX ADMIN — BACLOFEN 20 MG: 10 TABLET ORAL at 05:12

## 2017-12-15 RX ADMIN — HYDROCODONE BITARTRATE AND ACETAMINOPHEN 1 TABLET: 10; 325 TABLET ORAL at 01:12

## 2017-12-15 RX ADMIN — ENOXAPARIN SODIUM 40 MG: 100 INJECTION SUBCUTANEOUS at 04:12

## 2017-12-15 RX ADMIN — HYDROCODONE BITARTRATE AND ACETAMINOPHEN 1 TABLET: 10; 325 TABLET ORAL at 05:12

## 2017-12-15 RX ADMIN — GABAPENTIN 900 MG: 300 CAPSULE ORAL at 05:12

## 2017-12-15 RX ADMIN — GABAPENTIN 900 MG: 300 CAPSULE ORAL at 01:12

## 2017-12-15 RX ADMIN — GABAPENTIN 900 MG: 300 CAPSULE ORAL at 09:12

## 2017-12-15 RX ADMIN — VANCOMYCIN HYDROCHLORIDE 1750 MG: 10 INJECTION, POWDER, LYOPHILIZED, FOR SOLUTION INTRAVENOUS at 04:12

## 2017-12-15 RX ADMIN — HYDROCODONE BITARTRATE AND ACETAMINOPHEN 1 TABLET: 10; 325 TABLET ORAL at 09:12

## 2017-12-15 RX ADMIN — IBUPROFEN 600 MG: 200 TABLET, FILM COATED ORAL at 12:12

## 2017-12-15 NOTE — PLAN OF CARE
12/15/17 1459   Discharge Reassessment   Assessment Type Discharge Planning Reassessment   Provided patient/caregiver education on the expected discharge date and the discharge plan Yes   Do you have any problems affording any of your prescribed medications? TBD   Discharge Plan A Long-term acute care facility (LTAC)   Can the patient answer the patient profile reliably? Yes, cognitively intact   How does the patient rate their overall health at the present time? Fair   Describe the patient's ability to walk at the present time. Walks with the help of equipment   How often would a person be available to care for the patient? Whenever needed   During the past month, has the patient often been bothered by feeling down, depressed or hopeless? No   During the past month, has the patient often been bothered by little interest or pleasure in doing things? No

## 2017-12-15 NOTE — PLAN OF CARE
Problem: Patient Care Overview  Goal: Plan of Care Review  Outcome: Ongoing (interventions implemented as appropriate)  Pt VSS. Pt up ad nae with walker; ambulating in halls; remains free of falls. Pt receiving IV antibiotics. Will continue to monitor.     Problem: Pain, Chronic (Adult)  Intervention: Mutually Develop/Implement Persistent Pain Management Plan  Pt receiving pain meds as needed. Motrin prior to Norco. Whiteboard updated with times of nest dose available.

## 2017-12-15 NOTE — SUBJECTIVE & OBJECTIVE
Interval History: No acute events overnight.    Review of Systems   Constitutional: Negative for chills, fever and unexpected weight change.   HENT: Negative for hearing loss.    Eyes: Negative for visual disturbance.   Respiratory: Negative for shortness of breath.    Cardiovascular: Negative for chest pain.   Gastrointestinal: Negative for abdominal pain, diarrhea, nausea and vomiting.   Genitourinary: Negative for dysuria.   Musculoskeletal: Positive for back pain. Negative for arthralgias.        Back spasms that are improving   Skin: Negative for rash.   Neurological: Negative for seizures and numbness.     Objective:     Vital Signs (Most Recent):  Temp: 97.8 °F (36.6 °C) (12/15/17 1200)  Pulse: 105 (12/15/17 1200)  Resp: 18 (12/15/17 1200)  BP: 128/77 (12/15/17 1200)  SpO2: 100 % (12/15/17 1200) Vital Signs (24h Range):  Temp:  [97.8 °F (36.6 °C)-98.8 °F (37.1 °C)] 97.8 °F (36.6 °C)  Pulse:  [] 105  Resp:  [16-18] 18  SpO2:  [99 %-100 %] 100 %  BP: (128-145)/(65-83) 128/77     Weight: 90.2 kg (198 lb 13.7 oz)  Body mass index is 25.53 kg/m².    Intake/Output Summary (Last 24 hours) at 12/15/17 1413  Last data filed at 12/15/17 0600   Gross per 24 hour   Intake                0 ml   Output              500 ml   Net             -500 ml      Physical Exam   Constitutional: He is oriented to person, place, and time. He appears well-developed and well-nourished.   HENT:   Head: Normocephalic and atraumatic.   Eyes: EOM are normal. Pupils are equal, round, and reactive to light.   Neck: No tracheal deviation present. No thyromegaly present.   Cardiovascular: Normal rate.  Exam reveals no gallop and no friction rub.    No murmur heard.  Pulmonary/Chest: Effort normal and breath sounds normal. He has no wheezes. He has no rales.   Abdominal: There is no tenderness. No hernia.   Musculoskeletal: He exhibits tenderness (lumbar spine ). He exhibits no edema.   Neurological: He is alert and oriented to person,  place, and time. No cranial nerve deficit or sensory deficit. He exhibits normal muscle tone.   Skin: Skin is warm. No erythema.   Multiple tattoos   Psychiatric: He has a normal mood and affect.       Significant Labs: None    Significant Imaging: None

## 2017-12-15 NOTE — NURSING
Pt is Aox4. VSS at this time.. PRN hydrocodone administered Q4 per pt request for pain management. IV ABx administered as scheduled. No acute changes occurred throughout shift. WCTM. No other needs or wants.

## 2017-12-15 NOTE — PROGRESS NOTES
[facility] Hospital Medicine  Progress Note    Patient Name: Evgeny Wilde  MRN: [id number]  Patient Class: IP- Inpatient   Admission Date: 11/13/2017  Length of Stay: 32 days  Attending Physician: Sara Arauz MD  Primary Care Provider: Primary Doctor [facility] Medicine Team: [facility] H[facility] MED 2 Mateo Carlson MD    Subjective:     Principal Problem:Osteomyelitis    HPI:  32 years old male with no significant past medical Hx, present to the ED with progressive lower back pain with sciatica for the last month. He works on constructions, pain start suddenly 1 month ago, at the lower back, dull, aggravated with movements. No inciting factors. Went to [facility] and [facility] ER, he had CT and MRI and they told him he had disk problem and he need neuro follow up and provided with muscle relaxant and narcotic for pain control. Patient stated the medicine is gone and the ain is worse with numbness in his left leg. He is not working for the last month. Denied any trauma, recent infection, surgery, foreign body, recent IV drug abuse. Also denied any fever, mekhi loss, fecal or urine incontinence, saddle anaesthesia.          Hospital Course:  11/14: NSGY following             Blood cultures NGTD, Urine cultures pending.              CT lumbar spine concerning for spondylodiscitis.  11/15: afebrile overnight, blood CX NGTD, NSGY stated no indications for NSGY interventions this admission, they want follow him up as outpatient after after Abx. We will pursue IR for disk Bx/aspirate for Dx.     11/16: NAEON. Vitals stable. Pending disc aspiration via IR tomorrow.  11/17: NAEON. Vitals stable. Planned for L4-L5 biopsy today.  11/18: NAEON. Vitals stable. S/p L4-L5 disc aspiration. Cultures, pathology report pending.  11/19: NAEON. Vitals stable.S/p L4-L5 disc aspiration. Cultures, pathology report pending.  11/20: NAEON. Vitals stable. Pathology showing acute and chronic OM. Cultures remain negative.  11/21:  NAEON. Vitals stable. Patient with trouble sleeping last night with pain in the buttocks and back as well as numbness in the toes. Pt still without adequate pain control.  11/22: NAEON. VSS. Patient with increased pain issues today with pain radiating from the back down to his feet.   11/22: NAEON. VSS. Patient with better pain control today.  11/23: NAEON. VSS. Patient with better pain control today.  11/24: NAEON. VSS. Patient asking for increased frequency of pain meds today.  11/27: NAEON. VSS.   11/28: NAEON. VSS. Patient with spasms still but no other complaints.  11/29: NAEON. VSS. Patient with spasms but no other complaints.  11/30: NAEON. VSS. Patient with spasms but no other complaints.  12/2: NAEON. VSS. Patient with continued pain and spasms.  12/3: NAEON. VSS. Patient with continued pain and spasms.  12/4:  Patient with lumbar back pain that was different than normal back pain.  Given 5 mg of oxycodone with some pain relief.  Remains afebrile with normal vitals. 12/5: Mr. Wilde reports difficulty sleeping secondary to muscle spasm.  Transitioned to baclofen yesterday with minimal to no improvement in symptoms.  Continues to be afebrile but has been intermittently tachycardic which coincides with increase in pain per patient  12/6  Back pain and muslce spasm improvied, patient able to sleep at night for the first time in 2 days, vanc level 19.9.  12/7:  Patient not in room during initial rounds this AM as he went outside and reports smoking a cigarette.  He continues to complain of muscle spasms that are improved with activity as well as skin irritation in the skin folds in the pelvic area.    12/8:  No acute events overnight.  Patient intermittently tachycardic.  Pain and spasms better controlled.  Skin irritation in pelvic region improved with cream.  No acute events overnight.  Patient intermittently tachycardic.  Pain and spasms better controlled.  Skin irritation in pelvic region improved with cream.     12/9:  Patient off the floor this AM to go outside to smoke.  Patients nurse concern that patient returned sedated.  He continues to complain of muscle spasms.    12/10: No acute events overnight.  Continued spasms while in bed otherwise no complaints  12/11:NAEON. Afebrile and HD stable this AM.  complaining of muscle spasms. Otherwise, no complaints.  12/12:  No acute events.  Vancomycin trough elevated at 23 this AM so second dose of vancomycin ordered to be held.  Continues to have back spasms while in bed which are improved with ambulation  12/13:  No acute events overnight.  Patient reports continued improvement in back spasms with physical activity.    12/14: No acute events overnight.  Pain better controlled today                      ;     Interval History: No acute events overnight.    Review of Systems   Constitutional: Negative for chills, fever and unexpected weight change.   HENT: Negative for hearing loss.    Eyes: Negative for visual disturbance.   Respiratory: Negative for shortness of breath.    Cardiovascular: Negative for chest pain.   Gastrointestinal: Negative for abdominal pain, diarrhea, nausea and vomiting.   Genitourinary: Negative for dysuria.   Musculoskeletal: Positive for back pain. Negative for arthralgias.        Back spasms that are improving   Skin: Negative for rash.   Neurological: Negative for seizures and numbness.     Objective:     Vital Signs (Most Recent):  Temp: 97.8 °F (36.6 °C) (12/15/17 1200)  Pulse: 105 (12/15/17 1200)  Resp: 18 (12/15/17 1200)  BP: 128/77 (12/15/17 1200)  SpO2: 100 % (12/15/17 1200) Vital Signs (24h Range):  Temp:  [97.8 °F (36.6 °C)-98.8 °F (37.1 °C)] 97.8 °F (36.6 °C)  Pulse:  [] 105  Resp:  [16-18] 18  SpO2:  [99 %-100 %] 100 %  BP: (128-145)/(65-83) 128/77     Weight: 90.2 kg (198 lb 13.7 oz)  Body mass index is 25.53 kg/m².    Intake/Output Summary (Last 24 hours) at 12/15/17 1413  Last data filed at 12/15/17 0600   Gross per 24 hour    Intake                0 ml   Output              500 ml   Net             -500 ml      Physical Exam   Constitutional: He is oriented to person, place, and time. He appears well-developed and well-nourished.   HENT:   Head: Normocephalic and atraumatic.   Eyes: EOM are normal. Pupils are equal, round, and reactive to light.   Neck: No tracheal deviation present. No thyromegaly present.   Cardiovascular: Normal rate.  Exam reveals no gallop and no friction rub.    No murmur heard.  Pulmonary/Chest: Effort normal and breath sounds normal. He has no wheezes. He has no rales.   Abdominal: There is no tenderness. No hernia.   Musculoskeletal: He exhibits tenderness (lumbar spine ). He exhibits no edema.   Neurological: He is alert and oriented to person, place, and time. No cranial nerve deficit or sensory deficit. He exhibits normal muscle tone.   Skin: Skin is warm. No erythema.   Multiple tattoos   Psychiatric: He has a normal mood and affect.       Significant Labs: None    Significant Imaging: None    Assessment/Plan:      Tobacco use    · Patient reports going outside to smoke  · Counseled patient on smoking cessation, but reports not interested in quitting at this time  · Will have nicotine patch available          History of substance abuse    - Admission drug screen positive for opiates (prescribed to him as an outpatient), but also benzodiazepines, THC, and cocaine  - Not a candidate for PICC placement, so pursuing LTAC placement for long-term IV antibiotics          Discitis of lumbar region    - Stable  - NSGY and ID following, appreciate assistance  - CT lumbar spine concerning for spondylodiscitis at L4-L5, now s/p aspiration with path showing acute and chronic OM, though blood and urine cultures remain negative to date.  - ESR, CRP mildly elevated earlier this admission.   - Procal normal  - HIV Abs negative  - PT/OT following  - Pain control  - Patient on ceftriaxone and vancomycin as ID's  recommendation- desire 6-8 weeks of tx- end date Jan 1st at earliest   - Will plan for follow up in NSGY clinic once ABX are done with MRI w/wo contrast & infectious labs.           Acute bilateral low back pain with bilateral sciatica    - Stable  - Patient with discitis and acute on chronic osteomyelitis of lumbar region. Pain mostly unchanged from yesterday.  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, which complicates our discharge planning and pain control regimen  - Symptomatic treatment with current pain regiment- gabapentin 900mg TID, Ibprofen 600mg Q6, baclofen, lidocaine patch and PRN Norco 10mg-325mg for break-through pain  - Continue to monitor              VTE Risk Mitigation         Ordered     enoxaparin injection 40 mg  Daily     Route:  Subcutaneous        11/20/17 1617     Medium Risk of VTE  Once      11/13/17 1910              Mateo Carlson MD  Department of Hospital Medicine   [facility]

## 2017-12-16 LAB
ANION GAP SERPL CALC-SCNC: 10 MMOL/L
BASOPHILS # BLD AUTO: 0.02 K/UL
BASOPHILS NFR BLD: 0.5 %
BUN SERPL-MCNC: 10 MG/DL
CALCIUM SERPL-MCNC: 10 MG/DL
CHLORIDE SERPL-SCNC: 105 MMOL/L
CO2 SERPL-SCNC: 26 MMOL/L
CREAT SERPL-MCNC: 0.8 MG/DL
DIFFERENTIAL METHOD: ABNORMAL
EOSINOPHIL # BLD AUTO: 0.2 K/UL
EOSINOPHIL NFR BLD: 4.2 %
ERYTHROCYTE [DISTWIDTH] IN BLOOD BY AUTOMATED COUNT: 14.7 %
EST. GFR  (AFRICAN AMERICAN): >60 ML/MIN/1.73 M^2
EST. GFR  (NON AFRICAN AMERICAN): >60 ML/MIN/1.73 M^2
GLUCOSE SERPL-MCNC: 99 MG/DL
HCT VFR BLD AUTO: 35 %
HGB BLD-MCNC: 11.1 G/DL
IMM GRANULOCYTES # BLD AUTO: 0.01 K/UL
IMM GRANULOCYTES NFR BLD AUTO: 0.2 %
LYMPHOCYTES # BLD AUTO: 1.7 K/UL
LYMPHOCYTES NFR BLD: 40.1 %
MCH RBC QN AUTO: 26.8 PG
MCHC RBC AUTO-ENTMCNC: 31.7 G/DL
MCV RBC AUTO: 85 FL
MONOCYTES # BLD AUTO: 0.8 K/UL
MONOCYTES NFR BLD: 17.4 %
NEUTROPHILS # BLD AUTO: 1.6 K/UL
NEUTROPHILS NFR BLD: 37.6 %
NRBC BLD-RTO: 0 /100 WBC
PLATELET # BLD AUTO: 272 K/UL
PMV BLD AUTO: 10.1 FL
POTASSIUM SERPL-SCNC: 4.2 MMOL/L
RBC # BLD AUTO: 4.14 M/UL
SODIUM SERPL-SCNC: 141 MMOL/L
VANCOMYCIN TROUGH SERPL-MCNC: 21.8 UG/ML
WBC # BLD AUTO: 4.31 K/UL

## 2017-12-16 PROCEDURE — 63600175 PHARM REV CODE 636 W HCPCS: Performed by: INTERNAL MEDICINE

## 2017-12-16 PROCEDURE — 85025 COMPLETE CBC W/AUTO DIFF WBC: CPT

## 2017-12-16 PROCEDURE — 25000003 PHARM REV CODE 250: Performed by: SURGERY

## 2017-12-16 PROCEDURE — 63600175 PHARM REV CODE 636 W HCPCS: Performed by: HOSPITALIST

## 2017-12-16 PROCEDURE — 20600001 HC STEP DOWN PRIVATE ROOM

## 2017-12-16 PROCEDURE — 99231 SBSQ HOSP IP/OBS SF/LOW 25: CPT | Mod: ,,, | Performed by: HOSPITALIST

## 2017-12-16 PROCEDURE — 80202 ASSAY OF VANCOMYCIN: CPT

## 2017-12-16 PROCEDURE — 25000003 PHARM REV CODE 250: Performed by: STUDENT IN AN ORGANIZED HEALTH CARE EDUCATION/TRAINING PROGRAM

## 2017-12-16 PROCEDURE — 36415 COLL VENOUS BLD VENIPUNCTURE: CPT

## 2017-12-16 PROCEDURE — 25000003 PHARM REV CODE 250: Performed by: INTERNAL MEDICINE

## 2017-12-16 PROCEDURE — 80048 BASIC METABOLIC PNL TOTAL CA: CPT

## 2017-12-16 PROCEDURE — 25000003 PHARM REV CODE 250: Performed by: HOSPITALIST

## 2017-12-16 RX ADMIN — VANCOMYCIN HYDROCHLORIDE 1750 MG: 10 INJECTION, POWDER, LYOPHILIZED, FOR SOLUTION INTRAVENOUS at 12:12

## 2017-12-16 RX ADMIN — IBUPROFEN 600 MG: 200 TABLET, FILM COATED ORAL at 01:12

## 2017-12-16 RX ADMIN — BACLOFEN 20 MG: 10 TABLET ORAL at 09:12

## 2017-12-16 RX ADMIN — IBUPROFEN 600 MG: 200 TABLET, FILM COATED ORAL at 03:12

## 2017-12-16 RX ADMIN — GABAPENTIN 900 MG: 300 CAPSULE ORAL at 09:12

## 2017-12-16 RX ADMIN — IBUPROFEN 600 MG: 200 TABLET, FILM COATED ORAL at 07:12

## 2017-12-16 RX ADMIN — GABAPENTIN 900 MG: 300 CAPSULE ORAL at 02:12

## 2017-12-16 RX ADMIN — BACLOFEN 20 MG: 10 TABLET ORAL at 02:12

## 2017-12-16 RX ADMIN — BACLOFEN 20 MG: 10 TABLET ORAL at 05:12

## 2017-12-16 RX ADMIN — HYDROCODONE BITARTRATE AND ACETAMINOPHEN 1 TABLET: 10; 325 TABLET ORAL at 02:12

## 2017-12-16 RX ADMIN — ENOXAPARIN SODIUM 40 MG: 100 INJECTION SUBCUTANEOUS at 05:12

## 2017-12-16 RX ADMIN — GABAPENTIN 900 MG: 300 CAPSULE ORAL at 05:12

## 2017-12-16 RX ADMIN — CEFEPIME 2 G: 2 INJECTION, POWDER, FOR SOLUTION INTRAVENOUS at 07:12

## 2017-12-16 RX ADMIN — STANDARDIZED SENNA CONCENTRATE AND DOCUSATE SODIUM 1 TABLET: 8.6; 5 TABLET, FILM COATED ORAL at 08:12

## 2017-12-16 RX ADMIN — HYDROCODONE BITARTRATE AND ACETAMINOPHEN 1 TABLET: 10; 325 TABLET ORAL at 05:12

## 2017-12-16 RX ADMIN — HYDROCODONE BITARTRATE AND ACETAMINOPHEN 1 TABLET: 10; 325 TABLET ORAL at 08:12

## 2017-12-16 RX ADMIN — HYDROCODONE BITARTRATE AND ACETAMINOPHEN 1 TABLET: 10; 325 TABLET ORAL at 12:12

## 2017-12-16 RX ADMIN — VANCOMYCIN HYDROCHLORIDE 1750 MG: 10 INJECTION, POWDER, LYOPHILIZED, FOR SOLUTION INTRAVENOUS at 08:12

## 2017-12-16 RX ADMIN — VANCOMYCIN HYDROCHLORIDE 1750 MG: 10 INJECTION, POWDER, LYOPHILIZED, FOR SOLUTION INTRAVENOUS at 04:12

## 2017-12-16 NOTE — PLAN OF CARE
Problem: Patient Care Overview  Goal: Plan of Care Review  Outcome: Ongoing (interventions implemented as appropriate)  Pt aaox4, VSS, up ad nae with assistance of walker. Pt walks in halls and off unit to smoke. No acute changes. Complaints of pain, PRN pain meds admin. Free from injuries/falls. WCTM.

## 2017-12-16 NOTE — PROGRESS NOTES
[facility] Hospital Medicine  Progress Note    Patient Name: Evgeny Wilde  MRN: [id number]  Patient Class: IP- Inpatient   Admission Date: 11/13/2017  Length of Stay: 33 days  Attending Physician: Sara Arauz MD  Primary Care Provider: Primary Doctor [facility] Medicine Team: [facility] HOSP MED 2 Marian Cedillo MD    Subjective:     Principal Problem:Osteomyelitis    HPI:  32 years old male with no significant past medical Hx, present to the ED with progressive lower back pain with sciatica for the last month. He works on constructions, pain start suddenly 1 month ago, at the lower back, dull, aggravated with movements. No inciting factors. Went to [facility] and [facility] ER, he had CT and MRI and they told him he had disk problem and he need neuro follow up and provided with muscle relaxant and narcotic for pain control. Patient stated the medicine is gone and the ain is worse with numbness in his left leg. He is not working for the last month. Denied any trauma, recent infection, surgery, foreign body, recent IV drug abuse. Also denied any fever, mekhi loss, fecal or urine incontinence, saddle anaesthesia.          Hospital Course:  11/14: NSGY following             Blood cultures NGTD, Urine cultures pending.              CT lumbar spine concerning for spondylodiscitis.  11/15: afebrile overnight, blood CX NGTD, NSGY stated no indications for NSGY interventions this admission, they want follow him up as outpatient after after Abx. We will pursue IR for disk Bx/aspirate for Dx.     11/16: NAEON. Vitals stable. Pending disc aspiration via IR tomorrow.  11/17: NAEON. Vitals stable. Planned for L4-L5 biopsy today.  11/18: NAEON. Vitals stable. S/p L4-L5 disc aspiration. Cultures, pathology report pending.  11/19: NAEON. Vitals stable.S/p L4-L5 disc aspiration. Cultures, pathology report pending.  11/20: NAEON. Vitals stable. Pathology showing acute and chronic OM. Cultures remain  negative.  11/21: NAEON. Vitals stable. Patient with trouble sleeping last night with pain in the buttocks and back as well as numbness in the toes. Pt still without adequate pain control.  11/22: NAEON. VSS. Patient with increased pain issues today with pain radiating from the back down to his feet.   11/22: NAEON. VSS. Patient with better pain control today.  11/23: NAEON. VSS. Patient with better pain control today.  11/24: NAEON. VSS. Patient asking for increased frequency of pain meds today.  11/27: NAEON. VSS.   11/28: NAEON. VSS. Patient with spasms still but no other complaints.  11/29: NAEON. VSS. Patient with spasms but no other complaints.  11/30: NAEON. VSS. Patient with spasms but no other complaints.  12/2: NAEON. VSS. Patient with continued pain and spasms.  12/3: NAEON. VSS. Patient with continued pain and spasms.  12/4:  Patient with lumbar back pain that was different than normal back pain.  Given 5 mg of oxycodone with some pain relief.  Remains afebrile with normal vitals. 12/5: Mr. Wilde reports difficulty sleeping secondary to muscle spasm.  Transitioned to baclofen yesterday with minimal to no improvement in symptoms.  Continues to be afebrile but has been intermittently tachycardic which coincides with increase in pain per patient  12/6  Back pain and muslce spasm improvied, patient able to sleep at night for the first time in 2 days, vanc level 19.9.  12/7:  Patient not in room during initial rounds this AM as he went outside and reports smoking a cigarette.  He continues to complain of muscle spasms that are improved with activity as well as skin irritation in the skin folds in the pelvic area.    12/8:  No acute events overnight.  Patient intermittently tachycardic.  Pain and spasms better controlled.  Skin irritation in pelvic region improved with cream.  No acute events overnight.  Patient intermittently tachycardic.  Pain and spasms better controlled.  Skin irritation in pelvic region  improved with cream.    12/9:  Patient off the floor this AM to go outside to smoke.  Patients nurse concern that patient returned sedated.  He continues to complain of muscle spasms.    12/10: No acute events overnight.  Continued spasms while in bed otherwise no complaints  12/11:NAEON. Afebrile and HD stable this AM.  complaining of muscle spasms. Otherwise, no complaints.  12/12:  No acute events.  Vancomycin trough elevated at 23 this AM so second dose of vancomycin ordered to be held.  Continues to have back spasms while in bed which are improved with ambulation  12/13:  No acute events overnight.  Patient reports continued improvement in back spasms with physical activity.    12/14: No acute events overnight.  Pain better controlled today                      ;     Interval History: No acute events overnight.    Review of Systems   Constitutional: Negative for chills, fever and unexpected weight change.   HENT: Negative for hearing loss.    Eyes: Negative for visual disturbance.   Respiratory: Negative for shortness of breath.    Cardiovascular: Negative for chest pain.   Gastrointestinal: Negative for abdominal pain, diarrhea, nausea and vomiting.   Genitourinary: Negative for dysuria.   Musculoskeletal: Positive for back pain. Negative for arthralgias.        Back spasms that are improving   Skin: Negative for rash.   Neurological: Negative for seizures and numbness.     Objective:     Vital Signs (Most Recent):  Temp: 98.2 °F (36.8 °C) (12/16/17 1122)  Pulse: 69 (12/16/17 1122)  Resp: 16 (12/16/17 1122)  BP: (!) 114/56 (12/16/17 1122)  SpO2: 100 % (12/16/17 1122) Vital Signs (24h Range):  Temp:  [97.5 °F (36.4 °C)-98.7 °F (37.1 °C)] 98.2 °F (36.8 °C)  Pulse:  [] 69  Resp:  [16-18] 16  SpO2:  [97 %-100 %] 100 %  BP: (114-132)/(56-82) 114/56     Weight: 90.2 kg (198 lb 13.7 oz)  Body mass index is 25.53 kg/m².    Intake/Output Summary (Last 24 hours) at 12/16/17 7764  Last data filed at 12/16/17 4126    Gross per 24 hour   Intake             1980 ml   Output                0 ml   Net             1980 ml      Physical Exam   Constitutional: He is oriented to person, place, and time. He appears well-developed and well-nourished.   HENT:   Head: Normocephalic and atraumatic.   Eyes: EOM are normal. Pupils are equal, round, and reactive to light.   Neck: No tracheal deviation present. No thyromegaly present.   Cardiovascular: Normal rate.  Exam reveals no gallop and no friction rub.    No murmur heard.  Pulmonary/Chest: Effort normal and breath sounds normal. He has no wheezes. He has no rales.   Abdominal: There is no tenderness. No hernia.   Musculoskeletal: He exhibits tenderness (lumbar spine ). He exhibits no edema.   Neurological: He is alert and oriented to person, place, and time. No cranial nerve deficit or sensory deficit. He exhibits normal muscle tone.   Skin: Skin is warm. No erythema.   Multiple tattoos   Psychiatric: He has a normal mood and affect.       Significant Labs: None    Significant Imaging: None    Assessment/Plan:      Tobacco use    · Patient reports going outside to smoke  · Counseled patient on smoking cessation, but reports not interested in quitting at this time  · Will have nicotine patch available          History of substance abuse    - Admission drug screen positive for opiates (prescribed to him as an outpatient), but also benzodiazepines, THC, and cocaine  - Not a candidate for PICC placement, so pursuing LTAC placement for long-term IV antibiotics          Discitis of lumbar region    - Stable  - NSGY and ID following, appreciate assistance  - CT lumbar spine concerning for spondylodiscitis at L4-L5, now s/p aspiration with path showing acute and chronic OM, though blood and urine cultures remain negative to date.  - ESR, CRP mildly elevated earlier this admission.   - Procal normal  - HIV Abs negative  - PT/OT following  - Pain control  - Patient on ceftriaxone and vancomycin as  ID's recommendation- desire 6-8 weeks of tx- end date Jan 1st at earliest   - Will plan for follow up in NSGY clinic once ABX are done with MRI w/wo contrast & infectious labs.           Acute bilateral low back pain with bilateral sciatica    - Stable  - Patient with discitis and acute on chronic osteomyelitis of lumbar region. Pain mostly unchanged from yesterday.  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, which complicates our discharge planning and pain control regimen  - Symptomatic treatment with current pain regiment- gabapentin 900mg TID, Ibprofen 600mg Q6, baclofen, lidocaine patch and PRN Norco 10mg-325mg for break-through pain  - Continue to monitor              VTE Risk Mitigation         Ordered     enoxaparin injection 40 mg  Daily     Route:  Subcutaneous        11/20/17 1617     Medium Risk of VTE  Once      11/13/17 1910              Marian Cedillo MD  Department of Hospital Medicine   [facility]

## 2017-12-16 NOTE — SUBJECTIVE & OBJECTIVE
Interval History: No acute events overnight.    Review of Systems   Constitutional: Negative for chills, fever and unexpected weight change.   HENT: Negative for hearing loss.    Eyes: Negative for visual disturbance.   Respiratory: Negative for shortness of breath.    Cardiovascular: Negative for chest pain.   Gastrointestinal: Negative for abdominal pain, diarrhea, nausea and vomiting.   Genitourinary: Negative for dysuria.   Musculoskeletal: Positive for back pain. Negative for arthralgias.        Back spasms that are improving   Skin: Negative for rash.   Neurological: Negative for seizures and numbness.     Objective:     Vital Signs (Most Recent):  Temp: 98.2 °F (36.8 °C) (12/16/17 1122)  Pulse: 69 (12/16/17 1122)  Resp: 16 (12/16/17 1122)  BP: (!) 114/56 (12/16/17 1122)  SpO2: 100 % (12/16/17 1122) Vital Signs (24h Range):  Temp:  [97.5 °F (36.4 °C)-98.7 °F (37.1 °C)] 98.2 °F (36.8 °C)  Pulse:  [] 69  Resp:  [16-18] 16  SpO2:  [97 %-100 %] 100 %  BP: (114-132)/(56-82) 114/56     Weight: 90.2 kg (198 lb 13.7 oz)  Body mass index is 25.53 kg/m².    Intake/Output Summary (Last 24 hours) at 12/16/17 1226  Last data filed at 12/16/17 0545   Gross per 24 hour   Intake             1980 ml   Output                0 ml   Net             1980 ml      Physical Exam   Constitutional: He is oriented to person, place, and time. He appears well-developed and well-nourished.   HENT:   Head: Normocephalic and atraumatic.   Eyes: EOM are normal. Pupils are equal, round, and reactive to light.   Neck: No tracheal deviation present. No thyromegaly present.   Cardiovascular: Normal rate.  Exam reveals no gallop and no friction rub.    No murmur heard.  Pulmonary/Chest: Effort normal and breath sounds normal. He has no wheezes. He has no rales.   Abdominal: There is no tenderness. No hernia.   Musculoskeletal: He exhibits tenderness (lumbar spine ). He exhibits no edema.   Neurological: He is alert and oriented to person,  place, and time. No cranial nerve deficit or sensory deficit. He exhibits normal muscle tone.   Skin: Skin is warm. No erythema.   Multiple tattoos   Psychiatric: He has a normal mood and affect.       Significant Labs: None    Significant Imaging: None

## 2017-12-16 NOTE — PLAN OF CARE
Problem: Patient Care Overview  Goal: Plan of Care Review  PT AAOx4, vs stable, iv access positional, dressing changed,vancomycin given, c/o lower back pain radiating to shoulder,ibuprophen given, skin intact, ambulates independently, will be here until antibiotic administration done, no falls or complication throughout shift, will continue to monitor

## 2017-12-17 PROCEDURE — 25000003 PHARM REV CODE 250: Performed by: SURGERY

## 2017-12-17 PROCEDURE — 25000003 PHARM REV CODE 250: Performed by: STUDENT IN AN ORGANIZED HEALTH CARE EDUCATION/TRAINING PROGRAM

## 2017-12-17 PROCEDURE — 20600001 HC STEP DOWN PRIVATE ROOM

## 2017-12-17 PROCEDURE — 63600175 PHARM REV CODE 636 W HCPCS: Performed by: INTERNAL MEDICINE

## 2017-12-17 PROCEDURE — 63600175 PHARM REV CODE 636 W HCPCS: Performed by: HOSPITALIST

## 2017-12-17 PROCEDURE — 25000003 PHARM REV CODE 250: Performed by: HOSPITALIST

## 2017-12-17 PROCEDURE — 99231 SBSQ HOSP IP/OBS SF/LOW 25: CPT | Mod: ,,, | Performed by: HOSPITALIST

## 2017-12-17 PROCEDURE — 25000003 PHARM REV CODE 250: Performed by: INTERNAL MEDICINE

## 2017-12-17 RX ADMIN — HYDROCODONE BITARTRATE AND ACETAMINOPHEN 1 TABLET: 10; 325 TABLET ORAL at 07:12

## 2017-12-17 RX ADMIN — HYDROCODONE BITARTRATE AND ACETAMINOPHEN 1 TABLET: 10; 325 TABLET ORAL at 04:12

## 2017-12-17 RX ADMIN — VANCOMYCIN HYDROCHLORIDE 1750 MG: 10 INJECTION, POWDER, LYOPHILIZED, FOR SOLUTION INTRAVENOUS at 09:12

## 2017-12-17 RX ADMIN — IBUPROFEN 600 MG: 200 TABLET, FILM COATED ORAL at 05:12

## 2017-12-17 RX ADMIN — GABAPENTIN 900 MG: 300 CAPSULE ORAL at 11:12

## 2017-12-17 RX ADMIN — CEFEPIME 2 G: 2 INJECTION, POWDER, FOR SOLUTION INTRAVENOUS at 07:12

## 2017-12-17 RX ADMIN — HYDROCODONE BITARTRATE AND ACETAMINOPHEN 1 TABLET: 10; 325 TABLET ORAL at 02:12

## 2017-12-17 RX ADMIN — BACLOFEN 20 MG: 10 TABLET ORAL at 11:12

## 2017-12-17 RX ADMIN — ENOXAPARIN SODIUM 40 MG: 100 INJECTION SUBCUTANEOUS at 05:12

## 2017-12-17 RX ADMIN — GABAPENTIN 900 MG: 300 CAPSULE ORAL at 05:12

## 2017-12-17 RX ADMIN — IBUPROFEN 600 MG: 200 TABLET, FILM COATED ORAL at 01:12

## 2017-12-17 RX ADMIN — STANDARDIZED SENNA CONCENTRATE AND DOCUSATE SODIUM 1 TABLET: 8.6; 5 TABLET, FILM COATED ORAL at 11:12

## 2017-12-17 RX ADMIN — VANCOMYCIN HYDROCHLORIDE 1750 MG: 10 INJECTION, POWDER, LYOPHILIZED, FOR SOLUTION INTRAVENOUS at 12:12

## 2017-12-17 RX ADMIN — BACLOFEN 20 MG: 10 TABLET ORAL at 05:12

## 2017-12-17 RX ADMIN — IBUPROFEN 600 MG: 200 TABLET, FILM COATED ORAL at 11:12

## 2017-12-17 RX ADMIN — HYDROCODONE BITARTRATE AND ACETAMINOPHEN 1 TABLET: 10; 325 TABLET ORAL at 12:12

## 2017-12-17 RX ADMIN — GABAPENTIN 900 MG: 300 CAPSULE ORAL at 02:12

## 2017-12-17 RX ADMIN — BACLOFEN 20 MG: 10 TABLET ORAL at 02:12

## 2017-12-17 RX ADMIN — HYDROCODONE BITARTRATE AND ACETAMINOPHEN 1 TABLET: 10; 325 TABLET ORAL at 09:12

## 2017-12-17 RX ADMIN — VANCOMYCIN HYDROCHLORIDE 1750 MG: 10 INJECTION, POWDER, LYOPHILIZED, FOR SOLUTION INTRAVENOUS at 05:12

## 2017-12-17 NOTE — PLAN OF CARE
Problem: Pain, Acute (Adult)  Intervention: Mutually Develop/Implement Acute Pain Management Plan   12/17/17 3494   Pain/Comfort/Sleep Interventions   Pain Management Interventions awakened for pain meds per patient request;care clustered;heat applied;medication;pain management plan reviewed with patient/caregiver   Cognitive Interventions   Sensory Stimulation Regulation care clustered;quiet environment promoted

## 2017-12-17 NOTE — PLAN OF CARE
Problem: Patient Care Overview  Goal: Plan of Care Review  Outcome: Ongoing (interventions implemented as appropriate)  Pt aaox4, VSS, up ad nae. PRN pain meds and heat packs admin for complaints of pain. No acute changes over night. No other complaints at this time, [facility]   12/17/17 8547   Coping/Psychosocial   Plan Of Care Reviewed With patient

## 2017-12-17 NOTE — SUBJECTIVE & OBJECTIVE
Interval History: No acute events.  Patient walking the lau with walker.     Review of Systems   Constitutional: Negative for chills, fever and unexpected weight change.   HENT: Negative for hearing loss.    Eyes: Negative for visual disturbance.   Respiratory: Negative for shortness of breath.    Cardiovascular: Negative for chest pain.   Gastrointestinal: Negative for abdominal pain, diarrhea, nausea and vomiting.   Genitourinary: Negative for dysuria.   Musculoskeletal: Positive for back pain. Negative for arthralgias.        Back spasms that are improving   Skin: Negative for rash.   Neurological: Negative for seizures and numbness.     Objective:     Vital Signs (Most Recent):  Temp: 98.4 °F (36.9 °C) (12/17/17 0738)  Pulse: 97 (12/17/17 0738)  Resp: 18 (12/17/17 0738)  BP: (!) 130/26 (12/17/17 0738)  SpO2: 98 % (12/17/17 0738) Vital Signs (24h Range):  Temp:  [96.5 °F (35.8 °C)-98.4 °F (36.9 °C)] 98.4 °F (36.9 °C)  Pulse:  [] 97  Resp:  [16-20] 18  SpO2:  [97 %-100 %] 98 %  BP: (114-130)/(26-83) 130/26     Weight: 90.2 kg (198 lb 13.7 oz)  Body mass index is 25.53 kg/m².    Intake/Output Summary (Last 24 hours) at 12/17/17 1028  Last data filed at 12/17/17 0921   Gross per 24 hour   Intake              490 ml   Output              700 ml   Net             -210 ml      Physical Exam   Constitutional: He is oriented to person, place, and time. He appears well-developed and well-nourished.   HENT:   Head: Normocephalic and atraumatic.   Eyes: EOM are normal. Pupils are equal, round, and reactive to light.   Neck: No tracheal deviation present. No thyromegaly present.   Cardiovascular: Normal rate.  Exam reveals no gallop and no friction rub.    No murmur heard.  Pulmonary/Chest: Effort normal and breath sounds normal. He has no wheezes. He has no rales.   Abdominal: There is no tenderness. No hernia.   Musculoskeletal: He exhibits tenderness (lumbar spine ). He exhibits no edema.   Neurological: He is  alert and oriented to person, place, and time. No cranial nerve deficit or sensory deficit. He exhibits normal muscle tone.   Skin: Skin is warm. No erythema.   Multiple tattoos   Psychiatric: He has a normal mood and affect.       Significant Labs: None    Significant Imaging: No new imaging

## 2017-12-17 NOTE — PROGRESS NOTES
[facility] Hospital Medicine  Progress Note    Patient Name: Evgeny Wilde  MRN: [id number]  Patient Class: IP- Inpatient   Admission Date: 11/13/2017  Length of Stay: 34 days  Attending Physician: Sara Arauz MD  Primary Care Provider: Primary Doctor [facility] Medicine Team: [facility] HOSP MED 2 Mateo Carlson MD    Subjective:     Principal Problem:Osteomyelitis    HPI:  32 years old male with no significant past medical Hx, present to the ED with progressive lower back pain with sciatica for the last month. He works on constructions, pain start suddenly 1 month ago, at the lower back, dull, aggravated with movements. No inciting factors. Went to [facility] and [facility] ER, he had CT and MRI and they told him he had disk problem and he need neuro follow up and provided with muscle relaxant and narcotic for pain control. Patient stated the medicine is gone and the ain is worse with numbness in his left leg. He is not working for the last month. Denied any trauma, recent infection, surgery, foreign body, recent IV drug abuse. Also denied any fever, mekhi loss, fecal or urine incontinence, saddle anaesthesia.          Hospital Course:  11/14: NSGY following             Blood cultures NGTD, Urine cultures pending.              CT lumbar spine concerning for spondylodiscitis.  11/15: afebrile overnight, blood CX NGTD, NSGY stated no indications for NSGY interventions this admission, they want follow him up as outpatient after after Abx. We will pursue IR for disk Bx/aspirate for Dx.     11/16: NAEON. Vitals stable. Pending disc aspiration via IR tomorrow.  11/17: NAEON. Vitals stable. Planned for L4-L5 biopsy today.  11/18: NAEON. Vitals stable. S/p L4-L5 disc aspiration. Cultures, pathology report pending.  11/19: NAEON. Vitals stable.S/p L4-L5 disc aspiration. Cultures, pathology report pending.  11/20: NAEON. Vitals stable. Pathology showing acute and chronic OM. Cultures remain negative.  11/21:  NAEON. Vitals stable. Patient with trouble sleeping last night with pain in the buttocks and back as well as numbness in the toes. Pt still without adequate pain control.  11/22: NAEON. VSS. Patient with increased pain issues today with pain radiating from the back down to his feet.   11/22: NAEON. VSS. Patient with better pain control today.  11/23: NAEON. VSS. Patient with better pain control today.  11/24: NAEON. VSS. Patient asking for increased frequency of pain meds today.  11/27: NAEON. VSS.   11/28: NAEON. VSS. Patient with spasms still but no other complaints.  11/29: NAEON. VSS. Patient with spasms but no other complaints.  11/30: NAEON. VSS. Patient with spasms but no other complaints.  12/2: NAEON. VSS. Patient with continued pain and spasms.  12/3: NAEON. VSS. Patient with continued pain and spasms.  12/4:  Patient with lumbar back pain that was different than normal back pain.  Given 5 mg of oxycodone with some pain relief.  Remains afebrile with normal vitals. 12/5: Mr. Wilde reports difficulty sleeping secondary to muscle spasm.  Transitioned to baclofen yesterday with minimal to no improvement in symptoms.  Continues to be afebrile but has been intermittently tachycardic which coincides with increase in pain per patient  12/6  Back pain and muslce spasm improvied, patient able to sleep at night for the first time in 2 days, vanc level 19.9.  12/7:  Patient not in room during initial rounds this AM as he went outside and reports smoking a cigarette.  He continues to complain of muscle spasms that are improved with activity as well as skin irritation in the skin folds in the pelvic area.    12/8:  No acute events overnight.  Patient intermittently tachycardic.  Pain and spasms better controlled.  Skin irritation in pelvic region improved with cream.  No acute events overnight.  Patient intermittently tachycardic.  Pain and spasms better controlled.  Skin irritation in pelvic region improved with cream.     12/9:  Patient off the floor this AM to go outside to smoke.  Patients nurse concern that patient returned sedated.  He continues to complain of muscle spasms.    12/10: No acute events overnight.  Continued spasms while in bed otherwise no complaints  12/11:NAEON. Afebrile and HD stable this AM.  complaining of muscle spasms. Otherwise, no complaints.  12/12:  No acute events.  Vancomycin trough elevated at 23 this AM so second dose of vancomycin ordered to be held.  Continues to have back spasms while in bed which are improved with ambulation  12/13:  No acute events overnight.  Patient reports continued improvement in back spasms with physical activity.    12/14: No acute events overnight.  Pain better controlled today  12/15-12/16: No acute events    Interval History: No acute events.  Patient walking the lau with walker.     Review of Systems   Constitutional: Negative for chills, fever and unexpected weight change.   HENT: Negative for hearing loss.    Eyes: Negative for visual disturbance.   Respiratory: Negative for shortness of breath.    Cardiovascular: Negative for chest pain.   Gastrointestinal: Negative for abdominal pain, diarrhea, nausea and vomiting.   Genitourinary: Negative for dysuria.   Musculoskeletal: Positive for back pain. Negative for arthralgias.        Back spasms that are improving   Skin: Negative for rash.   Neurological: Negative for seizures and numbness.     Objective:     Vital Signs (Most Recent):  Temp: 98.4 °F (36.9 °C) (12/17/17 0738)  Pulse: 97 (12/17/17 0738)  Resp: 18 (12/17/17 0738)  BP: (!) 130/26 (12/17/17 0738)  SpO2: 98 % (12/17/17 0738) Vital Signs (24h Range):  Temp:  [96.5 °F (35.8 °C)-98.4 °F (36.9 °C)] 98.4 °F (36.9 °C)  Pulse:  [] 97  Resp:  [16-20] 18  SpO2:  [97 %-100 %] 98 %  BP: (114-130)/(26-83) 130/26     Weight: 90.2 kg (198 lb 13.7 oz)  Body mass index is 25.53 kg/m².    Intake/Output Summary (Last 24 hours) at 12/17/17 1028  Last data filed at  12/17/17 0921   Gross per 24 hour   Intake              490 ml   Output              700 ml   Net             -210 ml      Physical Exam   Constitutional: He is oriented to person, place, and time. He appears well-developed and well-nourished.   HENT:   Head: Normocephalic and atraumatic.   Eyes: EOM are normal. Pupils are equal, round, and reactive to light.   Neck: No tracheal deviation present. No thyromegaly present.   Cardiovascular: Normal rate.  Exam reveals no gallop and no friction rub.    No murmur heard.  Pulmonary/Chest: Effort normal and breath sounds normal. He has no wheezes. He has no rales.   Abdominal: There is no tenderness. No hernia.   Musculoskeletal: He exhibits tenderness (lumbar spine ). He exhibits no edema.   Neurological: He is alert and oriented to person, place, and time. No cranial nerve deficit or sensory deficit. He exhibits normal muscle tone.   Skin: Skin is warm. No erythema.   Multiple tattoos   Psychiatric: He has a normal mood and affect.       Significant Labs: None    Significant Imaging: No new imaging    Assessment/Plan:      Tobacco use    · Patient reports going outside to smoke  · Counseled patient on smoking cessation, but reports not interested in quitting at this time  · Will have nicotine patch available          History of substance abuse    - Admission drug screen positive for opiates (prescribed to him as an outpatient), but also benzodiazepines, THC, and cocaine  - Not a candidate for PICC placement, so pursuing LTAC placement for long-term IV antibiotics          Discitis of lumbar region    - Stable  - NSGY and ID following, appreciate assistance  - CT lumbar spine concerning for spondylodiscitis at L4-L5, now s/p aspiration with path showing acute and chronic OM, though blood and urine cultures remain negative to date.  - ESR, CRP mildly elevated earlier this admission.   - Procal normal  - HIV Abs negative  - PT/OT following  - Pain control  - Patient on  ceftriaxone and vancomycin as ID's recommendation- desire 6-8 weeks of tx- end date Jan 1st at earliest   - Will plan for follow up in NSGY clinic once ABX are done with MRI w/wo contrast & infectious labs.           Acute bilateral low back pain with bilateral sciatica    - Stable  - Patient with discitis and acute on chronic osteomyelitis of lumbar region. Pain mostly unchanged from yesterday.  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, which complicates our discharge planning and pain control regimen  - Symptomatic treatment with current pain regiment- gabapentin 900mg TID, Ibprofen 600mg Q6, baclofen, lidocaine patch and PRN Norco 10mg-325mg for break-through pain  - Continue to monitor              VTE Risk Mitigation         Ordered     enoxaparin injection 40 mg  Daily     Route:  Subcutaneous        11/20/17 1617     Medium Risk of VTE  Once      11/13/17 1910              Mateo Carlson MD  Department of Hospital Medicine   [facility]

## 2017-12-17 NOTE — PLAN OF CARE
Problem: Patient Care Overview  Goal: Plan of Care Review  Outcome: Ongoing (interventions implemented as appropriate)  No acute changes. Patient pleasant and cooperative with care. VSS. IV antibiotics continued.

## 2017-12-18 LAB
ANION GAP SERPL CALC-SCNC: 8 MMOL/L
BASOPHILS # BLD AUTO: 0.03 K/UL
BASOPHILS NFR BLD: 0.6 %
BUN SERPL-MCNC: 12 MG/DL
CALCIUM SERPL-MCNC: 9.4 MG/DL
CHLORIDE SERPL-SCNC: 105 MMOL/L
CO2 SERPL-SCNC: 26 MMOL/L
CREAT SERPL-MCNC: 0.8 MG/DL
DIFFERENTIAL METHOD: ABNORMAL
EOSINOPHIL # BLD AUTO: 0.2 K/UL
EOSINOPHIL NFR BLD: 3.2 %
ERYTHROCYTE [DISTWIDTH] IN BLOOD BY AUTOMATED COUNT: 14.9 %
EST. GFR  (AFRICAN AMERICAN): >60 ML/MIN/1.73 M^2
EST. GFR  (NON AFRICAN AMERICAN): >60 ML/MIN/1.73 M^2
FUNGUS SPEC CULT: NORMAL
GLUCOSE SERPL-MCNC: 99 MG/DL
HCT VFR BLD AUTO: 32 %
HGB BLD-MCNC: 10.3 G/DL
IMM GRANULOCYTES # BLD AUTO: 0.02 K/UL
IMM GRANULOCYTES NFR BLD AUTO: 0.4 %
LYMPHOCYTES # BLD AUTO: 1.6 K/UL
LYMPHOCYTES NFR BLD: 31.3 %
MCH RBC QN AUTO: 26.7 PG
MCHC RBC AUTO-ENTMCNC: 32.2 G/DL
MCV RBC AUTO: 83 FL
MONOCYTES # BLD AUTO: 0.7 K/UL
MONOCYTES NFR BLD: 14.5 %
NEUTROPHILS # BLD AUTO: 2.5 K/UL
NEUTROPHILS NFR BLD: 50 %
NRBC BLD-RTO: 0 /100 WBC
PLATELET # BLD AUTO: 261 K/UL
PMV BLD AUTO: 9.9 FL
POTASSIUM SERPL-SCNC: 4.2 MMOL/L
RBC # BLD AUTO: 3.86 M/UL
SODIUM SERPL-SCNC: 139 MMOL/L
VANCOMYCIN TROUGH SERPL-MCNC: 19 UG/ML
WBC # BLD AUTO: 4.98 K/UL

## 2017-12-18 PROCEDURE — 20600001 HC STEP DOWN PRIVATE ROOM

## 2017-12-18 PROCEDURE — 25000003 PHARM REV CODE 250: Performed by: STUDENT IN AN ORGANIZED HEALTH CARE EDUCATION/TRAINING PROGRAM

## 2017-12-18 PROCEDURE — 85025 COMPLETE CBC W/AUTO DIFF WBC: CPT

## 2017-12-18 PROCEDURE — 63600175 PHARM REV CODE 636 W HCPCS: Performed by: HOSPITALIST

## 2017-12-18 PROCEDURE — 80048 BASIC METABOLIC PNL TOTAL CA: CPT

## 2017-12-18 PROCEDURE — 99231 SBSQ HOSP IP/OBS SF/LOW 25: CPT | Mod: ,,, | Performed by: HOSPITALIST

## 2017-12-18 PROCEDURE — 25000003 PHARM REV CODE 250: Performed by: SURGERY

## 2017-12-18 PROCEDURE — 25000003 PHARM REV CODE 250: Performed by: HOSPITALIST

## 2017-12-18 PROCEDURE — 63600175 PHARM REV CODE 636 W HCPCS: Performed by: INTERNAL MEDICINE

## 2017-12-18 PROCEDURE — 80202 ASSAY OF VANCOMYCIN: CPT

## 2017-12-18 PROCEDURE — 36415 COLL VENOUS BLD VENIPUNCTURE: CPT

## 2017-12-18 PROCEDURE — 25000003 PHARM REV CODE 250: Performed by: INTERNAL MEDICINE

## 2017-12-18 RX ADMIN — BACLOFEN 20 MG: 10 TABLET ORAL at 09:12

## 2017-12-18 RX ADMIN — BACLOFEN 20 MG: 10 TABLET ORAL at 01:12

## 2017-12-18 RX ADMIN — IBUPROFEN 600 MG: 200 TABLET, FILM COATED ORAL at 05:12

## 2017-12-18 RX ADMIN — VANCOMYCIN HYDROCHLORIDE 1750 MG: 10 INJECTION, POWDER, LYOPHILIZED, FOR SOLUTION INTRAVENOUS at 01:12

## 2017-12-18 RX ADMIN — ENOXAPARIN SODIUM 40 MG: 100 INJECTION SUBCUTANEOUS at 05:12

## 2017-12-18 RX ADMIN — HYDROCODONE BITARTRATE AND ACETAMINOPHEN 1 TABLET: 10; 325 TABLET ORAL at 09:12

## 2017-12-18 RX ADMIN — HYDROCODONE BITARTRATE AND ACETAMINOPHEN 1 TABLET: 10; 325 TABLET ORAL at 01:12

## 2017-12-18 RX ADMIN — GABAPENTIN 900 MG: 300 CAPSULE ORAL at 05:12

## 2017-12-18 RX ADMIN — STANDARDIZED SENNA CONCENTRATE AND DOCUSATE SODIUM 1 TABLET: 8.6; 5 TABLET, FILM COATED ORAL at 08:12

## 2017-12-18 RX ADMIN — GABAPENTIN 900 MG: 300 CAPSULE ORAL at 01:12

## 2017-12-18 RX ADMIN — STANDARDIZED SENNA CONCENTRATE AND DOCUSATE SODIUM 1 TABLET: 8.6; 5 TABLET, FILM COATED ORAL at 09:12

## 2017-12-18 RX ADMIN — GABAPENTIN 900 MG: 300 CAPSULE ORAL at 09:12

## 2017-12-18 RX ADMIN — CEFEPIME 2 G: 2 INJECTION, POWDER, FOR SOLUTION INTRAVENOUS at 07:12

## 2017-12-18 RX ADMIN — VANCOMYCIN HYDROCHLORIDE 1750 MG: 10 INJECTION, POWDER, LYOPHILIZED, FOR SOLUTION INTRAVENOUS at 08:12

## 2017-12-18 RX ADMIN — HYDROCODONE BITARTRATE AND ACETAMINOPHEN 1 TABLET: 10; 325 TABLET ORAL at 04:12

## 2017-12-18 RX ADMIN — BACLOFEN 20 MG: 10 TABLET ORAL at 05:12

## 2017-12-18 RX ADMIN — VANCOMYCIN HYDROCHLORIDE 1750 MG: 10 INJECTION, POWDER, LYOPHILIZED, FOR SOLUTION INTRAVENOUS at 05:12

## 2017-12-18 NOTE — SUBJECTIVE & OBJECTIVE
Interval History: No acute events overnight.  Patient ambulating in the lau with walker.      Review of Systems   Constitutional: Negative for chills, fever and unexpected weight change.   HENT: Negative for hearing loss.    Eyes: Negative for visual disturbance.   Respiratory: Negative for shortness of breath.    Cardiovascular: Negative for chest pain.   Gastrointestinal: Negative for abdominal pain, diarrhea, nausea and vomiting.   Genitourinary: Negative for dysuria.   Musculoskeletal: Positive for back pain. Negative for arthralgias.        Back spasms that are improving   Skin: Negative for rash.   Neurological: Negative for seizures and numbness.     Objective:     Vital Signs (Most Recent):  Temp: 98.6 °F (37 °C) (12/18/17 1159)  Pulse: (!) 112 (12/18/17 1159)  Resp: 14 (12/18/17 1159)  BP: 124/63 (12/18/17 1159)  SpO2: 96 % (12/18/17 1159) Vital Signs (24h Range):  Temp:  [97.9 °F (36.6 °C)-98.6 °F (37 °C)] 98.6 °F (37 °C)  Pulse:  [] 112  Resp:  [14-18] 14  SpO2:  [92 %-99 %] 96 %  BP: (115-134)/(57-76) 124/63     Weight: 90.2 kg (198 lb 13.7 oz)  Body mass index is 25.53 kg/m².    Intake/Output Summary (Last 24 hours) at 12/18/17 1307  Last data filed at 12/18/17 0900   Gross per 24 hour   Intake              460 ml   Output                0 ml   Net              460 ml      Physical Exam   Constitutional: He is oriented to person, place, and time. He appears well-developed and well-nourished.   HENT:   Head: Normocephalic and atraumatic.   Eyes: EOM are normal. Pupils are equal, round, and reactive to light.   Neck: No tracheal deviation present. No thyromegaly present.   Cardiovascular: Normal rate.  Exam reveals no gallop and no friction rub.    No murmur heard.  Pulmonary/Chest: Effort normal and breath sounds normal. He has no wheezes. He has no rales.   Abdominal: There is no tenderness. No hernia.   Musculoskeletal: He exhibits tenderness (lumbar spine ). He exhibits no edema.    Neurological: He is alert and oriented to person, place, and time. No cranial nerve deficit or sensory deficit. He exhibits normal muscle tone.   Skin: Skin is warm. No erythema.   Multiple tattoos   Psychiatric: He has a normal mood and affect.       Significant Labs:   Recent Results (from the past 24 hour(s))   VANCOMYCIN, TROUGH before 4th dose    Collection Time: 12/18/17  1:51 AM   Result Value Ref Range    Vancomycin-Trough 19.0 10.0 - 22.0 ug/mL   Basic Metabolic Panel (BMP)    Collection Time: 12/18/17  4:09 AM   Result Value Ref Range    Sodium 139 136 - 145 mmol/L    Potassium 4.2 3.5 - 5.1 mmol/L    Chloride 105 95 - 110 mmol/L    CO2 26 23 - 29 mmol/L    Glucose 99 70 - 110 mg/dL    BUN, Bld 12 6 - 20 mg/dL    Creatinine 0.8 0.5 - 1.4 mg/dL    Calcium 9.4 8.7 - 10.5 mg/dL    Anion Gap 8 8 - 16 mmol/L    eGFR if African American >60.0 >60 mL/min/1.73 m^2    eGFR if non African American >60.0 >60 mL/min/1.73 m^2   CBC auto differential    Collection Time: 12/18/17  4:09 AM   Result Value Ref Range    WBC 4.98 3.90 - 12.70 K/uL    RBC 3.86 (L) 4.60 - 6.20 M/uL    Hemoglobin 10.3 (L) 14.0 - 18.0 g/dL    Hematocrit 32.0 (L) 40.0 - 54.0 %    MCV 83 82 - 98 fL    MCH 26.7 (L) 27.0 - 31.0 pg    MCHC 32.2 32.0 - 36.0 g/dL    RDW 14.9 (H) 11.5 - 14.5 %    Platelets 261 150 - 350 K/uL    MPV 9.9 9.2 - 12.9 fL    Immature Granulocytes 0.4 0.0 - 0.5 %    Gran # 2.5 1.8 - 7.7 K/uL    Immature Grans (Abs) 0.02 0.00 - 0.04 K/uL    Lymph # 1.6 1.0 - 4.8 K/uL    Mono # 0.7 0.3 - 1.0 K/uL    Eos # 0.2 0.0 - 0.5 K/uL    Baso # 0.03 0.00 - 0.20 K/uL    nRBC 0 0 /100 WBC    Gran% 50.0 38.0 - 73.0 %    Lymph% 31.3 18.0 - 48.0 %    Mono% 14.5 4.0 - 15.0 %    Eosinophil% 3.2 0.0 - 8.0 %    Basophil% 0.6 0.0 - 1.9 %    Differential Method Automated          Significant Imaging: No new imaging

## 2017-12-18 NOTE — PROGRESS NOTES
[facility] Hospital Medicine  Progress Note    Patient Name: Evgeny Wilde  MRN: [id number]  Patient Class: IP- Inpatient   Admission Date: 11/13/2017  Length of Stay: 35 days  Attending Physician: Sara Aruaz MD  Primary Care Provider: Primary Doctor [facility] Medicine Team: [facility] 2 Mateo Carlson MD    Subjective:     Principal Problem:Osteomyelitis    HPI:  32 years old male with no significant past medical Hx, present to the ED with progressive lower back pain with sciatica for the last month. He works on constructions, pain start suddenly 1 month ago, at the lower back, dull, aggravated with movements. No inciting factors. Went to [facility] and [facility] ER, he had CT and MRI and they told him he had disk problem and he need neuro follow up and provided with muscle relaxant and narcotic for pain control. Patient stated the medicine is gone and the ain is worse with numbness in his left leg. He is not working for the last month. Denied any trauma, recent infection, surgery, foreign body, recent IV drug abuse. Also denied any fever, mekhi loss, fecal or urine incontinence, saddle anaesthesia.          Hospital Course:  11/14: NSGY following             Blood cultures NGTD, Urine cultures pending.              CT lumbar spine concerning for spondylodiscitis.  11/15: afebrile overnight, blood CX NGTD, NSGY stated no indications for NSGY interventions this admission, they want follow him up as outpatient after after Abx. We will pursue IR for disk Bx/aspirate for Dx.     11/16: NAEON. Vitals stable. Pending disc aspiration via IR tomorrow.  11/17: NAEON. Vitals stable. Planned for L4-L5 biopsy today.  11/18: NAEON. Vitals stable. S/p L4-L5 disc aspiration. Cultures, pathology report pending.  11/19: NAEON. Vitals stable.S/p L4-L5 disc aspiration. Cultures, pathology report pending.  11/20: NAEON. Vitals stable. Pathology showing acute and chronic OM. Cultures remain negative.  11/21:  NAEON. Vitals stable. Patient with trouble sleeping last night with pain in the buttocks and back as well as numbness in the toes. Pt still without adequate pain control.  11/22: NAEON. VSS. Patient with increased pain issues today with pain radiating from the back down to his feet.   11/22: NAEON. VSS. Patient with better pain control today.  11/23: NAEON. VSS. Patient with better pain control today.  11/24: NAEON. VSS. Patient asking for increased frequency of pain meds today.  11/27: NAEON. VSS.   11/28: NAEON. VSS. Patient with spasms still but no other complaints.  11/29: NAEON. VSS. Patient with spasms but no other complaints.  11/30: NAEON. VSS. Patient with spasms but no other complaints.  12/2: NAEON. VSS. Patient with continued pain and spasms.  12/3: NAEON. VSS. Patient with continued pain and spasms.  12/4:  Patient with lumbar back pain that was different than normal back pain.  Given 5 mg of oxycodone with some pain relief.  Remains afebrile with normal vitals. 12/5: Mr. Wilde reports difficulty sleeping secondary to muscle spasm.  Transitioned to baclofen yesterday with minimal to no improvement in symptoms.  Continues to be afebrile but has been intermittently tachycardic which coincides with increase in pain per patient  12/6  Back pain and muslce spasm improvied, patient able to sleep at night for the first time in 2 days, vanc level 19.9.  12/7:  Patient not in room during initial rounds this AM as he went outside and reports smoking a cigarette.  He continues to complain of muscle spasms that are improved with activity as well as skin irritation in the skin folds in the pelvic area.    12/8:  No acute events overnight.  Patient intermittently tachycardic.  Pain and spasms better controlled.  Skin irritation in pelvic region improved with cream.  No acute events overnight.  Patient intermittently tachycardic.  Pain and spasms better controlled.  Skin irritation in pelvic region improved with cream.     12/9:  Patient off the floor this AM to go outside to smoke.  Patients nurse concern that patient returned sedated.  He continues to complain of muscle spasms.    12/10: No acute events overnight.  Continued spasms while in bed otherwise no complaints  12/11:NAEON. Afebrile and HD stable this AM.  complaining of muscle spasms. Otherwise, no complaints.  12/12:  No acute events.  Vancomycin trough elevated at 23 this AM so second dose of vancomycin ordered to be held.  Continues to have back spasms while in bed which are improved with ambulation  12/13:  No acute events overnight.  Patient reports continued improvement in back spasms with physical activity.    12/14: No acute events overnight.  Pain better controlled today  12/15-12/16: No acute events  12/17: No acute events.  Patient walking the lau with walker      Interval History: No acute events overnight.  Patient ambulating in the lau with walker.      Review of Systems   Constitutional: Negative for chills, fever and unexpected weight change.   HENT: Negative for hearing loss.    Eyes: Negative for visual disturbance.   Respiratory: Negative for shortness of breath.    Cardiovascular: Negative for chest pain.   Gastrointestinal: Negative for abdominal pain, diarrhea, nausea and vomiting.   Genitourinary: Negative for dysuria.   Musculoskeletal: Positive for back pain. Negative for arthralgias.        Back spasms that are improving   Skin: Negative for rash.   Neurological: Negative for seizures and numbness.     Objective:     Vital Signs (Most Recent):  Temp: 98.6 °F (37 °C) (12/18/17 1159)  Pulse: (!) 112 (12/18/17 1159)  Resp: 14 (12/18/17 1159)  BP: 124/63 (12/18/17 1159)  SpO2: 96 % (12/18/17 1159) Vital Signs (24h Range):  Temp:  [97.9 °F (36.6 °C)-98.6 °F (37 °C)] 98.6 °F (37 °C)  Pulse:  [] 112  Resp:  [14-18] 14  SpO2:  [92 %-99 %] 96 %  BP: (115-134)/(57-76) 124/63     Weight: 90.2 kg (198 lb 13.7 oz)  Body mass index is 25.53  kg/m².    Intake/Output Summary (Last 24 hours) at 12/18/17 1307  Last data filed at 12/18/17 0900   Gross per 24 hour   Intake              460 ml   Output                0 ml   Net              460 ml      Physical Exam   Constitutional: He is oriented to person, place, and time. He appears well-developed and well-nourished.   HENT:   Head: Normocephalic and atraumatic.   Eyes: EOM are normal. Pupils are equal, round, and reactive to light.   Neck: No tracheal deviation present. No thyromegaly present.   Cardiovascular: Normal rate.  Exam reveals no gallop and no friction rub.    No murmur heard.  Pulmonary/Chest: Effort normal and breath sounds normal. He has no wheezes. He has no rales.   Abdominal: There is no tenderness. No hernia.   Musculoskeletal: He exhibits tenderness (lumbar spine ). He exhibits no edema.   Neurological: He is alert and oriented to person, place, and time. No cranial nerve deficit or sensory deficit. He exhibits normal muscle tone.   Skin: Skin is warm. No erythema.   Multiple tattoos   Psychiatric: He has a normal mood and affect.       Significant Labs:   Recent Results (from the past 24 hour(s))   VANCOMYCIN, TROUGH before 4th dose    Collection Time: 12/18/17  1:51 AM   Result Value Ref Range    Vancomycin-Trough 19.0 10.0 - 22.0 ug/mL   Basic Metabolic Panel (BMP)    Collection Time: 12/18/17  4:09 AM   Result Value Ref Range    Sodium 139 136 - 145 mmol/L    Potassium 4.2 3.5 - 5.1 mmol/L    Chloride 105 95 - 110 mmol/L    CO2 26 23 - 29 mmol/L    Glucose 99 70 - 110 mg/dL    BUN, Bld 12 6 - 20 mg/dL    Creatinine 0.8 0.5 - 1.4 mg/dL    Calcium 9.4 8.7 - 10.5 mg/dL    Anion Gap 8 8 - 16 mmol/L    eGFR if African American >60.0 >60 mL/min/1.73 m^2    eGFR if non African American >60.0 >60 mL/min/1.73 m^2   CBC auto differential    Collection Time: 12/18/17  4:09 AM   Result Value Ref Range    WBC 4.98 3.90 - 12.70 K/uL    RBC 3.86 (L) 4.60 - 6.20 M/uL    Hemoglobin 10.3 (L) 14.0 -  18.0 g/dL    Hematocrit 32.0 (L) 40.0 - 54.0 %    MCV 83 82 - 98 fL    MCH 26.7 (L) 27.0 - 31.0 pg    MCHC 32.2 32.0 - 36.0 g/dL    RDW 14.9 (H) 11.5 - 14.5 %    Platelets 261 150 - 350 K/uL    MPV 9.9 9.2 - 12.9 fL    Immature Granulocytes 0.4 0.0 - 0.5 %    Gran # 2.5 1.8 - 7.7 K/uL    Immature Grans (Abs) 0.02 0.00 - 0.04 K/uL    Lymph # 1.6 1.0 - 4.8 K/uL    Mono # 0.7 0.3 - 1.0 K/uL    Eos # 0.2 0.0 - 0.5 K/uL    Baso # 0.03 0.00 - 0.20 K/uL    nRBC 0 0 /100 WBC    Gran% 50.0 38.0 - 73.0 %    Lymph% 31.3 18.0 - 48.0 %    Mono% 14.5 4.0 - 15.0 %    Eosinophil% 3.2 0.0 - 8.0 %    Basophil% 0.6 0.0 - 1.9 %    Differential Method Automated          Significant Imaging: No new imaging    Assessment/Plan:      Tobacco use    · Patient reports going outside to smoke  · Counseled patient on smoking cessation, but reports not interested in quitting at this time  · Will have nicotine patch available          History of substance abuse    - Admission drug screen positive for opiates (prescribed to him as an outpatient), but also benzodiazepines, THC, and cocaine  - Not a candidate for PICC placement, so pursuing LTAC placement for long-term IV antibiotics          Discitis of lumbar region    - Stable  - NSGY and ID following, appreciate assistance  - CT lumbar spine concerning for spondylodiscitis at L4-L5, now s/p aspiration with path showing acute and chronic OM, though blood and urine cultures remain negative to date.  - ESR, CRP mildly elevated earlier this admission.   - Procal normal  - HIV Abs negative  - PT/OT following  - Pain control  - Patient on ceftriaxone and vancomycin as ID's recommendation- desire 6-8 weeks of tx- end date Jan 1st at earliest   - Will plan for follow up in NSGY clinic once ABX are done with MRI w/wo contrast & infectious labs.           Acute bilateral low back pain with bilateral sciatica    - Stable  - Patient with discitis and acute on chronic osteomyelitis of lumbar region. Pain  mostly unchanged from yesterday.  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, which complicates our discharge planning and pain control regimen  - Symptomatic treatment with current pain regiment- gabapentin 900mg TID, Ibprofen 600mg Q6, baclofen, lidocaine patch and PRN Norco 10mg-325mg for break-through pain  - Continue to monitor              VTE Risk Mitigation         Ordered     enoxaparin injection 40 mg  Daily     Route:  Subcutaneous        11/20/17 1617     Medium Risk of VTE  Once      11/13/17 1910              Mateo Carlson MD  Department of Hospital Medicine   [facility]

## 2017-12-18 NOTE — PLAN OF CARE
Sw did inform Pt and family in the room about possible transfer later this pm if insurance approves to [facility]  Pt verbalized agreement .

## 2017-12-18 NOTE — PLAN OF CARE
Call placed to Shanthi at [facility] to check on status of patient's admission. This CM informed that Shanthi has been in contact with [facility] and a financial agreement should be worked out later today. Will continue to follow.    Keisha Cadena RN  Ext [phone]

## 2017-12-18 NOTE — PLAN OF CARE
Problem: Patient Care Overview  Goal: Plan of Care Review  Outcome: Ongoing (interventions implemented as appropriate)   12/18/17 0452   Coping/Psychosocial   Plan Of Care Reviewed With patient     Pt remain aaox4, VSS, no acute changes over night. Significant other at bed side. PRn pain meds given for pain. WCTM

## 2017-12-19 VITALS
RESPIRATION RATE: 18 BRPM | DIASTOLIC BLOOD PRESSURE: 58 MMHG | HEART RATE: 102 BPM | WEIGHT: 198.88 LBS | HEIGHT: 74 IN | TEMPERATURE: 98 F | BODY MASS INDEX: 25.52 KG/M2 | SYSTOLIC BLOOD PRESSURE: 101 MMHG | OXYGEN SATURATION: 98 %

## 2017-12-19 LAB — VANCOMYCIN TROUGH SERPL-MCNC: 18.1 UG/ML

## 2017-12-19 PROCEDURE — 25000003 PHARM REV CODE 250: Performed by: SURGERY

## 2017-12-19 PROCEDURE — 80202 ASSAY OF VANCOMYCIN: CPT

## 2017-12-19 PROCEDURE — 36415 COLL VENOUS BLD VENIPUNCTURE: CPT

## 2017-12-19 PROCEDURE — 25000003 PHARM REV CODE 250: Performed by: STUDENT IN AN ORGANIZED HEALTH CARE EDUCATION/TRAINING PROGRAM

## 2017-12-19 PROCEDURE — 25000003 PHARM REV CODE 250: Performed by: HOSPITALIST

## 2017-12-19 PROCEDURE — 63600175 PHARM REV CODE 636 W HCPCS: Performed by: HOSPITALIST

## 2017-12-19 PROCEDURE — 99238 HOSP IP/OBS DSCHRG MGMT 30/<: CPT | Mod: ,,, | Performed by: HOSPITALIST

## 2017-12-19 RX ORDER — IBUPROFEN 600 MG/1
600 TABLET ORAL EVERY 6 HOURS PRN
Start: 2017-12-19

## 2017-12-19 RX ORDER — BACLOFEN 20 MG/1
20 TABLET ORAL 3 TIMES DAILY
Qty: 90 TABLET | Refills: 11
Start: 2017-12-19 | End: 2017-12-19

## 2017-12-19 RX ORDER — BACLOFEN 20 MG/1
20 TABLET ORAL 3 TIMES DAILY
Qty: 60 TABLET | Refills: 0
Start: 2017-12-19 | End: 2018-02-15

## 2017-12-19 RX ORDER — RAMELTEON 8 MG/1
8 TABLET ORAL NIGHTLY PRN
Start: 2017-12-19 | End: 2018-05-22

## 2017-12-19 RX ORDER — IBUPROFEN 200 MG
1 TABLET ORAL DAILY
Refills: 0
Start: 2017-12-20 | End: 2018-05-22

## 2017-12-19 RX ORDER — AMOXICILLIN 250 MG
1 CAPSULE ORAL 2 TIMES DAILY
Start: 2017-12-19 | End: 2018-05-22

## 2017-12-19 RX ADMIN — VANCOMYCIN HYDROCHLORIDE 1750 MG: 10 INJECTION, POWDER, LYOPHILIZED, FOR SOLUTION INTRAVENOUS at 12:12

## 2017-12-19 RX ADMIN — BACLOFEN 20 MG: 10 TABLET ORAL at 05:12

## 2017-12-19 RX ADMIN — GABAPENTIN 900 MG: 300 CAPSULE ORAL at 01:12

## 2017-12-19 RX ADMIN — VANCOMYCIN HYDROCHLORIDE 1750 MG: 10 INJECTION, POWDER, LYOPHILIZED, FOR SOLUTION INTRAVENOUS at 08:12

## 2017-12-19 RX ADMIN — STANDARDIZED SENNA CONCENTRATE AND DOCUSATE SODIUM 1 TABLET: 8.6; 5 TABLET, FILM COATED ORAL at 08:12

## 2017-12-19 RX ADMIN — HYDROCODONE BITARTRATE AND ACETAMINOPHEN 1 TABLET: 10; 325 TABLET ORAL at 05:12

## 2017-12-19 RX ADMIN — HYDROCODONE BITARTRATE AND ACETAMINOPHEN 1 TABLET: 10; 325 TABLET ORAL at 11:12

## 2017-12-19 RX ADMIN — BACLOFEN 20 MG: 10 TABLET ORAL at 01:12

## 2017-12-19 RX ADMIN — GABAPENTIN 900 MG: 300 CAPSULE ORAL at 05:12

## 2017-12-19 NOTE — DISCHARGE SUMMARY
[facility] Hospital Medicine  Discharge Summary      Patient Name: Evgeny Wilde  MRN: [id number]  Admission Date: 11/13/2017  Hospital Length of Stay: 36 days  Discharge Date and Time:  12/19/2017 2:37 PM  Attending Physician: Moo Sharma MD   Discharging Provider: Mateo Carlson MD  Primary Care Provider: Primary Doctor [facility] Medicine Team: [facility] HOSP MED 2 Mateo Carlson MD    HPI:   32 years old male with no significant past medical Hx, present to the ED with progressive lower back pain with sciatica for the last month. He works on constructions, pain start suddenly 1 month ago, at the lower back, dull, aggravated with movements. No inciting factors. Went to [facility] and [facility] ER, he had CT and MRI and they told him he had disk problem and he need neuro follow up and provided with muscle relaxant and narcotic for pain control. Patient stated the medicine is gone and the ain is worse with numbness in his left leg. He is not working for the last month. Denied any trauma, recent infection, surgery, foreign body, recent IV drug abuse. Also denied any fever, mekhi loss, fecal or urine incontinence, saddle anaesthesia.          * No surgery found *      Hospital Course:   Evgeny Wilde was admitted to Internal Medicine team 2.  Neurosurgery was consulted after CT scan obtained concerning for spondylodiscitis.  Blood and urine cultures were negative.  Neurosurgery felt that there was no indication for surgical intervention during this hospitalization.  On 11/17/2017 he underwent a IR biopsy of th L4-L5.  Pathology resulted as acute on chronic osteomyelitis. Infectious diseases was consulted and recommended 6-8 week of IV antibiotics.  He was started on IV vancomycin and rocephin.  Given his history IV drug use he was not a candidate for PICC line and home infusions.  During this hospitalization he complained of back spasms that was treated with lidocaine patches, anti-spasmodic  medications, and Norco.  Patient would ambulate off the floor to smoke cigarettes.  He was counseled on smoking cessation and offered a nicotine patch.  On hospital day 36 (12/19/2017) he was accepted to an LTAC facility to have the remainder of his antibiotic therapy.  Referrals made to follow up with NSGY and ID upon completion of antibiotics.    ROS:  Constitutional: Negative for chills, fever and unexpected weight change.   HENT: Negative for hearing loss.    Eyes: Negative for visual disturbance.   Respiratory: Negative for shortness of breath.    Cardiovascular: Negative for chest pain.   Gastrointestinal: Negative for abdominal pain, diarrhea, nausea and vomiting.   Genitourinary: Negative for dysuria.   Musculoskeletal: Positive for back pain. Negative for arthralgias.        Back spasms that are improving   Skin: Negative for rash.   Neurological: Negative for seizures and numbness.     Physical Exam:  Constitutional: He is oriented to person, place, and time. He appears well-developed and well-nourished.   HENT:   Head: Normocephalic and atraumatic.   Eyes: EOM are normal. Pupils are equal, round, and reactive to light.   Neck: No tracheal deviation present. No thyromegaly present.   Cardiovascular: Normal rate.  Exam reveals no gallop and no friction rub.    No murmur heard.  Pulmonary/Chest: Effort normal and breath sounds normal. He has no wheezes. He has no rales.   Abdominal: There is no tenderness. No hernia.   Musculoskeletal: He exhibits point tenderness (lumbar spine ). He exhibits no edema.   Neurological: He is alert and oriented to person, place, and time. No cranial nerve deficit or sensory deficit. He exhibits normal muscle tone.   Skin: Skin is warm. No erythema.   Multiple tattoos   Psychiatric: He has a normal mood and affect.      Consults:   Consults         Status Ordering Provider     Inpatient consult to Infectious Diseases  Once     Provider:  (Not yet assigned)    Completed  MONY DUFF     Inpatient consult to Interventional Radiology  Once     Provider:  (Not yet assigned)    Completed MATT BLANCHARD     Inpatient consult to Neurosurgery  Once     Provider:  (Not yet assigned)    Completed EVER STAFFORD          Tobacco use    · Patient reports going outside to smoke  · Counseled patient on smoking cessation, but reports not interested in quitting at this time  · Will have nicotine patch available          History of substance abuse    - Admission drug screen positive for opiates (prescribed to him as an outpatient), but also benzodiazepines, THC, and cocaine  - Patient to be transferred to LTAC facility          Discitis of lumbar region    - Stable  - NSGY and ID following, appreciate assistance  - CT lumbar spine concerning for spondylodiscitis at L4-L5, now s/p aspiration with path showing acute and chronic OM, though blood and urine cultures remain negative to date.  - ESR, CRP mildly elevated earlier this admission.   - Procal normal  - HIV Abs negative  - PT/OT following  - Pain control  - Patient on ceftriaxone and vancomycin as ID's recommendation- desire 6-8 weeks of tx- end date Jan 1st at earliest   - Will plan for follow up in NSGY clinic once ABX are done with MRI w/wo contrast & infectious labs.           Acute bilateral low back pain with bilateral sciatica    - Stable  - Patient with discitis and acute on chronic osteomyelitis of lumbar region. Pain mostly unchanged from yesterday.  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, which complicates our discharge planning and pain control regimen  - Symptomatic treatment with current pain regiment- gabapentin 900mg TID, Ibprofen 600mg Q6, baclofen, lidocaine patch and PRN Norco 10mg-325mg for break-through pain                Final Active Diagnoses:    Diagnosis Date Noted POA    PRINCIPAL PROBLEM:  Osteomyelitis [M86.9] 12/10/2017 Yes    Tobacco use [Z72.0] 12/07/2017 Yes    History of substance abuse  [Z87.898] 11/26/2017 Yes    Acute bilateral low back pain with bilateral sciatica [M54.42, M54.41] 11/18/2017 Yes    Discitis of lumbar region [M46.46] 11/13/2017 Yes      Problems Resolved During this Admission:    Diagnosis Date Noted Date Resolved POA    Skin irritation [R23.8] 12/07/2017 12/10/2017 Yes       Discharged Condition: stable    Disposition: Long Term Care    Follow Up:  Follow-up Information     [facility] Today.    Specialty:  LTAC  Contact information:  [address] [address]  [phone]             Luis Bauer MD In 3 weeks.    Specialty:  Neurosurgery  Contact information:  [address]  [facility] [address]  [phone]                 Patient Instructions:     Diet general     Call MD for:  temperature >100.4     Call MD for:  persistent nausea and vomiting or diarrhea     Call MD for:  severe uncontrolled pain     Call MD for:  redness, tenderness, or signs of infection (pain, swelling, redness, odor or green/yellow discharge around incision site)     Call MD for:  difficulty breathing or increased cough     Call MD for:  severe persistent headache     Call MD for:  worsening rash     Call MD for:  persistent dizziness, light-headedness, or visual disturbances     Call MD for:  increased confusion or weakness     Activity as tolerated         Significant Diagnostic Studies: Labs:   CMP   Recent Labs  Lab 12/18/17  0409      K 4.2      CO2 26   GLU 99   BUN 12   CREATININE 0.8   CALCIUM 9.4   ANIONGAP 8   ESTGFRAFRICA >60.0   EGFRNONAA >60.0    and CBC   Recent Labs  Lab 12/18/17  0409   WBC 4.98   HGB 10.3*   HCT 32.0*        Microbiology:   Blood Culture   Lab Results   Component Value Date    LABBLOO No growth after 5 days. 11/13/2017    LABBLOO No growth after 5 days. 11/13/2017       Pending Diagnostic Studies:     None         Medications:  Reconciled Home Medications:   Current Discharge Medication List      START taking these  medications    Details   baclofen (LIORESAL) 20 MG tablet Take 1 tablet (20 mg total) by mouth 3 (three) times daily.  Qty: 60 tablet, Refills: 0      cefTRIAXone 2 g in dextrose 5 % 50 mL (ROCEPHIN) 2 g/50 mL PgBk IVPB Inject 50 mLs (2 g total) into the vein once daily.      ibuprofen (ADVIL,MOTRIN) 600 MG tablet Take 1 tablet (600 mg total) by mouth every 6 (six) hours as needed.      nicotine (NICODERM CQ) 14 mg/24 hr Place 1 patch onto the skin once daily.  Refills: 0      ramelteon (ROZEREM) 8 mg tablet Take 1 tablet (8 mg total) by mouth nightly as needed for Insomnia.      senna-docusate 8.6-50 mg (PERICOLACE) 8.6-50 mg per tablet Take 1 tablet by mouth 2 (two) times daily.      VANCOMYCIN HCL (VANCOMYCIN IN 5 % DEXTROSE) 1.75 gram/500 mL Soln Inject 500 mLs (1,750 mg total) into the vein every 8 (eight) hours.         CONTINUE these medications which have NOT CHANGED    Details   gabapentin (NEURONTIN) 300 MG capsule Take 300 mg by mouth 3 (three) times daily.      hydrocodone-acetaminophen 7.5-325mg (NORCO) 7.5-325 mg per tablet Take 1 tablet by mouth every 6 (six) hours as needed for Pain.         STOP taking these medications       cyclobenzaprine (FLEXERIL) 5 MG tablet Comments:   Reason for Stopping:               Indwelling Lines/Drains at time of discharge:   Lines/Drains/Airways          No matching active lines, drains, or airways          Mateo Carlson MD  Department of Hospital Medicine  [facility]

## 2017-12-19 NOTE — PLAN OF CARE
Rajesh did place call to Shanthi with [facility], Pt's insurance sent her an email at 4pm and should have a contact today for Pt. Rajesh to be updated once insurance approves, CM aware.

## 2017-12-19 NOTE — PROGRESS NOTES
[facility] Hospital Medicine  Progress Note    Patient Name: Evgeny Wilde  MRN: [id number]  Patient Class: IP- Inpatient   Admission Date: 11/13/2017  Length of Stay: 36 days  Attending Physician: Moo Sharma MD  Primary Care Provider: Primary Doctor [facility] Medicine Team: [facility] HOSP MED 2 Mateo Carlson MD    Subjective:     Principal Problem:Osteomyelitis    HPI:  32 years old male with no significant past medical Hx, present to the ED with progressive lower back pain with sciatica for the last month. He works on constructions, pain start suddenly 1 month ago, at the lower back, dull, aggravated with movements. No inciting factors. Went to [facility] and [facility] ER, he had CT and MRI and they told him he had disk problem and he need neuro follow up and provided with muscle relaxant and narcotic for pain control. Patient stated the medicine is gone and the ain is worse with numbness in his left leg. He is not working for the last month. Denied any trauma, recent infection, surgery, foreign body, recent IV drug abuse. Also denied any fever, mekhi loss, fecal or urine incontinence, saddle anaesthesia.          Hospital Course:  11/14: NSGY following             Blood cultures NGTD, Urine cultures pending.              CT lumbar spine concerning for spondylodiscitis.  11/15: afebrile overnight, blood CX NGTD, NSGY stated no indications for NSGY interventions this admission, they want follow him up as outpatient after after Abx. We will pursue IR for disk Bx/aspirate for Dx.     11/16: NAEON. Vitals stable. Pending disc aspiration via IR tomorrow.  11/17: NAEON. Vitals stable. Planned for L4-L5 biopsy today.  11/18: NAEON. Vitals stable. S/p L4-L5 disc aspiration. Cultures, pathology report pending.  11/19: NAEON. Vitals stable.S/p L4-L5 disc aspiration. Cultures, pathology report pending.  11/20: NAEON. Vitals stable. Pathology showing acute and chronic OM. Cultures remain  negative.  11/21: NAEON. Vitals stable. Patient with trouble sleeping last night with pain in the buttocks and back as well as numbness in the toes. Pt still without adequate pain control.  11/22: NAEON. VSS. Patient with increased pain issues today with pain radiating from the back down to his feet.   11/22: NAEON. VSS. Patient with better pain control today.  11/23: NAEON. VSS. Patient with better pain control today.  11/24: NAEON. VSS. Patient asking for increased frequency of pain meds today.  11/27: NAEON. VSS.   11/28: NAEON. VSS. Patient with spasms still but no other complaints.  11/29: NAEON. VSS. Patient with spasms but no other complaints.  11/30: NAEON. VSS. Patient with spasms but no other complaints.  12/2: NAEON. VSS. Patient with continued pain and spasms.  12/3: NAEON. VSS. Patient with continued pain and spasms.  12/4:  Patient with lumbar back pain that was different than normal back pain.  Given 5 mg of oxycodone with some pain relief.  Remains afebrile with normal vitals. 12/5: Mr. Wilde reports difficulty sleeping secondary to muscle spasm.  Transitioned to baclofen yesterday with minimal to no improvement in symptoms.  Continues to be afebrile but has been intermittently tachycardic which coincides with increase in pain per patient  12/6  Back pain and muslce spasm improvied, patient able to sleep at night for the first time in 2 days, vanc level 19.9.  12/7:  Patient not in room during initial rounds this AM as he went outside and reports smoking a cigarette.  He continues to complain of muscle spasms that are improved with activity as well as skin irritation in the skin folds in the pelvic area.    12/8:  No acute events overnight.  Patient intermittently tachycardic.  Pain and spasms better controlled.  Skin irritation in pelvic region improved with cream.  No acute events overnight.  Patient intermittently tachycardic.  Pain and spasms better controlled.  Skin irritation in pelvic region  improved with cream.    12/9:  Patient off the floor this AM to go outside to smoke.  Patients nurse concern that patient returned sedated.  He continues to complain of muscle spasms.    12/10: No acute events overnight.  Continued spasms while in bed otherwise no complaints  12/11:NAEON. Afebrile and HD stable this AM.  complaining of muscle spasms. Otherwise, no complaints.  12/12:  No acute events.  Vancomycin trough elevated at 23 this AM so second dose of vancomycin ordered to be held.  Continues to have back spasms while in bed which are improved with ambulation  12/13:  No acute events overnight.  Patient reports continued improvement in back spasms with physical activity.    12/14: No acute events overnight.  Pain better controlled today  12/15-12/16: No acute events  12/17: No acute events.  Patient walking the lau with walker  12/18:  No acute events overnight.  Patient ambulating in the lau with walker    Interval History: No acute events overnight.  Patient ambulating the halls without the assistance of the walker.      Review of Systems   Constitutional: Negative for chills, fever and unexpected weight change.   HENT: Negative for hearing loss.    Eyes: Negative for visual disturbance.   Respiratory: Negative for shortness of breath.    Cardiovascular: Negative for chest pain.   Gastrointestinal: Negative for abdominal pain, diarrhea, nausea and vomiting.   Genitourinary: Negative for dysuria.   Musculoskeletal: Positive for back pain. Negative for arthralgias.        Back spasms that are improving   Skin: Negative for rash.   Neurological: Negative for seizures and numbness.     Objective:     Vital Signs (Most Recent):  Temp: 98 °F (36.7 °C) (12/19/17 0840)  Pulse: 106 (12/19/17 0840)  Resp: 16 (12/19/17 0840)  BP: 121/69 (12/19/17 0840)  SpO2: 95 % (12/19/17 0840) Vital Signs (24h Range):  Temp:  [98 °F (36.7 °C)-98.7 °F (37.1 °C)] 98 °F (36.7 °C)  Pulse:  [101-112] 106  Resp:  [14-18] 16  SpO2:   [95 %-99 %] 95 %  BP: (121-143)/(56-72) 121/69     Weight: 90.2 kg (198 lb 13.7 oz)  Body mass index is 25.53 kg/m².    Intake/Output Summary (Last 24 hours) at 12/19/17 1023  Last data filed at 12/19/17 0600   Gross per 24 hour   Intake             5700 ml   Output                0 ml   Net             5700 ml      Physical Exam   Constitutional: He is oriented to person, place, and time. He appears well-developed and well-nourished.   HENT:   Head: Normocephalic and atraumatic.   Eyes: EOM are normal. Pupils are equal, round, and reactive to light.   Neck: No tracheal deviation present. No thyromegaly present.   Cardiovascular: Normal rate.  Exam reveals no gallop and no friction rub.    No murmur heard.  Pulmonary/Chest: Effort normal and breath sounds normal. He has no wheezes. He has no rales.   Abdominal: There is no tenderness. No hernia.   Musculoskeletal: He exhibits tenderness (lumbar spine ). He exhibits no edema.   Neurological: He is alert and oriented to person, place, and time. No cranial nerve deficit or sensory deficit. He exhibits normal muscle tone.   Skin: Skin is warm. No erythema.   Multiple tattoos   Psychiatric: He has a normal mood and affect.       Significant Labs:   Recent Results (from the past 24 hour(s))   VANCOMYCIN, TROUGH before 4th dose    Collection Time: 12/19/17  7:57 AM   Result Value Ref Range    Vancomycin-Trough 18.1 10.0 - 22.0 ug/mL         Significant Imaging: No new imaging    Assessment/Plan:      Tobacco use    · Patient reports going outside to smoke  · Counseled patient on smoking cessation, but reports not interested in quitting at this time  · Will have nicotine patch available          History of substance abuse    - Admission drug screen positive for opiates (prescribed to him as an outpatient), but also benzodiazepines, THC, and cocaine  - Not a candidate for PICC placement, so pursuing LTAC placement for long-term IV antibiotics          Discitis of lumbar  region    - Stable  - NSGY and ID following, appreciate assistance  - CT lumbar spine concerning for spondylodiscitis at L4-L5, now s/p aspiration with path showing acute and chronic OM, though blood and urine cultures remain negative to date.  - ESR, CRP mildly elevated earlier this admission.   - Procal normal  - HIV Abs negative  - PT/OT following  - Pain control  - Patient on ceftriaxone and vancomycin as ID's recommendation- desire 6-8 weeks of tx- end date Jan 1st at earliest   - Will plan for follow up in NSGY clinic once ABX are done with MRI w/wo contrast & infectious labs.           Acute bilateral low back pain with bilateral sciatica    - Stable  - Patient with discitis and acute on chronic osteomyelitis of lumbar region. Pain mostly unchanged from yesterday.  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, which complicates our discharge planning and pain control regimen  - Symptomatic treatment with current pain regiment- gabapentin 900mg TID, Ibprofen 600mg Q6, baclofen, lidocaine patch and PRN Norco 10mg-325mg for break-through pain  - Continue to monitor              VTE Risk Mitigation         Ordered     enoxaparin injection 40 mg  Daily     Route:  Subcutaneous        11/20/17 1617     Medium Risk of VTE  Once      11/13/17 1910          Dispo:  Pending LTAC placement    Mateo Carlson MD  Department of Hospital Medicine   [facility]

## 2017-12-19 NOTE — PLAN OF CARE
[facility]    FACILITY TRANSFER ORDERS      Patient Name: Evgeny Wilde  YOB: 1985    PCP: Primary Doctor No   PCP Address: None  PCP Phone Number: None  PCP Fax: None    Encounter Date: 12/19/2017    Admit to: [facility]    Vital Signs:  Routine    Diagnoses:   Active Hospital Problems    Diagnosis  POA    *Osteomyelitis [M86.9]  Yes    Tobacco use [Z72.0]  Yes    History of substance abuse [Z87.898]  Yes    Acute bilateral low back pain with bilateral sciatica [M54.42, M54.41]  Yes    Discitis of lumbar region [M46.46]  Yes      Resolved Hospital Problems    Diagnosis Date Resolved POA    Skin irritation [R23.8] 12/10/2017 Yes       Allergies:Review of patient's allergies indicates:  No Known Allergies    Diet: regular diet    Activities: Activity as tolerated    Nursing: Skilled     Labs:  Weekly cbc, cmp, crp, esr and vancomycin trough and fax results to ID at [phone]    CONSULTS:     to evaluate for community resources/long-range planning.    MISCELLANEOUS CARE:  NA    WOUND CARE ORDERS  None    Medications: Review discharge medications with patient and family and provide education.      Current Discharge Medication List      START taking these medications    Details   baclofen (LIORESAL) 20 MG tablet Take 1 tablet (20 mg total) by mouth 3 (three) times daily.  Qty: 60 tablet, Refills: 0      cefTRIAXone 2 g in dextrose 5 % 50 mL (ROCEPHIN) 2 g/50 mL PgBk IVPB Inject 50 mLs (2 g total) into the vein once daily.      ibuprofen (ADVIL,MOTRIN) 600 MG tablet Take 1 tablet (600 mg total) by mouth every 6 (six) hours as needed.      nicotine (NICODERM CQ) 14 mg/24 hr Place 1 patch onto the skin once daily.  Refills: 0      ramelteon (ROZEREM) 8 mg tablet Take 1 tablet (8 mg total) by mouth nightly as needed for Insomnia.      senna-docusate 8.6-50 mg (PERICOLACE) 8.6-50 mg per tablet Take 1 tablet by mouth 2 (two) times daily.      VANCOMYCIN HCL (VANCOMYCIN IN 5  % DEXTROSE) 1.75 gram/500 mL Soln Inject 500 mLs (1,750 mg total) into the vein every 8 (eight) hours.         CONTINUE these medications which have NOT CHANGED    Details   gabapentin (NEURONTIN) 300 MG capsule Take 300 mg by mouth 3 (three) times daily.      hydrocodone-acetaminophen 7.5-325mg (NORCO) 7.5-325 mg per tablet Take 1 tablet by mouth every 6 (six) hours as needed for Pain.         STOP taking these medications       cyclobenzaprine (FLEXERIL) 5 MG tablet Comments:   Reason for Stopping:                    _________________________________  Mateo Carlson MD  12/19/2017

## 2017-12-19 NOTE — NURSING
Report called to incoming facility. Patient aware of transfer. Report given to BRYCE Loo. Awaiting transportation.

## 2017-12-19 NOTE — SUBJECTIVE & OBJECTIVE
Interval History: No acute events overnight.  Patient ambulating the halls without the assistance of the walker.      Review of Systems   Constitutional: Negative for chills, fever and unexpected weight change.   HENT: Negative for hearing loss.    Eyes: Negative for visual disturbance.   Respiratory: Negative for shortness of breath.    Cardiovascular: Negative for chest pain.   Gastrointestinal: Negative for abdominal pain, diarrhea, nausea and vomiting.   Genitourinary: Negative for dysuria.   Musculoskeletal: Positive for back pain. Negative for arthralgias.        Back spasms that are improving   Skin: Negative for rash.   Neurological: Negative for seizures and numbness.     Objective:     Vital Signs (Most Recent):  Temp: 98 °F (36.7 °C) (12/19/17 0840)  Pulse: 106 (12/19/17 0840)  Resp: 16 (12/19/17 0840)  BP: 121/69 (12/19/17 0840)  SpO2: 95 % (12/19/17 0840) Vital Signs (24h Range):  Temp:  [98 °F (36.7 °C)-98.7 °F (37.1 °C)] 98 °F (36.7 °C)  Pulse:  [101-112] 106  Resp:  [14-18] 16  SpO2:  [95 %-99 %] 95 %  BP: (121-143)/(56-72) 121/69     Weight: 90.2 kg (198 lb 13.7 oz)  Body mass index is 25.53 kg/m².    Intake/Output Summary (Last 24 hours) at 12/19/17 1023  Last data filed at 12/19/17 0600   Gross per 24 hour   Intake             5700 ml   Output                0 ml   Net             5700 ml      Physical Exam   Constitutional: He is oriented to person, place, and time. He appears well-developed and well-nourished.   HENT:   Head: Normocephalic and atraumatic.   Eyes: EOM are normal. Pupils are equal, round, and reactive to light.   Neck: No tracheal deviation present. No thyromegaly present.   Cardiovascular: Normal rate.  Exam reveals no gallop and no friction rub.    No murmur heard.  Pulmonary/Chest: Effort normal and breath sounds normal. He has no wheezes. He has no rales.   Abdominal: There is no tenderness. No hernia.   Musculoskeletal: He exhibits tenderness (lumbar spine ). He exhibits no  edema.   Neurological: He is alert and oriented to person, place, and time. No cranial nerve deficit or sensory deficit. He exhibits normal muscle tone.   Skin: Skin is warm. No erythema.   Multiple tattoos   Psychiatric: He has a normal mood and affect.       Significant Labs:   Recent Results (from the past 24 hour(s))   VANCOMYCIN, TROUGH before 4th dose    Collection Time: 12/19/17  7:57 AM   Result Value Ref Range    Vancomycin-Trough 18.1 10.0 - 22.0 ug/mL         Significant Imaging: No new imaging

## 2017-12-19 NOTE — PLAN OF CARE
Problem: Patient Care Overview  Goal: Plan of Care Review  Outcome: Ongoing (interventions implemented as appropriate)  Patient AAOx4. VSS. PRN pain medication administered. Patient ambulating around unit. Remains free of falls. No acute changes.   Iv abx administered.

## 2017-12-19 NOTE — PLAN OF CARE
Rajesh did receive call from Shanthi with [facility], Pt to d/c to room 570, nurse to call report to . Rajesh released stretcher transport for 1530pm and approved by Romel Anderson to bill [facility] for LTAC stretcher transport. Packet let with unit secretary to transport.

## 2017-12-19 NOTE — PLAN OF CARE
Problem: Patient Care Overview  Goal: Plan of Care Review  Outcome: Ongoing (interventions implemented as appropriate)  Pt AAOx4, afebrile, free of falls. Pt continues to ambulate with walker throughout halls. Pt c/o pain, managed with PRN's. IV ABx administered as ordered. Vanc trough rescheduled for morning dose, MD aware. No acute changes overnight. WCTM.

## 2017-12-19 NOTE — ASSESSMENT & PLAN NOTE
- Admission drug screen positive for opiates (prescribed to him as an outpatient), but also benzodiazepines, THC, and cocaine  - Patient to be transferred to LTAC facility

## 2017-12-19 NOTE — PLAN OF CARE
Rajesh has auth for Pt per insurance per Shanthi with [facility], need facility transfer orders. Rajesh to follow. Rajesh did pend stretcher transport with Kanwal with [facility] does need  name to approve stretcher and [facility], Rajesh to follow.  [employer] Dodd. Orders uploaded to LTAC, Rajesh did inform Rajesh Maki has approval per Romel Weiner to bill [facility] to [facility] for acute to acute transport. Rajesh to release once bed assigned.

## 2017-12-19 NOTE — PLAN OF CARE
12/19/17 1446   Final Note   Assessment Type Final Discharge Note   Discharge Disposition LTAC   What phone number can be called within the next 1-3 days to see how you are doing after discharge? [phone]   Right Care Referral Info   Post Acute Recommendation Other   Facility Name [facility]

## 2017-12-19 NOTE — ASSESSMENT & PLAN NOTE
- Stable  - Patient with discitis and acute on chronic osteomyelitis of lumbar region. Pain mostly unchanged from yesterday.  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, which complicates our discharge planning and pain control regimen  - Symptomatic treatment with current pain regiment- gabapentin 900mg TID, Ibprofen 600mg Q6, baclofen, lidocaine patch and PRN Norco 10mg-325mg for break-through pain

## 2017-12-27 DIAGNOSIS — M86.9 OSTEOMYELITIS, UNSPECIFIED SITE, UNSPECIFIED TYPE: ICD-10-CM

## 2017-12-27 DIAGNOSIS — M46.46 DISCITIS OF LUMBAR REGION: ICD-10-CM

## 2017-12-27 DIAGNOSIS — F19.11 HISTORY OF SUBSTANCE ABUSE: Primary | ICD-10-CM

## 2018-01-16 ENCOUNTER — HOSPITAL ENCOUNTER (OUTPATIENT)
Dept: RADIOLOGY | Facility: HOSPITAL | Age: 33
Discharge: HOME | End: 2018-01-16
Attending: PHYSICIAN ASSISTANT
Payer: MEDICAID

## 2018-01-16 DIAGNOSIS — M46.46 LUMBAR DISCITIS: ICD-10-CM

## 2018-01-16 PROCEDURE — 25500020 PHARM REV CODE 255: Performed by: PHYSICIAN ASSISTANT

## 2018-01-16 PROCEDURE — 72132 CT LUMBAR SPINE W/DYE: CPT | Mod: 26,,, | Performed by: RADIOLOGY

## 2018-01-16 PROCEDURE — 72132 CT LUMBAR SPINE W/DYE: CPT | Mod: TC

## 2018-01-16 RX ADMIN — IOHEXOL 100 ML: 350 INJECTION, SOLUTION INTRAVENOUS at 03:01

## 2018-01-19 LAB
ACID FAST MOD KINY STN SPEC: NORMAL
MYCOBACTERIUM SPEC QL CULT: NORMAL

## 2018-01-31 ENCOUNTER — OFFICE VISIT (OUTPATIENT)
Dept: NEUROSURGERY | Facility: CLINIC | Age: 33
End: 2018-01-31
Attending: PHYSICIAN ASSISTANT
Payer: MEDICAID

## 2018-01-31 VITALS
BODY MASS INDEX: 28.23 KG/M2 | SYSTOLIC BLOOD PRESSURE: 121 MMHG | HEIGHT: 74 IN | WEIGHT: 220 LBS | HEART RATE: 78 BPM | DIASTOLIC BLOOD PRESSURE: 78 MMHG | TEMPERATURE: 98 F

## 2018-01-31 DIAGNOSIS — M46.26 OSTEOMYELITIS OF LUMBAR SPINE: Primary | ICD-10-CM

## 2018-01-31 PROCEDURE — 99213 OFFICE O/P EST LOW 20 MIN: CPT | Mod: PBBFAC | Performed by: PHYSICIAN ASSISTANT

## 2018-01-31 PROCEDURE — 99213 OFFICE O/P EST LOW 20 MIN: CPT | Mod: S$PBB,,, | Performed by: PHYSICIAN ASSISTANT

## 2018-01-31 PROCEDURE — 3008F BODY MASS INDEX DOCD: CPT | Mod: ,,, | Performed by: PHYSICIAN ASSISTANT

## 2018-01-31 PROCEDURE — 99999 PR PBB SHADOW E&M-EST. PATIENT-LVL III: CPT | Mod: PBBFAC,,, | Performed by: PHYSICIAN ASSISTANT

## 2018-01-31 RX ORDER — HYDROCODONE BITARTRATE AND ACETAMINOPHEN 7.5; 325 MG/1; MG/1
1 TABLET ORAL EVERY 6 HOURS PRN
Qty: 40 TABLET | Refills: 0 | Status: SHIPPED | OUTPATIENT
Start: 2018-01-31 | End: 2018-02-14
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
## 2018-02-14 RX ORDER — HYDROCODONE BITARTRATE AND ACETAMINOPHEN 10; 325 MG/1; MG/1
TABLET ORAL
COMMUNITY
Start: 2018-01-02 | End: 2018-05-22

## 2018-02-15 ENCOUNTER — TELEPHONE (OUTPATIENT)
Dept: NEUROSURGERY | Facility: CLINIC | Age: 33
End: 2018-02-15

## 2018-02-15 RX ORDER — BACLOFEN 10 MG/1
10 TABLET ORAL 3 TIMES DAILY
Qty: 90 TABLET | Refills: 11 | Status: SHIPPED | OUTPATIENT
Start: 2018-02-15 | End: 2019-02-15

## 2018-02-15 RX ORDER — TRAMADOL HYDROCHLORIDE 50 MG/1
50 TABLET ORAL EVERY 6 HOURS PRN
Qty: 60 TABLET | Refills: 0 | Status: SHIPPED | OUTPATIENT
Start: 2018-02-15 | End: 2018-02-25

## 2018-02-15 RX ORDER — GABAPENTIN 300 MG/1
300 CAPSULE ORAL 3 TIMES DAILY
Qty: 90 CAPSULE | Refills: 0 | Status: SHIPPED | OUTPATIENT
Start: 2018-02-15

## 2018-02-15 NOTE — TELEPHONE ENCOUNTER
[facility]VD REQUEST TO HAVE 3 MEDICATIONS REFILLED, ROSA BRODY PA THIS AM. SHE HAS AGREED TO REFILL ALL 3 MEDICATIONS. GABAPENTIN & BACLOFEN HAVE E-SCRIBED TO [facility], TRAMADOL 50mg TABS HAS PRINTED AND IS SIGNED FOR PT TO PU ANYTIME TODAY BEFORE 5:00PM. LVM WITH PT THIS AM WITH THE ABOVE INFO.

## 2018-02-20 ENCOUNTER — TELEPHONE (OUTPATIENT)
Dept: NEUROSURGERY | Facility: CLINIC | Age: 33
End: 2018-02-20

## 2018-02-20 NOTE — TELEPHONE ENCOUNTER
Spoke with patient, I advised patient that Ivet pickard is currently in surgery. I advised patient that he can go to the nearest urgent care or ER if symptoms cause for a concern . I advsied patient that I have sent a message to Ivet pickard to give him a call       ----- Message from Juliana Gibbs sent at 2/20/2018 10:34 AM CST -----  Contact: self  Patient states need to speak with nurse   Pt states experiencing side affect of medication Tramadol  Pt states nausea stomach pain.   Patient states  medication not helping back pain   Please call pt at [phone]

## 2018-02-21 ENCOUNTER — TELEPHONE (OUTPATIENT)
Dept: NEUROSURGERY | Facility: CLINIC | Age: 33
End: 2018-02-21

## 2018-02-21 NOTE — TELEPHONE ENCOUNTER
SW PT. HE SAYS THE TRAMADOL IS NOT WORKING ANS IS HAVING PROBLEMS SLEEPING. WAKES UP EVERY 2 HRS. MESSAGE FORWARDED TO BAYLEE FORTE FOR CONSIDERATION & ADVICE.

## 2018-02-21 NOTE — TELEPHONE ENCOUNTER
----- Message from Delicia Garves sent at 2/21/2018  1:12 PM CST -----  Contact: self @ [phone]  Pt says his prescription for tramadol 50 mg is not helping his back pain, it is causing nausea and he is having trouble sleeping.  He says he left a message on yesterday and has not received a return call yet.  Pls call to discuss asap.

## 2018-02-22 ENCOUNTER — TELEPHONE (OUTPATIENT)
Dept: NEUROSURGERY | Facility: CLINIC | Age: 33
End: 2018-02-22

## 2018-02-22 DIAGNOSIS — M46.46 LUMBAR DISCITIS: Primary | ICD-10-CM

## 2018-02-22 RX ORDER — ACETAMINOPHEN AND CODEINE PHOSPHATE 300; 30 MG/1; MG/1
1 TABLET ORAL EVERY 6 HOURS PRN
Qty: 60 TABLET | Refills: 0 | Status: SHIPPED | OUTPATIENT
Start: 2018-02-22 | End: 2018-05-22

## 2018-02-22 NOTE — TELEPHONE ENCOUNTER
----- Message from Ivet Dobbs PA-C sent at 2/22/2018 11:03 AM CST -----  Contact: self @ [phone]  FILIPE,    Sorabraham, was out of the office yesterday.  The only thing I will offer at this point is Tylenol #3.  Please let me know if I need to write an RX for this.  Thanks, Ivet     ----- Message -----  From: Ye Evans MA  Sent: 2/21/2018   3:14 PM  To: ZEKE Billings, YOUR THOUGHTS ARE APPRECIATED. FILIPE    ----- Message -----  From: Delicia Graves  Sent: 2/21/2018   1:12 PM  To: Rinku RYAN Staff    Pt says his prescription for tramadol 50 mg is not helping his back pain, it is causing nausea and he is having trouble sleeping.  He says he left a message on yesterday and has not received a return call yet.  Pls call to discuss asap.

## 2018-02-22 NOTE — TELEPHONE ENCOUNTER
ROSA PT, Rx FOR TYLENOL #3 IS HERE IN THE CLINIC AND READY TO BE PICKED UP. HERE TILL 5:00pm. PT IS AWARE.

## 2018-03-08 ENCOUNTER — OFFICE VISIT (OUTPATIENT)
Dept: NEUROSURGERY | Facility: CLINIC | Age: 33
End: 2018-03-08
Payer: MEDICAID

## 2018-03-08 ENCOUNTER — LAB VISIT (OUTPATIENT)
Dept: LAB | Facility: HOSPITAL | Age: 33
End: 2018-03-08
Attending: NEUROLOGICAL SURGERY
Payer: MEDICAID

## 2018-03-08 VITALS
HEIGHT: 74 IN | TEMPERATURE: 98 F | WEIGHT: 219.5 LBS | HEART RATE: 63 BPM | DIASTOLIC BLOOD PRESSURE: 80 MMHG | BODY MASS INDEX: 28.17 KG/M2 | SYSTOLIC BLOOD PRESSURE: 130 MMHG

## 2018-03-08 DIAGNOSIS — M46.46 DISCITIS OF LUMBAR REGION: Primary | ICD-10-CM

## 2018-03-08 DIAGNOSIS — M46.46 DISCITIS OF LUMBAR REGION: ICD-10-CM

## 2018-03-08 LAB
CRP SERPL-MCNC: 0.5 MG/L
ERYTHROCYTE [SEDIMENTATION RATE] IN BLOOD BY WESTERGREN METHOD: 5 MM/HR
PROCALCITONIN SERPL IA-MCNC: 0.03 NG/ML

## 2018-03-08 PROCEDURE — 99213 OFFICE O/P EST LOW 20 MIN: CPT | Mod: PBBFAC | Performed by: NEUROLOGICAL SURGERY

## 2018-03-08 PROCEDURE — 99214 OFFICE O/P EST MOD 30 MIN: CPT | Mod: S$PBB,,, | Performed by: NEUROLOGICAL SURGERY

## 2018-03-08 PROCEDURE — 36415 COLL VENOUS BLD VENIPUNCTURE: CPT

## 2018-03-08 PROCEDURE — 84145 PROCALCITONIN (PCT): CPT

## 2018-03-08 PROCEDURE — 85651 RBC SED RATE NONAUTOMATED: CPT

## 2018-03-08 PROCEDURE — 99999 PR PBB SHADOW E&M-EST. PATIENT-LVL III: CPT | Mod: PBBFAC,,, | Performed by: NEUROLOGICAL SURGERY

## 2018-03-08 PROCEDURE — 86140 C-REACTIVE PROTEIN: CPT
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
## 2018-03-09 ENCOUNTER — TELEPHONE (OUTPATIENT)
Dept: NEUROSURGERY | Facility: CLINIC | Age: 33
End: 2018-03-09

## 2018-03-09 NOTE — TELEPHONE ENCOUNTER
LVM WITH PT, REFERRING TO ABOVE MESSAGE. PT SAW DR WASHINGTON YESTERDAY AND HAS BEEN REFERRED TO PAIN MANAGEMENT. SW DR WASHINGTON'S MA SHE WILL MAIL EXTERNAL REFERRAL TO PAIN MANAGEMENT TO PATIENT TO HOME ADDRESS. DR WASHINGTON HAS NOT MENTIONED MEDICATIONS IN HIS RECENT OFFICE VISIT.

## 2018-03-09 NOTE — TELEPHONE ENCOUNTER
----- Message from Abdulaziz Jeffers sent at 3/9/2018 11:50 AM CST -----  Contact: Pt. [phone]  The patient would like to speak to you regarding his medication.

## 2018-03-12 ENCOUNTER — TELEPHONE (OUTPATIENT)
Dept: NEUROSURGERY | Facility: CLINIC | Age: 33
End: 2018-03-12

## 2018-03-12 NOTE — TELEPHONE ENCOUNTER
Called patient to advise him that his insurance does not cover facet injections and discuss other options . the patient did not answer I left a message on [employer] advising him to give me a call back     ----- Message from Grazyna Bruce sent at 3/12/2018  8:19 AM CDT -----  Order received for b/l facet injections; however Pain Management is currently not accepting any new patients with Medicaid.     Please contact patient.

## 2018-03-16 ENCOUNTER — TELEPHONE (OUTPATIENT)
Dept: NEUROSURGERY | Facility: CLINIC | Age: 33
End: 2018-03-16

## 2018-03-16 NOTE — TELEPHONE ENCOUNTER
Returned pt call but phone is disconnected.     ----- Message from Ye Evans MA sent at 3/15/2018  2:57 PM CDT -----  Contact: self @ [phone]  Balbir baker, this patient has recently seen Dr. Bauer in clinic, I am forwarding this request to his staff to handle. Thanks, EJ    ----- Message -----  From: Delicia Graves  Sent: 3/15/2018   2:11 PM  To: Rinku RYAN Staff    Pt says the tylenol prescription is not working.  Pt would like another prescription for tramadol.  Pls call.

## 2018-05-10 ENCOUNTER — OFFICE VISIT (OUTPATIENT)
Dept: NEUROSURGERY | Facility: CLINIC | Age: 33
End: 2018-05-10
Payer: MEDICAID

## 2018-05-10 VITALS
WEIGHT: 216.69 LBS | BODY MASS INDEX: 27.81 KG/M2 | HEIGHT: 74 IN | HEART RATE: 79 BPM | SYSTOLIC BLOOD PRESSURE: 108 MMHG | TEMPERATURE: 98 F | DIASTOLIC BLOOD PRESSURE: 66 MMHG

## 2018-05-10 DIAGNOSIS — M46.46 DISCITIS OF LUMBAR REGION: Primary | ICD-10-CM

## 2018-05-10 PROCEDURE — 99213 OFFICE O/P EST LOW 20 MIN: CPT | Mod: S$PBB,,, | Performed by: NEUROLOGICAL SURGERY

## 2018-05-10 PROCEDURE — 99213 OFFICE O/P EST LOW 20 MIN: CPT | Mod: PBBFAC | Performed by: NEUROLOGICAL SURGERY

## 2018-05-10 PROCEDURE — 99999 PR PBB SHADOW E&M-EST. PATIENT-LVL III: CPT | Mod: PBBFAC,,, | Performed by: NEUROLOGICAL SURGERY
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
## 2018-05-22 ENCOUNTER — OFFICE VISIT (OUTPATIENT)
Dept: INTERNAL MEDICINE | Facility: CLINIC | Age: 33
End: 2018-05-22
Payer: MEDICAID

## 2018-05-22 VITALS
DIASTOLIC BLOOD PRESSURE: 79 MMHG | OXYGEN SATURATION: 97 % | SYSTOLIC BLOOD PRESSURE: 122 MMHG | HEIGHT: 74 IN | BODY MASS INDEX: 27.78 KG/M2 | HEART RATE: 91 BPM | WEIGHT: 216.5 LBS

## 2018-05-22 DIAGNOSIS — M54.5 BILATERAL LOW BACK PAIN, UNSPECIFIED CHRONICITY, WITH SCIATICA PRESENCE UNSPECIFIED: Primary | ICD-10-CM

## 2018-05-22 PROCEDURE — 99213 OFFICE O/P EST LOW 20 MIN: CPT | Mod: PBBFAC | Performed by: STUDENT IN AN ORGANIZED HEALTH CARE EDUCATION/TRAINING PROGRAM

## 2018-05-22 PROCEDURE — 99999 PR PBB SHADOW E&M-EST. PATIENT-LVL III: CPT | Mod: PBBFAC,,, | Performed by: STUDENT IN AN ORGANIZED HEALTH CARE EDUCATION/TRAINING PROGRAM

## 2018-05-22 PROCEDURE — 99213 OFFICE O/P EST LOW 20 MIN: CPT | Mod: S$PBB,,, | Performed by: STUDENT IN AN ORGANIZED HEALTH CARE EDUCATION/TRAINING PROGRAM
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
## 2018-12-04 NOTE — ASSESSMENT & PLAN NOTE
- Patient with discitis and acute on chronic osteomyelitis of lumbar region- currently increasing pain despite attempts to help with pain.  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 and this makes him very hard to attain relief in    - Symptomatic treatment with new pain regiment- gabapentin 900mg TID, Ibprofen 600mg Q6, flexeril 10mg TID PRN, and PRN Norco 10mg-325mg for break-through pain.   - currently patient is feeling good and without much pain         Declines

## 2019-02-18 ENCOUNTER — HOSPITAL ENCOUNTER (EMERGENCY)
Facility: HOSPITAL | Age: 34
Discharge: HOME | End: 2019-02-19
Attending: EMERGENCY MEDICINE
Payer: MEDICAID

## 2019-02-18 DIAGNOSIS — R11.10 VOMITING, INTRACTABILITY OF VOMITING NOT SPECIFIED, PRESENCE OF NAUSEA NOT SPECIFIED, UNSPECIFIED VOMITING TYPE: Primary | ICD-10-CM

## 2019-02-18 PROCEDURE — 99284 EMERGENCY DEPT VISIT MOD MDM: CPT | Mod: ,,, | Performed by: EMERGENCY MEDICINE

## 2019-02-18 PROCEDURE — 99284 PR EMERGENCY DEPT VISIT,LEVEL IV: ICD-10-PCS | Mod: ,,, | Performed by: EMERGENCY MEDICINE

## 2019-02-18 PROCEDURE — 99285 EMERGENCY DEPT VISIT HI MDM: CPT | Mod: 25

## 2019-02-18 RX ORDER — ONDANSETRON 2 MG/ML
4 INJECTION INTRAMUSCULAR; INTRAVENOUS
Status: COMPLETED | OUTPATIENT
Start: 2019-02-19 | End: 2019-02-19

## 2019-02-18 RX ORDER — QUETIAPINE FUMARATE 50 MG/1
50 TABLET, FILM COATED ORAL NIGHTLY
COMMUNITY

## 2019-02-19 VITALS
HEART RATE: 51 BPM | HEIGHT: 74 IN | TEMPERATURE: 99 F | SYSTOLIC BLOOD PRESSURE: 139 MMHG | RESPIRATION RATE: 18 BRPM | WEIGHT: 195 LBS | BODY MASS INDEX: 25.03 KG/M2 | DIASTOLIC BLOOD PRESSURE: 83 MMHG | OXYGEN SATURATION: 100 %

## 2019-02-19 LAB
ALBUMIN SERPL BCP-MCNC: 3.8 G/DL
ALP SERPL-CCNC: 82 U/L
ALT SERPL W/O P-5'-P-CCNC: 89 U/L
ANION GAP SERPL CALC-SCNC: 11 MMOL/L
AST SERPL-CCNC: 29 U/L
BASOPHILS # BLD AUTO: 0.01 K/UL
BASOPHILS NFR BLD: 0.2 %
BILIRUB SERPL-MCNC: 0.7 MG/DL
BILIRUB UR QL STRIP: NEGATIVE
BUN SERPL-MCNC: 11 MG/DL
CALCIUM SERPL-MCNC: 9.6 MG/DL
CHLORIDE SERPL-SCNC: 101 MMOL/L
CLARITY UR REFRACT.AUTO: CLEAR
CO2 SERPL-SCNC: 27 MMOL/L
COLOR UR AUTO: ABNORMAL
CREAT SERPL-MCNC: 1 MG/DL
DIFFERENTIAL METHOD: ABNORMAL
EOSINOPHIL # BLD AUTO: 0 K/UL
EOSINOPHIL NFR BLD: 0.2 %
ERYTHROCYTE [DISTWIDTH] IN BLOOD BY AUTOMATED COUNT: 15 %
EST. GFR  (AFRICAN AMERICAN): >60 ML/MIN/1.73 M^2
EST. GFR  (NON AFRICAN AMERICAN): >60 ML/MIN/1.73 M^2
GLUCOSE SERPL-MCNC: 101 MG/DL
GLUCOSE UR QL STRIP: NEGATIVE
HCT VFR BLD AUTO: 42.9 %
HGB BLD-MCNC: 13.6 G/DL
HGB UR QL STRIP: NEGATIVE
IMM GRANULOCYTES # BLD AUTO: 0.01 K/UL
IMM GRANULOCYTES NFR BLD AUTO: 0.2 %
KETONES UR QL STRIP: ABNORMAL
LEUKOCYTE ESTERASE UR QL STRIP: NEGATIVE
LIPASE SERPL-CCNC: 13 U/L
LYMPHOCYTES # BLD AUTO: 1.7 K/UL
LYMPHOCYTES NFR BLD: 40 %
MCH RBC QN AUTO: 26.4 PG
MCHC RBC AUTO-ENTMCNC: 31.7 G/DL
MCV RBC AUTO: 83 FL
MONOCYTES # BLD AUTO: 0.6 K/UL
MONOCYTES NFR BLD: 14.4 %
NEUTROPHILS # BLD AUTO: 1.9 K/UL
NEUTROPHILS NFR BLD: 45 %
NITRITE UR QL STRIP: NEGATIVE
NRBC BLD-RTO: 0 /100 WBC
PH UR STRIP: 6 [PH] (ref 5–8)
PLATELET # BLD AUTO: 259 K/UL
PMV BLD AUTO: 10.3 FL
POTASSIUM SERPL-SCNC: 3.6 MMOL/L
PROT SERPL-MCNC: 7.6 G/DL
PROT UR QL STRIP: NEGATIVE
RBC # BLD AUTO: 5.16 M/UL
SODIUM SERPL-SCNC: 139 MMOL/L
SP GR UR STRIP: >=1.03 (ref 1–1.03)
URN SPEC COLLECT METH UR: ABNORMAL
WBC # BLD AUTO: 4.18 K/UL

## 2019-02-19 PROCEDURE — 85025 COMPLETE CBC W/AUTO DIFF WBC: CPT

## 2019-02-19 PROCEDURE — 80053 COMPREHEN METABOLIC PANEL: CPT

## 2019-02-19 PROCEDURE — 25000003 PHARM REV CODE 250: Performed by: PHYSICIAN ASSISTANT

## 2019-02-19 PROCEDURE — 81003 URINALYSIS AUTO W/O SCOPE: CPT

## 2019-02-19 PROCEDURE — 63600175 PHARM REV CODE 636 W HCPCS: Performed by: EMERGENCY MEDICINE

## 2019-02-19 PROCEDURE — 83690 ASSAY OF LIPASE: CPT

## 2019-02-19 PROCEDURE — 25500020 PHARM REV CODE 255: Performed by: EMERGENCY MEDICINE

## 2019-02-19 PROCEDURE — 96374 THER/PROPH/DIAG INJ IV PUSH: CPT | Mod: 59

## 2019-02-19 PROCEDURE — 25000003 PHARM REV CODE 250: Performed by: EMERGENCY MEDICINE

## 2019-02-19 PROCEDURE — 96376 TX/PRO/DX INJ SAME DRUG ADON: CPT

## 2019-02-19 PROCEDURE — 96361 HYDRATE IV INFUSION ADD-ON: CPT

## 2019-02-19 PROCEDURE — 63600175 PHARM REV CODE 636 W HCPCS: Performed by: PHYSICIAN ASSISTANT

## 2019-02-19 RX ORDER — ONDANSETRON 4 MG/1
4 TABLET, FILM COATED ORAL EVERY 6 HOURS PRN
Qty: 12 TABLET | Refills: 0 | Status: SHIPPED | OUTPATIENT
Start: 2019-02-19

## 2019-02-19 RX ORDER — DICYCLOMINE HYDROCHLORIDE 10 MG/1
20 CAPSULE ORAL
Status: COMPLETED | OUTPATIENT
Start: 2019-02-19 | End: 2019-02-19

## 2019-02-19 RX ORDER — DICYCLOMINE HYDROCHLORIDE 20 MG/1
20 TABLET ORAL 2 TIMES DAILY
Qty: 20 TABLET | Refills: 0 | Status: SHIPPED | OUTPATIENT
Start: 2019-02-19 | End: 2019-03-21

## 2019-02-19 RX ORDER — ONDANSETRON 2 MG/ML
4 INJECTION INTRAMUSCULAR; INTRAVENOUS
Status: COMPLETED | OUTPATIENT
Start: 2019-02-19 | End: 2019-02-19

## 2019-02-19 RX ADMIN — IOHEXOL 100 ML: 350 INJECTION, SOLUTION INTRAVENOUS at 05:02

## 2019-02-19 RX ADMIN — DICYCLOMINE HYDROCHLORIDE 20 MG: 10 CAPSULE ORAL at 04:02

## 2019-02-19 RX ADMIN — SODIUM CHLORIDE 1000 ML: 0.9 INJECTION, SOLUTION INTRAVENOUS at 05:02

## 2019-02-19 RX ADMIN — ONDANSETRON 4 MG: 2 INJECTION INTRAMUSCULAR; INTRAVENOUS at 12:02

## 2019-02-19 RX ADMIN — SODIUM CHLORIDE 1000 ML: 0.9 INJECTION, SOLUTION INTRAVENOUS at 12:02

## 2019-02-19 RX ADMIN — ONDANSETRON 4 MG: 2 INJECTION INTRAMUSCULAR; INTRAVENOUS at 05:02

## 2019-02-19 NOTE — ED NOTES
Notified Byron diaz of patient's complaints of abdomen pain. No new orders given. Will continue to monitor

## 2019-02-19 NOTE — PROVIDER PROGRESS NOTES - EMERGENCY DEPT.
I assumed care of this patient from the outgoing ED attending.   Patient's labs hemolyzing had to be redrawn, no acute findings on labs.  On reassessment patient he was  in the abdomen.  One attempted a p.o. challenge but he became nauseous and threw up.   Decision made to get a CT scan.   A time of sign-out at 6:00 a.m. you CT scan results pending.   If no acute findings, patient is discharged with prescriptions for Zofran and Bentyl, I suspect gastroenteritis.   Disposition though pending results of CT
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
## 2019-07-10 ENCOUNTER — HOSPITAL ENCOUNTER (EMERGENCY)
Facility: OTHER | Age: 34
Discharge: HOME | End: 2019-07-10
Attending: EMERGENCY MEDICINE
Payer: MEDICAID

## 2019-07-10 VITALS
DIASTOLIC BLOOD PRESSURE: 72 MMHG | TEMPERATURE: 99 F | HEIGHT: 74 IN | HEART RATE: 75 BPM | BODY MASS INDEX: 24.38 KG/M2 | OXYGEN SATURATION: 98 % | RESPIRATION RATE: 18 BRPM | SYSTOLIC BLOOD PRESSURE: 114 MMHG | WEIGHT: 190 LBS

## 2019-07-10 DIAGNOSIS — R51.9 RIGHT-SIDED HEADACHE: Primary | ICD-10-CM

## 2019-07-10 DIAGNOSIS — F19.10 POLYSUBSTANCE ABUSE: ICD-10-CM

## 2019-07-10 PROCEDURE — 99283 EMERGENCY DEPT VISIT LOW MDM: CPT

## 2019-07-10 PROCEDURE — 25000003 PHARM REV CODE 250: Performed by: EMERGENCY MEDICINE

## 2019-07-10 RX ORDER — ACETAMINOPHEN 500 MG
1000 TABLET ORAL
Status: COMPLETED | OUTPATIENT
Start: 2019-07-10 | End: 2019-07-10

## 2019-07-10 RX ADMIN — ACETAMINOPHEN 1000 MG: 500 TABLET ORAL at 02:07
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
## 2021-09-17 NOTE — ED TRIAGE NOTES
CLINICAL PHARMACY NOTE: MEDS TO BEDS    Total # of Prescriptions Filled: 6   The following medications were delivered to the patient:  · Cephalexin 500mg cap   · Simethicone 80mg chew   · Polyethylene 17gm powder  · Docusate Sodium 100mg cap  · Ondasetron 4mg tab  · Acetaminophen 500mg tab    Additional Documentation: Pt presents to ED c/o abdominal pain and vomiting that started on Saturday. Reports that he has not been able to tolerate PO intake. Denies diarrhea or blood in emesis.     LOC: Patient name and date of birth verified.  The patient is awake, alert and aware of environment with an appropriate affect, the patient is oriented x 3 and speaking appropriately.  Pt in NAD.    APPEARANCE: Patient resting comfortably and in no acute distress, patient is clean and well groomed, patient's clothing is properly fastened.  SKIN: The skin is diaphoretic, color consistent with ethnicity, patient has normal skin turgor and moist mucus membranes, skin intact, no breakdown or brusing noted.  MUSCULOSKELETAL: Patient moving all extremities well, no obvious swelling or deformities noted.  RESPIRATORY: Airway is open and patent, respirations are spontaneous, patient has a normal effort and rate, no accessory muscle use noted.  CARDIAC: Patient has a normal rate and rhythm, no periphreal edema noted, capillary refill < 3 seconds.  ABDOMEN: Soft and non tender to palpation, no distention noted. Bowel sounds present in all four quadrants. Pt reports generalized abdominal pain and vomiting since Saturday   NEUROLOGIC: Eyes open spontaneously, behavior appropriate to situation, follows commands, facial expression symmetrical, bilateral hand grasp equal and even, purposeful motor response noted, normal sensation in all extremities when touched with a finger.

## 2023-09-20 NOTE — PLAN OF CARE
ROSA spoke with Conor at [facility] and was told that the referral is still in the admissions department.  Conor took ROSA name and number and stated that's he would give it to the director.  ROSA will F/U.      Kaleigh Colby LMSW   3